# Patient Record
Sex: MALE | Race: WHITE | Employment: UNEMPLOYED | URBAN - METROPOLITAN AREA
[De-identification: names, ages, dates, MRNs, and addresses within clinical notes are randomized per-mention and may not be internally consistent; named-entity substitution may affect disease eponyms.]

---

## 2017-01-19 ENCOUNTER — HOSPITAL ENCOUNTER (OUTPATIENT)
Dept: LAB | Age: 62
Discharge: HOME | End: 2017-01-19
Attending: PSYCHIATRY & NEUROLOGY | Admitting: PSYCHIATRY & NEUROLOGY

## 2017-01-19 LAB
ALBUMIN SERPL-MCNC: 4.2 GM/DL (ref 3.4–5)
ALP BLD-CCNC: 97 IU/L (ref 40–128)
ALT SERPL-CCNC: 7 U/L (ref 10–40)
ANION GAP SERPL CALCULATED.3IONS-SCNC: 13 MMOL/L (ref 4–16)
AST SERPL-CCNC: 12 IU/L (ref 15–37)
BASOPHILS ABSOLUTE: 0 K/CU MM
BASOPHILS RELATIVE PERCENT: 0.6 % (ref 0–1)
BILIRUB SERPL-MCNC: 0.5 MG/DL (ref 0–1)
BUN BLDV-MCNC: 8 MG/DL (ref 6–23)
CALCIUM SERPL-MCNC: 9.4 MG/DL (ref 8.3–10.6)
CHLORIDE BLD-SCNC: 99 MMOL/L (ref 99–110)
CO2: 28 MMOL/L (ref 21–32)
CREAT SERPL-MCNC: 0.9 MG/DL (ref 0.9–1.3)
DIFFERENTIAL TYPE: ABNORMAL
EOSINOPHILS ABSOLUTE: 0.3 K/CU MM
EOSINOPHILS RELATIVE PERCENT: 3.6 % (ref 0–3)
ERYTHROCYTE SEDIMENTATION RATE: 11 MM/HR (ref 0–20)
GFR AFRICAN AMERICAN: >60 ML/MIN/1.73M2
GFR NON-AFRICAN AMERICAN: >60 ML/MIN/1.73M2
GLUCOSE FASTING: 100 MG/DL (ref 70–99)
HCT VFR BLD CALC: 46.1 % (ref 42–52)
HEMOGLOBIN: 15.1 GM/DL (ref 13.5–18)
IMMATURE NEUTROPHIL %: 0.4 % (ref 0–0.43)
LYMPHOCYTES ABSOLUTE: 1.4 K/CU MM
LYMPHOCYTES RELATIVE PERCENT: 21 % (ref 24–44)
MCH RBC QN AUTO: 31.5 PG (ref 27–31)
MCHC RBC AUTO-ENTMCNC: 32.8 % (ref 32–36)
MCV RBC AUTO: 96 FL (ref 78–100)
MONOCYTES ABSOLUTE: 0.4 K/CU MM
MONOCYTES RELATIVE PERCENT: 6.4 % (ref 0–4)
NUCLEATED RBC %: 0 %
PDW BLD-RTO: 12.6 % (ref 11.7–14.9)
PLATELET # BLD: 161 K/CU MM (ref 140–440)
PMV BLD AUTO: 10 FL (ref 7.5–11.1)
POTASSIUM SERPL-SCNC: 3.9 MMOL/L (ref 3.5–5.1)
RBC # BLD: 4.8 M/CU MM (ref 4.6–6.2)
SEGMENTED NEUTROPHILS ABSOLUTE COUNT: 4.7 K/CU MM
SEGMENTED NEUTROPHILS RELATIVE PERCENT: 68 % (ref 36–66)
SODIUM BLD-SCNC: 140 MMOL/L (ref 135–145)
TOTAL IMMATURE NEUTOROPHIL: 0.03 K/CU MM
TOTAL NUCLEATED RBC: 0 K/CU MM
TOTAL PROTEIN: 6.9 GM/DL (ref 6.4–8.2)
VITAMIN B-12: 924.5 PG/ML (ref 211–911)
WBC # BLD: 6.9 K/CU MM (ref 4–10.5)

## 2017-01-20 LAB
ALBUMIN ELP: 2.8 GM/DL (ref 3.2–5.6)
ALPHA-1-GLOBULIN: 0.2 GM/DL (ref 0.1–0.4)
ALPHA-2-GLOBULIN: 0.8 GM/DL (ref 0.4–1.2)
ANTI-NUCLEAR ANTIBODY (ANA): NORMAL
BETA GLOBULIN: 1.3 GM/DL (ref 0.5–1.3)
GAMMA GLOBULIN: 1.9 GM/DL (ref 0.5–1.6)
PATHOLOGIST: ABNORMAL
TOTAL PROTEIN: 6.9 GM/DL (ref 6.4–8.2)

## 2017-03-22 ENCOUNTER — HOSPITAL ENCOUNTER (OUTPATIENT)
Dept: LAB | Age: 62
Discharge: HOME | End: 2017-03-22
Attending: INTERNAL MEDICINE | Admitting: INTERNAL MEDICINE

## 2017-03-22 ENCOUNTER — HOSPITAL ENCOUNTER (OUTPATIENT)
Dept: LAB | Age: 62
Discharge: HOME | End: 2017-03-22
Attending: PSYCHIATRY & NEUROLOGY | Admitting: PSYCHIATRY & NEUROLOGY

## 2017-03-22 LAB
ALBUMIN SERPL-MCNC: 4.1 GM/DL (ref 3.4–5)
ALP BLD-CCNC: 109 IU/L (ref 40–129)
ALT SERPL-CCNC: 9 U/L (ref 10–40)
ANION GAP SERPL CALCULATED.3IONS-SCNC: 15 MMOL/L (ref 4–16)
AST SERPL-CCNC: 12 IU/L (ref 15–37)
BASOPHILS ABSOLUTE: 0.1 K/CU MM
BASOPHILS RELATIVE PERCENT: 0.6 % (ref 0–1)
BILIRUB SERPL-MCNC: 0.7 MG/DL (ref 0–1)
BUN BLDV-MCNC: 9 MG/DL (ref 6–23)
CALCIUM SERPL-MCNC: 9.1 MG/DL (ref 8.3–10.6)
CHLORIDE BLD-SCNC: 93 MMOL/L (ref 99–110)
CHOLESTEROL: 135 MG/DL
CO2: 29 MMOL/L (ref 21–32)
CREAT SERPL-MCNC: 1 MG/DL (ref 0.9–1.3)
DIFFERENTIAL TYPE: ABNORMAL
EOSINOPHILS ABSOLUTE: 0.3 K/CU MM
EOSINOPHILS RELATIVE PERCENT: 3.3 % (ref 0–3)
GFR AFRICAN AMERICAN: >60 ML/MIN/1.73M2
GFR NON-AFRICAN AMERICAN: >60 ML/MIN/1.73M2
GLUCOSE BLD-MCNC: 106 MG/DL (ref 70–140)
HCT VFR BLD CALC: 44 % (ref 42–52)
HDLC SERPL-MCNC: 62 MG/DL
HEMOGLOBIN: 14.6 GM/DL (ref 13.5–18)
IMMATURE NEUTROPHIL %: 0.4 % (ref 0–0.43)
LACTATE DEHYDROGENASE: 141 IU/L (ref 120–246)
LDL CHOLESTEROL CALCULATED: 43 MG/DL
LITHIUM LEVEL: 1.2 MMOL/L (ref 0.6–1.2)
LYMPHOCYTES ABSOLUTE: 1.2 K/CU MM
LYMPHOCYTES RELATIVE PERCENT: 14.1 % (ref 24–44)
MCH RBC QN AUTO: 31.3 PG (ref 27–31)
MCHC RBC AUTO-ENTMCNC: 33.2 % (ref 32–36)
MCV RBC AUTO: 94.2 FL (ref 78–100)
MONOCYTES ABSOLUTE: 0.7 K/CU MM
MONOCYTES RELATIVE PERCENT: 8.6 % (ref 0–4)
NUCLEATED RBC %: 0 %
PDW BLD-RTO: 12.4 % (ref 11.7–14.9)
PLATELET # BLD: 158 K/CU MM (ref 140–440)
PMV BLD AUTO: 9.3 FL (ref 7.5–11.1)
POTASSIUM SERPL-SCNC: 3.7 MMOL/L (ref 3.5–5.1)
RBC # BLD: 4.67 M/CU MM (ref 4.6–6.2)
SEGMENTED NEUTROPHILS ABSOLUTE COUNT: 6.1 K/CU MM
SEGMENTED NEUTROPHILS RELATIVE PERCENT: 73 % (ref 36–66)
SODIUM BLD-SCNC: 137 MMOL/L (ref 135–145)
TOTAL IMMATURE NEUTOROPHIL: 0.03 K/CU MM
TOTAL NUCLEATED RBC: 0 K/CU MM
TOTAL PROTEIN: 6.7 GM/DL (ref 6.4–8.2)
TRIGL SERPL-MCNC: 149 MG/DL
WBC # BLD: 8.3 K/CU MM (ref 4–10.5)

## 2017-06-20 ENCOUNTER — HOSPITAL ENCOUNTER (OUTPATIENT)
Dept: LAB | Age: 62
Discharge: HOME | End: 2017-06-20
Attending: PSYCHIATRY & NEUROLOGY | Admitting: PSYCHIATRY & NEUROLOGY

## 2017-06-20 LAB
ALBUMIN SERPL-MCNC: 4.1 GM/DL (ref 3.4–5)
ALP BLD-CCNC: 93 IU/L (ref 40–129)
ALT SERPL-CCNC: 7 U/L (ref 10–40)
AMMONIA: 45 UMOL/L (ref 16–60)
AST SERPL-CCNC: 10 IU/L (ref 15–37)
BILIRUB SERPL-MCNC: 0.3 MG/DL (ref 0–1)
BILIRUBIN DIRECT: 0.2 MG/DL (ref 0–0.3)
BILIRUBIN, INDIRECT: 0.1 MG/DL (ref 0–0.7)
ESTIMATED AVERAGE GLUCOSE: 94 MG/DL
HBA1C MFR BLD: 4.9 % (ref 4.2–6.3)
TOTAL PROTEIN: 6.8 GM/DL (ref 6.4–8.2)
TSH HIGH SENSITIVITY: 2.08 UIU/ML (ref 0.27–4.2)
VALPROIC ACID LEVEL: 44.6 UG/ML (ref 50–100)
VITAMIN B-12: 984.9 PG/ML (ref 211–911)

## 2017-06-21 LAB
ALBUMIN ELP: 3.5 GM/DL (ref 3.2–5.6)
ALPHA-1-GLOBULIN: 0.3 GM/DL (ref 0.1–0.4)
ALPHA-2-GLOBULIN: 0.8 GM/DL (ref 0.4–1.2)
BETA GLOBULIN: 1.1 GM/DL (ref 0.5–1.3)
GAMMA GLOBULIN: 1.2 GM/DL (ref 0.5–1.6)
TOTAL PROTEIN: 6.9 GM/DL (ref 6.4–8.2)

## 2017-06-22 LAB — ANTI-NUCLEAR ANTIBODY (ANA): NORMAL

## 2017-07-24 ENCOUNTER — HOSPITAL ENCOUNTER (OUTPATIENT)
Dept: LAB | Age: 62
Discharge: HOME | End: 2017-07-24
Attending: INTERNAL MEDICINE | Admitting: INTERNAL MEDICINE

## 2017-07-24 LAB
ALBUMIN SERPL-MCNC: 4.1 GM/DL (ref 3.4–5)
ALP BLD-CCNC: 80 IU/L (ref 40–128)
ALT SERPL-CCNC: 7 U/L (ref 10–40)
ANION GAP SERPL CALCULATED.3IONS-SCNC: 15 MMOL/L (ref 4–16)
AST SERPL-CCNC: 11 IU/L (ref 15–37)
BASOPHILS ABSOLUTE: 0.1 K/CU MM
BASOPHILS RELATIVE PERCENT: 0.6 % (ref 0–1)
BILIRUB SERPL-MCNC: 0.4 MG/DL (ref 0–1)
BUN BLDV-MCNC: 11 MG/DL (ref 6–23)
CALCIUM SERPL-MCNC: 8.9 MG/DL (ref 8.3–10.6)
CHLORIDE BLD-SCNC: 96 MMOL/L (ref 99–110)
CO2: 27 MMOL/L (ref 21–32)
CREAT SERPL-MCNC: 0.9 MG/DL (ref 0.9–1.3)
DIFFERENTIAL TYPE: ABNORMAL
EOSINOPHILS ABSOLUTE: 0.3 K/CU MM
EOSINOPHILS RELATIVE PERCENT: 3.1 % (ref 0–3)
GFR AFRICAN AMERICAN: >60 ML/MIN/1.73M2
GFR NON-AFRICAN AMERICAN: >60 ML/MIN/1.73M2
GLUCOSE FASTING: 83 MG/DL (ref 70–99)
HCT VFR BLD CALC: 40.4 % (ref 42–52)
HEMOGLOBIN: 13.8 GM/DL (ref 13.5–18)
IMMATURE NEUTROPHIL %: 1.2 % (ref 0–0.43)
LYMPHOCYTES ABSOLUTE: 1.6 K/CU MM
LYMPHOCYTES RELATIVE PERCENT: 17.8 % (ref 24–44)
MCH RBC QN AUTO: 31.5 PG (ref 27–31)
MCHC RBC AUTO-ENTMCNC: 34.2 % (ref 32–36)
MCV RBC AUTO: 92.2 FL (ref 78–100)
MONOCYTES ABSOLUTE: 0.9 K/CU MM
MONOCYTES RELATIVE PERCENT: 9.5 % (ref 0–4)
NUCLEATED RBC %: 0 %
PDW BLD-RTO: 12.5 % (ref 11.7–14.9)
PLATELET # BLD: 188 K/CU MM (ref 140–440)
PMV BLD AUTO: 8.8 FL (ref 7.5–11.1)
POTASSIUM SERPL-SCNC: 3.7 MMOL/L (ref 3.5–5.1)
RBC # BLD: 4.38 M/CU MM (ref 4.6–6.2)
SEGMENTED NEUTROPHILS ABSOLUTE COUNT: 6.1 K/CU MM
SEGMENTED NEUTROPHILS RELATIVE PERCENT: 67.8 % (ref 36–66)
SODIUM BLD-SCNC: 138 MMOL/L (ref 135–145)
TOTAL IMMATURE NEUTOROPHIL: 0.11 K/CU MM
TOTAL NUCLEATED RBC: 0 K/CU MM
TOTAL PROTEIN: 6.7 GM/DL (ref 6.4–8.2)
WBC # BLD: 9 K/CU MM (ref 4–10.5)

## 2017-11-28 ENCOUNTER — HOSPITAL ENCOUNTER (OUTPATIENT)
Dept: LAB | Age: 62
Discharge: HOME | End: 2017-11-28
Attending: PSYCHIATRY & NEUROLOGY | Admitting: PSYCHIATRY & NEUROLOGY

## 2017-11-28 LAB
AST SERPL-CCNC: 12 IU/L (ref 15–37)
BASOPHILS ABSOLUTE: 0 K/CU MM
BASOPHILS RELATIVE PERCENT: 0.5 % (ref 0–1)
BUN BLDV-MCNC: 12 MG/DL (ref 6–23)
CREAT SERPL-MCNC: 0.8 MG/DL (ref 0.9–1.3)
DIFFERENTIAL TYPE: ABNORMAL
EOSINOPHILS ABSOLUTE: 0.2 K/CU MM
EOSINOPHILS RELATIVE PERCENT: 3 % (ref 0–3)
GFR AFRICAN AMERICAN: >60 ML/MIN/1.73M2
GFR NON-AFRICAN AMERICAN: >60 ML/MIN/1.73M2
HCT VFR BLD CALC: 40 % (ref 42–52)
HEMOGLOBIN: 13.1 GM/DL (ref 13.5–18)
IMMATURE NEUTROPHIL %: 0.3 % (ref 0–0.43)
LITHIUM LEVEL: 0.7 MMOL/L (ref 0.6–1.2)
LYMPHOCYTES ABSOLUTE: 1.2 K/CU MM
LYMPHOCYTES RELATIVE PERCENT: 19.2 % (ref 24–44)
MCH RBC QN AUTO: 31.8 PG (ref 27–31)
MCHC RBC AUTO-ENTMCNC: 32.8 % (ref 32–36)
MCV RBC AUTO: 97.1 FL (ref 78–100)
MONOCYTES ABSOLUTE: 0.5 K/CU MM
MONOCYTES RELATIVE PERCENT: 7.4 % (ref 0–4)
NUCLEATED RBC %: 0 %
PDW BLD-RTO: 13.2 % (ref 11.7–14.9)
PLATELET # BLD: 173 K/CU MM (ref 140–440)
PMV BLD AUTO: 9.9 FL (ref 7.5–11.1)
RBC # BLD: 4.12 M/CU MM (ref 4.6–6.2)
SEGMENTED NEUTROPHILS ABSOLUTE COUNT: 4.3 K/CU MM
SEGMENTED NEUTROPHILS RELATIVE PERCENT: 69.6 % (ref 36–66)
TOTAL IMMATURE NEUTOROPHIL: 0.02 K/CU MM
TOTAL NUCLEATED RBC: 0 K/CU MM
TSH HIGH SENSITIVITY: 2.97 UIU/ML (ref 0.27–4.2)
VALPROIC ACID LEVEL: 40.9 UG/ML (ref 50–100)
WBC # BLD: 6.2 K/CU MM (ref 4–10.5)

## 2018-02-14 ENCOUNTER — HOSPITAL ENCOUNTER (OUTPATIENT)
Dept: SPEECH THERAPY | Age: 63
Discharge: HOME | End: 2018-02-14
Attending: OTOLARYNGOLOGY | Admitting: OTOLARYNGOLOGY

## 2018-02-14 ENCOUNTER — HOSPITAL ENCOUNTER (OUTPATIENT)
Dept: LAB | Age: 63
Discharge: HOME | End: 2018-02-14
Attending: NURSE PRACTITIONER | Admitting: NURSE PRACTITIONER

## 2018-02-14 DIAGNOSIS — R13.14 DYSPHAGIA, PHARYNGOESOPHAGEAL: ICD-10-CM

## 2018-02-14 LAB
ALT SERPL-CCNC: 11 U/L (ref 10–40)
ANION GAP SERPL CALCULATED.3IONS-SCNC: 12 MMOL/L (ref 4–16)
BASOPHILS ABSOLUTE: 0.1 K/CU MM
BASOPHILS RELATIVE PERCENT: 0.7 % (ref 0–1)
BUN BLDV-MCNC: 13 MG/DL (ref 6–23)
CALCIUM SERPL-MCNC: 9.2 MG/DL (ref 8.3–10.6)
CHLORIDE BLD-SCNC: 98 MMOL/L (ref 99–110)
CHOLESTEROL: 136 MG/DL
CO2: 28 MMOL/L (ref 21–32)
CREAT SERPL-MCNC: 0.9 MG/DL (ref 0.9–1.3)
DIFFERENTIAL TYPE: ABNORMAL
EOSINOPHILS ABSOLUTE: 0.3 K/CU MM
EOSINOPHILS RELATIVE PERCENT: 2.6 % (ref 0–3)
ESTIMATED AVERAGE GLUCOSE: 94 MG/DL
GFR AFRICAN AMERICAN: >60 ML/MIN/1.73M2
GFR NON-AFRICAN AMERICAN: >60 ML/MIN/1.73M2
GLUCOSE BLD-MCNC: 102 MG/DL (ref 70–99)
HBA1C MFR BLD: 4.9 % (ref 4.2–6.3)
HCT VFR BLD CALC: 42 % (ref 42–52)
HDLC SERPL-MCNC: 60 MG/DL
HEMOGLOBIN: 13.7 GM/DL (ref 13.5–18)
IMMATURE NEUTROPHIL %: 0.3 % (ref 0–0.43)
LDL CHOLESTEROL DIRECT: 52 MG/DL
LYMPHOCYTES ABSOLUTE: 1.5 K/CU MM
LYMPHOCYTES RELATIVE PERCENT: 15.8 % (ref 24–44)
MCH RBC QN AUTO: 30.4 PG (ref 27–31)
MCHC RBC AUTO-ENTMCNC: 32.6 % (ref 32–36)
MCV RBC AUTO: 93.3 FL (ref 78–100)
MONOCYTES ABSOLUTE: 0.6 K/CU MM
MONOCYTES RELATIVE PERCENT: 6.6 % (ref 0–4)
NUCLEATED RBC %: 0 %
PDW BLD-RTO: 12.6 % (ref 11.7–14.9)
PLATELET # BLD: 170 K/CU MM (ref 140–440)
PMV BLD AUTO: 9.5 FL (ref 7.5–11.1)
POTASSIUM SERPL-SCNC: 4 MMOL/L (ref 3.5–5.1)
PROSTATE SPECIFIC ANTIGEN: 0.83 NG/ML (ref 0–4)
RBC # BLD: 4.5 M/CU MM (ref 4.6–6.2)
SEGMENTED NEUTROPHILS ABSOLUTE COUNT: 7.1 K/CU MM
SEGMENTED NEUTROPHILS RELATIVE PERCENT: 74 % (ref 36–66)
SODIUM BLD-SCNC: 138 MMOL/L (ref 135–145)
TOTAL IMMATURE NEUTOROPHIL: 0.03 K/CU MM
TOTAL NUCLEATED RBC: 0 K/CU MM
TRIGL SERPL-MCNC: 217 MG/DL
TSH HIGH SENSITIVITY: 3.34 UIU/ML (ref 0.27–4.2)
VITAMIN D 25-HYDROXY: 24.24 NG/ML
WBC # BLD: 9.7 K/CU MM (ref 4–10.5)

## 2018-04-11 ASSESSMENT — ENCOUNTER SYMPTOMS: SHORTNESS OF BREATH: 1

## 2018-04-12 ENCOUNTER — HOSPITAL ENCOUNTER (OUTPATIENT)
Dept: SURGERY | Age: 63
Discharge: HOME | End: 2018-04-12
Attending: SPECIALIST | Admitting: SPECIALIST

## 2018-04-12 VITALS
OXYGEN SATURATION: 98 % | BODY MASS INDEX: 33.6 KG/M2 | HEART RATE: 55 BPM | DIASTOLIC BLOOD PRESSURE: 84 MMHG | WEIGHT: 240 LBS | HEIGHT: 71 IN | TEMPERATURE: 97.8 F | RESPIRATION RATE: 16 BRPM | SYSTOLIC BLOOD PRESSURE: 139 MMHG

## 2018-04-12 RX ORDER — SODIUM CHLORIDE, SODIUM LACTATE, POTASSIUM CHLORIDE, CALCIUM CHLORIDE 600; 310; 30; 20 MG/100ML; MG/100ML; MG/100ML; MG/100ML
INJECTION, SOLUTION INTRAVENOUS CONTINUOUS
Status: DISCONTINUED | OUTPATIENT
Start: 2018-04-12 | End: 2018-04-13 | Stop reason: HOSPADM

## 2018-04-12 RX ADMIN — SODIUM CHLORIDE, SODIUM LACTATE, POTASSIUM CHLORIDE, CALCIUM CHLORIDE: 600; 310; 30; 20 INJECTION, SOLUTION INTRAVENOUS at 08:47

## 2018-04-12 ASSESSMENT — PAIN DESCRIPTION - DESCRIPTORS: DESCRIPTORS: ACHING

## 2018-04-12 ASSESSMENT — PAIN SCALES - GENERAL
PAINLEVEL_OUTOF10: 0
PAINLEVEL_OUTOF10: 0

## 2018-04-12 ASSESSMENT — PAIN - FUNCTIONAL ASSESSMENT: PAIN_FUNCTIONAL_ASSESSMENT: 0-10

## 2018-05-11 ENCOUNTER — HOSPITAL ENCOUNTER (OUTPATIENT)
Dept: LAB | Age: 63
Discharge: HOME | End: 2018-05-11
Attending: PSYCHIATRY & NEUROLOGY | Admitting: PSYCHIATRY & NEUROLOGY

## 2018-05-11 LAB
ALBUMIN SERPL-MCNC: 4.2 GM/DL (ref 3.4–5)
ALP BLD-CCNC: 97 IU/L (ref 40–129)
ALT SERPL-CCNC: 10 U/L (ref 10–40)
ANION GAP SERPL CALCULATED.3IONS-SCNC: 11 MMOL/L (ref 4–16)
AST SERPL-CCNC: 12 IU/L (ref 15–37)
BASOPHILS ABSOLUTE: 0.1 K/CU MM
BASOPHILS RELATIVE PERCENT: 0.6 % (ref 0–1)
BILIRUB SERPL-MCNC: 0.4 MG/DL (ref 0–1)
BUN BLDV-MCNC: 13 MG/DL (ref 6–23)
CALCIUM SERPL-MCNC: 9.6 MG/DL (ref 8.3–10.6)
CHLORIDE BLD-SCNC: 98 MMOL/L (ref 99–110)
CO2: 29 MMOL/L (ref 21–32)
CREAT SERPL-MCNC: 1 MG/DL (ref 0.9–1.3)
DIFFERENTIAL TYPE: ABNORMAL
EOSINOPHILS ABSOLUTE: 0.2 K/CU MM
EOSINOPHILS RELATIVE PERCENT: 1.9 % (ref 0–3)
GFR AFRICAN AMERICAN: >60 ML/MIN/1.73M2
GFR NON-AFRICAN AMERICAN: >60 ML/MIN/1.73M2
GLUCOSE BLD-MCNC: 113 MG/DL (ref 70–99)
HCT VFR BLD CALC: 45.3 % (ref 42–52)
HEMOGLOBIN: 15.3 GM/DL (ref 13.5–18)
IMMATURE NEUTROPHIL %: 0.4 % (ref 0–0.43)
LITHIUM LEVEL: 0.7 MMOL/L (ref 0.6–1.2)
LYMPHOCYTES ABSOLUTE: 1 K/CU MM
LYMPHOCYTES RELATIVE PERCENT: 12 % (ref 24–44)
MCH RBC QN AUTO: 31 PG (ref 27–31)
MCHC RBC AUTO-ENTMCNC: 33.8 % (ref 32–36)
MCV RBC AUTO: 91.9 FL (ref 78–100)
MONOCYTES ABSOLUTE: 0.5 K/CU MM
MONOCYTES RELATIVE PERCENT: 6.3 % (ref 0–4)
NUCLEATED RBC %: 0 %
PDW BLD-RTO: 13 % (ref 11.7–14.9)
PLATELET # BLD: 178 K/CU MM (ref 140–440)
PMV BLD AUTO: 9.6 FL (ref 7.5–11.1)
POTASSIUM SERPL-SCNC: 4.3 MMOL/L (ref 3.5–5.1)
RBC # BLD: 4.93 M/CU MM (ref 4.6–6.2)
SEGMENTED NEUTROPHILS ABSOLUTE COUNT: 6.5 K/CU MM
SEGMENTED NEUTROPHILS RELATIVE PERCENT: 78.8 % (ref 36–66)
SODIUM BLD-SCNC: 138 MMOL/L (ref 135–145)
TOTAL IMMATURE NEUTOROPHIL: 0.03 K/CU MM
TOTAL NUCLEATED RBC: 0 K/CU MM
TOTAL PROTEIN: 7.1 GM/DL (ref 6.4–8.2)
TSH HIGH SENSITIVITY: 1.83 UIU/ML (ref 0.27–4.2)
VALPROIC ACID LEVEL: 42.3 UG/ML (ref 50–100)
WBC # BLD: 8.2 K/CU MM (ref 4–10.5)

## 2018-07-24 ENCOUNTER — HOSPITAL ENCOUNTER (OUTPATIENT)
Dept: LAB | Age: 63
Discharge: HOME | End: 2018-07-24
Attending: INTERNAL MEDICINE | Admitting: INTERNAL MEDICINE

## 2018-07-24 LAB
ALBUMIN SERPL-MCNC: 3.9 GM/DL (ref 3.4–5)
ALP BLD-CCNC: 84 IU/L (ref 40–128)
ALT SERPL-CCNC: 10 U/L (ref 10–40)
ANION GAP SERPL CALCULATED.3IONS-SCNC: 12 MMOL/L (ref 4–16)
AST SERPL-CCNC: 13 IU/L (ref 15–37)
BANDED NEUTROPHILS ABSOLUTE COUNT: 0.15 K/CU MM
BANDED NEUTROPHILS RELATIVE PERCENT: 3 % (ref 5–11)
BILIRUB SERPL-MCNC: 0.3 MG/DL (ref 0–1)
BUN BLDV-MCNC: 18 MG/DL (ref 6–23)
CALCIUM SERPL-MCNC: 9.5 MG/DL (ref 8.3–10.6)
CHLORIDE BLD-SCNC: 101 MMOL/L (ref 99–110)
CO2: 27 MMOL/L (ref 21–32)
CREAT SERPL-MCNC: 0.9 MG/DL (ref 0.9–1.3)
DIFFERENTIAL TYPE: ABNORMAL
EOSINOPHILS ABSOLUTE: 0.2 K/CU MM
EOSINOPHILS RELATIVE PERCENT: 3 % (ref 0–3)
GFR AFRICAN AMERICAN: >60 ML/MIN/1.73M2
GFR NON-AFRICAN AMERICAN: >60 ML/MIN/1.73M2
GLUCOSE BLD-MCNC: 100 MG/DL (ref 70–99)
HCT VFR BLD CALC: 44.4 % (ref 42–52)
HEMOGLOBIN: 14.4 GM/DL (ref 13.5–18)
LYMPHOCYTES ABSOLUTE: 1.2 K/CU MM
LYMPHOCYTES RELATIVE PERCENT: 23 % (ref 24–44)
MCH RBC QN AUTO: 31.2 PG (ref 27–31)
MCHC RBC AUTO-ENTMCNC: 32.4 % (ref 32–36)
MCV RBC AUTO: 96.3 FL (ref 78–100)
MONOCYTES ABSOLUTE: 0.4 K/CU MM
MONOCYTES RELATIVE PERCENT: 7 % (ref 0–4)
PDW BLD-RTO: 13.6 % (ref 11.7–14.9)
PLATELET # BLD: 182 K/CU MM (ref 140–440)
PMV BLD AUTO: 9.7 FL (ref 7.5–11.1)
POLYCHROMASIA: ABNORMAL
POTASSIUM SERPL-SCNC: 4.5 MMOL/L (ref 3.5–5.1)
RBC # BLD: 4.61 M/CU MM (ref 4.6–6.2)
SEGMENTED NEUTROPHILS ABSOLUTE COUNT: 3 K/CU MM
SEGMENTED NEUTROPHILS RELATIVE PERCENT: 64 % (ref 36–66)
SODIUM BLD-SCNC: 140 MMOL/L (ref 135–145)
TOTAL PROTEIN: 7 GM/DL (ref 6.4–8.2)
WBC # BLD: 5 K/CU MM (ref 4–10.5)

## 2018-07-31 ENCOUNTER — HOSPITAL ENCOUNTER (OUTPATIENT)
Dept: ULTRASOUND IMAGING | Age: 63
Discharge: HOME | End: 2018-07-31
Attending: INTERNAL MEDICINE | Admitting: INTERNAL MEDICINE

## 2018-07-31 DIAGNOSIS — I82.403 DEEP VEIN THROMBOSIS (DVT) OF BOTH LOWER EXTREMITIES, UNSPECIFIED CHRONICITY, UNSPECIFIED VEIN (HCC): ICD-10-CM

## 2019-02-21 ENCOUNTER — HOSPITAL ENCOUNTER (OUTPATIENT)
Age: 64
Discharge: HOME | End: 2019-02-21
Payer: COMMERCIAL

## 2019-02-21 ENCOUNTER — HOSPITAL ENCOUNTER (OUTPATIENT)
Age: 64
End: 2019-02-21
Payer: COMMERCIAL

## 2019-02-21 LAB
ALT SERPL-CCNC: 11 U/L (ref 10–40)
ANION GAP SERPL CALCULATED.3IONS-SCNC: 9 MMOL/L (ref 4–16)
AST SERPL-CCNC: 13 IU/L (ref 15–37)
BASOPHILS ABSOLUTE: 0 K/CU MM
BASOPHILS RELATIVE PERCENT: 0.5 % (ref 0–1)
BUN BLDV-MCNC: 12 MG/DL (ref 6–23)
CALCIUM SERPL-MCNC: 9.1 MG/DL (ref 8.3–10.6)
CHLORIDE BLD-SCNC: 99 MMOL/L (ref 99–110)
CHOLESTEROL: 118 MG/DL
CO2: 30 MMOL/L (ref 21–32)
CREAT SERPL-MCNC: 0.9 MG/DL (ref 0.9–1.3)
DIFFERENTIAL TYPE: ABNORMAL
EOSINOPHILS ABSOLUTE: 0.3 K/CU MM
EOSINOPHILS RELATIVE PERCENT: 3 % (ref 0–3)
GFR AFRICAN AMERICAN: >60 ML/MIN/1.73M2
GFR NON-AFRICAN AMERICAN: >60 ML/MIN/1.73M2
GLUCOSE BLD-MCNC: 99 MG/DL (ref 70–99)
HCT VFR BLD CALC: 45.9 % (ref 42–52)
HDLC SERPL-MCNC: 57 MG/DL
HEMOGLOBIN: 15.1 GM/DL (ref 13.5–18)
IMMATURE NEUTROPHIL %: 0.4 % (ref 0–0.43)
LDL CHOLESTEROL CALCULATED: 44 MG/DL
LITHIUM LEVEL: 0.6 MMOL/L (ref 0.6–1.2)
LYMPHOCYTES ABSOLUTE: 1.3 K/CU MM
LYMPHOCYTES RELATIVE PERCENT: 16 % (ref 24–44)
MCH RBC QN AUTO: 31.5 PG (ref 27–31)
MCHC RBC AUTO-ENTMCNC: 32.9 % (ref 32–36)
MCV RBC AUTO: 95.8 FL (ref 78–100)
MONOCYTES ABSOLUTE: 0.5 K/CU MM
MONOCYTES RELATIVE PERCENT: 6.4 % (ref 0–4)
NUCLEATED RBC %: 0 %
PDW BLD-RTO: 13 % (ref 11.7–14.9)
PLATELET # BLD: 172 K/CU MM (ref 140–440)
PMV BLD AUTO: 9.6 FL (ref 7.5–11.1)
POTASSIUM SERPL-SCNC: 3.9 MMOL/L (ref 3.5–5.1)
RBC # BLD: 4.79 M/CU MM (ref 4.6–6.2)
SEGMENTED NEUTROPHILS ABSOLUTE COUNT: 6.2 K/CU MM
SEGMENTED NEUTROPHILS RELATIVE PERCENT: 73.7 % (ref 36–66)
SODIUM BLD-SCNC: 138 MMOL/L (ref 135–145)
TOTAL IMMATURE NEUTOROPHIL: 0.03 K/CU MM
TOTAL NUCLEATED RBC: 0 K/CU MM
TRIGL SERPL-MCNC: 86 MG/DL
TSH HIGH SENSITIVITY: 1.92 UIU/ML (ref 0.27–4.2)
VALPROIC ACID LEVEL: 37.2 UG/ML (ref 50–100)
WBC # BLD: 8.4 K/CU MM (ref 4–10.5)

## 2019-02-21 PROCEDURE — 84443 ASSAY THYROID STIM HORMONE: CPT

## 2019-02-21 PROCEDURE — 80164 ASSAY DIPROPYLACETIC ACD TOT: CPT

## 2019-02-21 PROCEDURE — 80048 BASIC METABOLIC PNL TOTAL CA: CPT

## 2019-02-21 PROCEDURE — G0103 PSA SCREENING: HCPCS

## 2019-02-21 PROCEDURE — 36415 COLL VENOUS BLD VENIPUNCTURE: CPT

## 2019-02-21 PROCEDURE — 80178 ASSAY OF LITHIUM: CPT

## 2019-02-21 PROCEDURE — 84450 TRANSFERASE (AST) (SGOT): CPT

## 2019-02-21 PROCEDURE — 84460 ALANINE AMINO (ALT) (SGPT): CPT

## 2019-02-21 PROCEDURE — 85025 COMPLETE CBC W/AUTO DIFF WBC: CPT

## 2019-02-21 PROCEDURE — 80061 LIPID PANEL: CPT

## 2019-02-22 LAB — PROSTATE SPECIFIC ANTIGEN: 1.09 NG/ML (ref 0–4)

## 2019-08-07 ENCOUNTER — HOSPITAL ENCOUNTER (OUTPATIENT)
Age: 64
Discharge: HOME | End: 2019-08-07
Payer: COMMERCIAL

## 2019-08-07 LAB
DOSE AMOUNT: NORMAL
DOSE TIME: NORMAL
LITHIUM LEVEL: 0.7 MMOL/L (ref 0.6–1.2)
VALPROIC ACID LEVEL: 64.8 UG/ML (ref 50–100)

## 2019-08-07 PROCEDURE — 80178 ASSAY OF LITHIUM: CPT

## 2019-08-07 PROCEDURE — 80164 ASSAY DIPROPYLACETIC ACD TOT: CPT

## 2019-08-07 PROCEDURE — 36415 COLL VENOUS BLD VENIPUNCTURE: CPT

## 2020-02-04 ENCOUNTER — HOSPITAL ENCOUNTER (OUTPATIENT)
Age: 65
Discharge: HOME | End: 2020-02-04
Payer: COMMERCIAL

## 2020-02-04 LAB
ALBUMIN SERPL-MCNC: 4.4 GM/DL (ref 3.4–5)
ALP BLD-CCNC: 88 IU/L (ref 40–129)
ALT SERPL-CCNC: 10 U/L (ref 10–40)
AMPHETAMINES: NEGATIVE
ANION GAP SERPL CALCULATED.3IONS-SCNC: 14 MMOL/L (ref 4–16)
AST SERPL-CCNC: 13 IU/L (ref 15–37)
BACTERIA: NEGATIVE /HPF
BARBITURATE SCREEN URINE: ABNORMAL
BASOPHILS ABSOLUTE: 0.1 K/CU MM
BASOPHILS RELATIVE PERCENT: 0.7 % (ref 0–1)
BENZODIAZEPINE SCREEN, URINE: NEGATIVE
BILIRUB SERPL-MCNC: 0.5 MG/DL (ref 0–1)
BILIRUBIN URINE: NEGATIVE MG/DL
BLOOD, URINE: ABNORMAL
BUN BLDV-MCNC: 15 MG/DL (ref 6–23)
CALCIUM SERPL-MCNC: 9.5 MG/DL (ref 8.3–10.6)
CANNABINOID SCREEN URINE: NEGATIVE
CHLORIDE BLD-SCNC: 96 MMOL/L (ref 99–110)
CHOLESTEROL, FASTING: 128 MG/DL
CLARITY: CLEAR
CO2: 30 MMOL/L (ref 21–32)
COCAINE METABOLITE: NEGATIVE
COLOR: YELLOW
CREAT SERPL-MCNC: 1.1 MG/DL (ref 0.9–1.3)
DIFFERENTIAL TYPE: ABNORMAL
DOSE AMOUNT: ABNORMAL
DOSE TIME: ABNORMAL
EOSINOPHILS ABSOLUTE: 0.2 K/CU MM
EOSINOPHILS RELATIVE PERCENT: 2.4 % (ref 0–3)
GFR AFRICAN AMERICAN: >60 ML/MIN/1.73M2
GFR NON-AFRICAN AMERICAN: >60 ML/MIN/1.73M2
GLUCOSE BLD-MCNC: 94 MG/DL (ref 70–99)
GLUCOSE, URINE: NEGATIVE MG/DL
HCT VFR BLD CALC: 45.9 % (ref 42–52)
HDLC SERPL-MCNC: 54 MG/DL
HEMOGLOBIN: 14.6 GM/DL (ref 13.5–18)
IMMATURE NEUTROPHIL %: 0.3 % (ref 0–0.43)
KETONES, URINE: NEGATIVE MG/DL
LDL CHOLESTEROL DIRECT: 57 MG/DL
LEUKOCYTE ESTERASE, URINE: NEGATIVE
LITHIUM LEVEL: 0.9 MMOL/L (ref 0.6–1.2)
LYMPHOCYTES ABSOLUTE: 1.4 K/CU MM
LYMPHOCYTES RELATIVE PERCENT: 14.2 % (ref 24–44)
MCH RBC QN AUTO: 31.8 PG (ref 27–31)
MCHC RBC AUTO-ENTMCNC: 31.8 % (ref 32–36)
MCV RBC AUTO: 100 FL (ref 78–100)
MONOCYTES ABSOLUTE: 0.7 K/CU MM
MONOCYTES RELATIVE PERCENT: 6.8 % (ref 0–4)
NITRITE URINE, QUANTITATIVE: NEGATIVE
NUCLEATED RBC %: 0 %
OPIATES, URINE: NEGATIVE
OXYCODONE: ABNORMAL
PDW BLD-RTO: 12.9 % (ref 11.7–14.9)
PH, URINE: 6 (ref 5–8)
PHENCYCLIDINE, URINE: NEGATIVE
PLATELET # BLD: 176 K/CU MM (ref 140–440)
PMV BLD AUTO: 10.4 FL (ref 7.5–11.1)
POTASSIUM SERPL-SCNC: 4.4 MMOL/L (ref 3.5–5.1)
PROTEIN UA: 30 MG/DL
RBC # BLD: 4.59 M/CU MM (ref 4.6–6.2)
RBC URINE: 9 /HPF (ref 0–3)
SEGMENTED NEUTROPHILS ABSOLUTE COUNT: 7.6 K/CU MM
SEGMENTED NEUTROPHILS RELATIVE PERCENT: 75.6 % (ref 36–66)
SODIUM BLD-SCNC: 140 MMOL/L (ref 135–145)
SPECIFIC GRAVITY UA: 1.01 (ref 1–1.03)
SQUAMOUS EPITHELIAL: <1 /HPF
TOTAL IMMATURE NEUTOROPHIL: 0.03 K/CU MM
TOTAL NUCLEATED RBC: 0 K/CU MM
TOTAL PROTEIN: 7.3 GM/DL (ref 6.4–8.2)
TRIGLYCERIDE, FASTING: 123 MG/DL
TSH HIGH SENSITIVITY: 2.59 UIU/ML (ref 0.27–4.2)
UROBILINOGEN, URINE: NORMAL MG/DL (ref 0.2–1)
VALPROIC ACID LEVEL: 47.7 UG/ML (ref 50–100)
WBC # BLD: 10 K/CU MM (ref 4–10.5)
WBC UA: 1 /HPF (ref 0–2)

## 2020-02-04 PROCEDURE — 84443 ASSAY THYROID STIM HORMONE: CPT

## 2020-02-04 PROCEDURE — 80307 DRUG TEST PRSMV CHEM ANLYZR: CPT

## 2020-02-04 PROCEDURE — 80178 ASSAY OF LITHIUM: CPT

## 2020-02-04 PROCEDURE — 80061 LIPID PANEL: CPT

## 2020-02-04 PROCEDURE — 84480 ASSAY TRIIODOTHYRONINE (T3): CPT

## 2020-02-04 PROCEDURE — 80053 COMPREHEN METABOLIC PANEL: CPT

## 2020-02-04 PROCEDURE — 80164 ASSAY DIPROPYLACETIC ACD TOT: CPT

## 2020-02-04 PROCEDURE — 85025 COMPLETE CBC W/AUTO DIFF WBC: CPT

## 2020-02-04 PROCEDURE — 81001 URINALYSIS AUTO W/SCOPE: CPT

## 2020-02-04 PROCEDURE — 36415 COLL VENOUS BLD VENIPUNCTURE: CPT

## 2020-02-04 PROCEDURE — 84436 ASSAY OF TOTAL THYROXINE: CPT

## 2020-02-06 LAB
T3 TOTAL: 106 NG/DL (ref 80–200)
T3 TOTAL: NORMAL NG/DL (ref 80–200)
T4 TOTAL: 5.46 UG/DL (ref 5.1–14.1)
T4 TOTAL: NORMAL UG/DL (ref 5.1–14.1)

## 2020-07-31 ENCOUNTER — HOSPITAL ENCOUNTER (EMERGENCY)
Age: 65
Discharge: HOME | End: 2020-07-31
Payer: COMMERCIAL

## 2020-07-31 ENCOUNTER — APPOINTMENT (OUTPATIENT)
Dept: GENERAL RADIOLOGY | Age: 65
End: 2020-07-31
Payer: COMMERCIAL

## 2020-07-31 ENCOUNTER — APPOINTMENT (OUTPATIENT)
Dept: ULTRASOUND IMAGING | Age: 65
End: 2020-07-31
Payer: COMMERCIAL

## 2020-07-31 VITALS
HEART RATE: 58 BPM | HEIGHT: 70 IN | TEMPERATURE: 99.2 F | DIASTOLIC BLOOD PRESSURE: 84 MMHG | WEIGHT: 245 LBS | OXYGEN SATURATION: 97 % | SYSTOLIC BLOOD PRESSURE: 146 MMHG | RESPIRATION RATE: 16 BRPM | BODY MASS INDEX: 35.07 KG/M2

## 2020-07-31 PROCEDURE — 73590 X-RAY EXAM OF LOWER LEG: CPT

## 2020-07-31 PROCEDURE — 93971 EXTREMITY STUDY: CPT

## 2020-07-31 PROCEDURE — 99283 EMERGENCY DEPT VISIT LOW MDM: CPT

## 2020-07-31 PROCEDURE — 94761 N-INVAS EAR/PLS OXIMETRY MLT: CPT

## 2020-07-31 NOTE — ED NOTES
Discharge instructions and follow up reviewed with patient. Voiced understanding.      Torrie Sheldon RN  07/31/20 9446

## 2020-07-31 NOTE — ED PROVIDER NOTES
left anterior shin just lateral of the tibial spine without overlying erythema or ecchymosis. It is nontender to palpation. Does not feel indurated. Neurological: Alert and oriented to person, place, and time. Motor and sensation grossly intact. Normal muscle tone. Skin: Warm and dry. No rash. Good capillary refill noted in left toes. Psychiatric: Normal mood and affect. Behavior is normal.    Procedures        Radiographs (if obtained):  [] The following radiograph was interpreted by myself in the absence of a radiologist:   [x] Radiologist's Report Reviewed:  VL DUP LOWER EXTREMITY VENOUS LEFT   Final Result   No evidence of DVT in the left lower extremity. XR TIBIA FIBULA LEFT (2 VIEWS)   Final Result   No acute osseous abnormality. Healed fracture deformities of the tibial and   fibular diaphyses. Labs  No results found for this visit on 07/31/20. MDM  Patient presents today with chief complaint of leg swelling. Physical exam revealed area of swelling over anteriolateral left shin. Workup included x-ray of left tib/fib which revealed no acute injury. DVT US is neg. discussed results with patient. Unclear source of symptoms, will refer to orthopedics for further evaluation, he has no pain, recommended cryotherapy and rest.  I estimate there is LOW risk for COMPARTMENT SYNDROME, DEEP VENOUS THROMBOSIS, NECROTIZING FASCIITIS, CELLULITIS, MAJOR FRACTURE, OSTEOMYELITIS, SEPTIC ARTHRITIS, TENDON OR NEUROVASCULAR INJURY, thus I consider the discharge disposition reasonable. Sue Azul and I have discussed the diagnosis and risks, and we agree with discharging home to follow-up with their primary doctor or the referral orthopedist. We also discussed returning to the Emergency Department immediately if new or worsening symptoms occur.  We have discussed the symptoms which are most concerning (e.g., changing or worsening pain, numbness, weakness, fever, new rash, discoloration, new or worsening swelling) that necessitate immediate return. I have independently evaluated this patient. Final Impression  1. Leg swelling        Blood pressure (!) 146/84, pulse 58, temperature 99.2 °F (37.3 °C), temperature source Oral, resp. rate 16, height 5' 10\" (1.778 m), weight 245 lb (111.1 kg), SpO2 97 %. Disposition:  Discharge to home in stable condition. Patient was given scripts for the following medications. I counseled patient how to take these medications. Discharge Medication List as of 7/31/2020  5:32 PM          [unfilled]    This chart was generated using the [address] [place] [address] dictation system. I created this record but it may contain dictation errors given the limitations of this technology.        Gatito Bennett PA-C  07/31/20 [id number]

## 2020-08-05 ENCOUNTER — OFFICE VISIT (OUTPATIENT)
Dept: ORTHOPEDIC SURGERY | Age: 65
End: 2020-08-05
Payer: COMMERCIAL

## 2020-08-05 VITALS
HEART RATE: 74 BPM | HEIGHT: 70 IN | RESPIRATION RATE: 16 BRPM | WEIGHT: 249.6 LBS | BODY MASS INDEX: 35.73 KG/M2 | OXYGEN SATURATION: 96 %

## 2020-08-05 PROCEDURE — 3017F COLORECTAL CA SCREEN DOC REV: CPT | Performed by: PHYSICIAN ASSISTANT

## 2020-08-05 PROCEDURE — 1036F TOBACCO NON-USER: CPT | Performed by: PHYSICIAN ASSISTANT

## 2020-08-05 PROCEDURE — 1123F ACP DISCUSS/DSCN MKR DOCD: CPT | Performed by: PHYSICIAN ASSISTANT

## 2020-08-05 PROCEDURE — 4040F PNEUMOC VAC/ADMIN/RCVD: CPT | Performed by: PHYSICIAN ASSISTANT

## 2020-08-05 PROCEDURE — G8427 DOCREV CUR MEDS BY ELIG CLIN: HCPCS | Performed by: PHYSICIAN ASSISTANT

## 2020-08-05 PROCEDURE — G8417 CALC BMI ABV UP PARAM F/U: HCPCS | Performed by: PHYSICIAN ASSISTANT

## 2020-08-05 PROCEDURE — 99202 OFFICE O/P NEW SF 15 MIN: CPT | Performed by: PHYSICIAN ASSISTANT

## 2020-08-05 NOTE — PROGRESS NOTES
I reviewed and agree with the portions of the HPI, review of systems, vital documentation and plan performed by my staff and have added/addended where appropriate. Review of Systems   Constitutional: Negative. HENT: Negative. Eyes: Negative. Respiratory: Negative. Cardiovascular: Positive for leg swelling. Gastrointestinal: Negative. Genitourinary: Negative. Musculoskeletal: Positive for arthralgias. Skin: Negative. Neurological: Negative. Psychiatric/Behavioral: Negative. Tyna Fothergill is a 72 y.o. male Patient that presents in office as an ED follow up from [facility] visit where he presented in the ED on July 31, 2020 with complaints of left leg swelling that began several days prior immediately after he stood up. Patient states that he's not having any pain within the leg or calf muscle, but has a visible bump along the tibia which is not painful or changed in size. Doppler was performed to rule out the presence of a DVT and previous x-rays taken in the ED were negative for osseous abnormality. Hx of previous tib fib fracture about 40 years along with total knee replacement by Dr. Yaz Newsome. Patient states that it's hard to describe issues that he's having with his gait, but feels like his stride has changed or had a change in length of the leg. Patient has been taking Ibuprofen and elevating the leg to relieve symptoms which have not subsided. Denies injury or conservative therapies at this time.          Past Medical History:   Diagnosis Date    Arthritis     Bipolar 1 disorder ([address]     [facility]     \"use to have copd but is better\"    Hx of blood clots     \"had blood clot in lung and in my legs- that was in 2016- on Eliquis\"    Hyperlipidemia     Hypertension     Neuropathy     Schizo affective schizophrenia ([address]     \"follow with Dr Romana Mclaughlin [facility] [address] of nervous system        Past Surgical History:   Procedure Laterality Date    [address]ONOSCOPY  last one 2012   [address]      \"wisdom teeth put to sleep- yrs ago\"    FRACTURE SURGERY  age 22    fx left fibia and tibia- hardware later removed    HYDROCELE EXCISION  10+ yrs ago    IR IVC FILTER PLACEMENT W IMAGING N/A 07/14/2016    [employer] filter lot#[id number], exp - Dr Raines Jonnie  2012    L knee    UPPER GASTROINTESTINAL ENDOSCOPY  04/12/2018       Family History   Problem Relation Age of Onset    Cancer Mother         breast ca age 39, then later had someother form of cancer?unsure what it was    Heart Disease Father     Stroke Father     Cancer Sister     No Known Problems Brother        Social History     Socioeconomic History    Marital status: Single     Spouse name: None    Number of children: None    Years of education: None    Highest education level: None   Occupational History    None   Social Needs    Financial resource strain: None    Food insecurity     Worry: None     Inability: None    Transportation needs     Medical: None     Non-medical: None   Tobacco Use    Smoking status: Never Smoker    Smokeless tobacco: Never Used   Substance and Sexual Activity    Alcohol use: No     Comment: stated has not had a drink in a year/\"quit drinking  30 yrs ago- use to drink on weekends- 4-5 beers\"    Drug use: Yes     Types: Marijuana     Comment: \"last used over 30 yrs ago\"    Sexual activity: None   Lifestyle    Physical activity     Days per week: None     Minutes per session: None    Stress: None   Relationships    Social connections     Talks on phone: None     Gets together: None     Attends Orthodoxy service: None     Active member of club or organization: None     Attends meetings of clubs or organizations: None     Relationship status: None    Intimate partner violence     Fear of current or ex partner: None     Emotionally abused: None     Physically abused: None     Forced sexual activity: None   Other Topics Concern    None   Social History Narrative    None       Current Outpatient Medications   Medication Sig Dispense Refill    risperiDONE (RISPERDAL) 3 MG tablet Take 3 mg by mouth nightly      aspirin 81 MG chewable tablet Take 1 tablet by mouth daily 30 tablet 3    apixaban (ELIQUIS) 5 MG TABS tablet Take 1 tablet by mouth 2 times daily 60 tablet 0    atorvastatin (LIPITOR) 40 MG tablet Take 1 tablet by mouth nightly 30 tablet 0    carvedilol (COREG) 3.125 MG tablet Take 1 tablet by mouth 2 times daily (with meals) 60 tablet 0    potassium chloride (K-DUR) 10 MEQ tablet Take 1 tablet by mouth daily 30 tablet 0    divalproex (DEPAKOTE) 500 MG DR tablet Take 500 mg by mouth 2 times daily      lithium 300 MG tablet Take 300 mg by mouth 2 times daily      LORazepam (ATIVAN) 0.5 MG tablet Take 0.5 mg by mouth 2 times daily Takes 1 pill in am, 3 at HS.  furosemide (LASIX) 40 MG tablet   Take 40 mg by mouth 2 times daily Pt takes 40 in the am and 20 at noon       No current facility-administered medications for this visit. Allergies   Allergen Reactions    Thorazine [Chlorpromazine] Other (See Comments)     \"makes me do strange things\"       Review of Systems:  See above      Physical Exam:   Pulse 74   Resp 16   Ht 5' 10\" (1.778 m)   Wt 249 lb 9.6 oz (113.2 kg)   SpO2 96%   BMI 35.81 kg/m²        Gait is Normal.   Gen/Psych:Examination reveals a pleasant individual in no acute distress. The patient is oriented to time, place and person. The patient's mood and affect are appropriate.     Lymph: The lymphatic examination bilaterally reveals all areas to be without enlargement or induration.      Skin intact without lymphadenopathy, discoloration, or abnormal temperature.      Vascular: There is intact, symmetric circulation in both lower extremities.       left Leg exam:  Sensation is normal to light touch  Inspection: No ecchymosis but mild edema over the left lateral tibial compartment  Palpation: Small palpable and mobile fluid collection felt over the lateral aspect of the tibia with no induration present. Range of motion: Left knee range of motion 0-130 degrees, left ankle range of motion dorsiflexion 10 degrees, plantarflexion 40 degrees, eversion 10 degrees, inversion 10 degrees. Strength: Left ankle:5/5 resisted extension, 5/5 resisted flexion, 5/5 eversion, 5/5 inversion  Left knee: 5/5 extension, 5/5 flexion. No varus or valgus instability, no knee joint effusion    Outside record review: Radiology reports  XR TIBIA FIBULA LEFT (2 VIEWS)  Impression    No acute osseous abnormality.  Healed fracture deformities of the tibial and    fibular diaphyses.           X-rays were reviewed of the left leg and along with the above impression I also noticed a left total knee prosthesis with no obvious complications such as loosening or periprosthetic fracture. Again there are well-healed mall united fractures of the tibial shaft and proximal fibular shaft. VL DUP LOWER EXTREMITY VENOUS LEFT  Impression    No evidence of DVT in the left lower extremity. Impression:  left lower leg swelling (possible hematoma)      Plan:    The most likely impression, expected course, diagnostic and treatment options were discussed, will proceed with:  Natural history and expected course discussed. Questions answered.   Patient Instructions   MRI ordered   Contact office once MRI is completed to schedule a virtual visit to discuss results

## 2020-08-17 ASSESSMENT — ENCOUNTER SYMPTOMS
EYES NEGATIVE: 1
RESPIRATORY NEGATIVE: 1
GASTROINTESTINAL NEGATIVE: 1

## 2020-08-19 ENCOUNTER — HOSPITAL ENCOUNTER (OUTPATIENT)
Dept: MRI IMAGING | Age: 65
Discharge: HOME | End: 2020-08-19
Payer: COMMERCIAL

## 2020-08-19 PROCEDURE — 73718 MRI LOWER EXTREMITY W/O DYE: CPT

## 2020-08-20 ENCOUNTER — VIRTUAL VISIT (OUTPATIENT)
Dept: ORTHOPEDIC SURGERY | Age: 65
End: 2020-08-20
Payer: COMMERCIAL

## 2020-08-20 PROCEDURE — 99421 OL DIG E/M SVC 5-10 MIN: CPT | Performed by: PHYSICIAN ASSISTANT

## 2020-08-20 NOTE — PROGRESS NOTES
fibular shaft without other bony abnormality noted. I affirm this is a Patient Initiated Episode with a Patient who has not had a related appointment within my department in the past 7 days or scheduled within the next 24 hours.     Patient identification was verified at the start of the visit: Yes    Total Time: minutes: 5-10 minutes    Note: not billable if this call serves to triage the patient into an appointment for the relevant concern      Dede Gaxiola

## 2020-08-20 NOTE — PATIENT INSTRUCTIONS
Follow up as needed  If symptoms become worse within the left lower extremity, contact office to schedule appointment

## 2020-11-11 ENCOUNTER — HOSPITAL ENCOUNTER (OUTPATIENT)
Age: 65
Discharge: HOME | End: 2020-11-11
Payer: COMMERCIAL

## 2020-11-11 LAB
ALBUMIN SERPL-MCNC: 4.3 GM/DL (ref 3.4–5)
ALP BLD-CCNC: 85 IU/L (ref 40–128)
ALT SERPL-CCNC: 14 U/L (ref 10–40)
AMPHETAMINES: NEGATIVE
ANION GAP SERPL CALCULATED.3IONS-SCNC: 8 MMOL/L (ref 4–16)
AST SERPL-CCNC: 12 IU/L (ref 15–37)
BACTERIA: NEGATIVE /HPF
BARBITURATE SCREEN URINE: ABNORMAL
BASOPHILS ABSOLUTE: 0.1 K/CU MM
BASOPHILS RELATIVE PERCENT: 0.8 % (ref 0–1)
BENZODIAZEPINE SCREEN, URINE: NEGATIVE
BILIRUB SERPL-MCNC: 0.5 MG/DL (ref 0–1)
BILIRUBIN URINE: NEGATIVE MG/DL
BLOOD, URINE: ABNORMAL
BUN BLDV-MCNC: 10 MG/DL (ref 6–23)
CALCIUM SERPL-MCNC: 9.2 MG/DL (ref 8.3–10.6)
CANNABINOID SCREEN URINE: NEGATIVE
CHLORIDE BLD-SCNC: 98 MMOL/L (ref 99–110)
CHOLESTEROL: 123 MG/DL
CLARITY: CLEAR
CO2: 31 MMOL/L (ref 21–32)
COCAINE METABOLITE: NEGATIVE
COLOR: YELLOW
CREAT SERPL-MCNC: 1.1 MG/DL (ref 0.9–1.3)
DIFFERENTIAL TYPE: ABNORMAL
EOSINOPHILS ABSOLUTE: 0.2 K/CU MM
EOSINOPHILS RELATIVE PERCENT: 2.7 % (ref 0–3)
GFR AFRICAN AMERICAN: >60 ML/MIN/1.73M2
GFR NON-AFRICAN AMERICAN: >60 ML/MIN/1.73M2
GLUCOSE BLD-MCNC: 91 MG/DL (ref 70–99)
GLUCOSE, URINE: NEGATIVE MG/DL
HCT VFR BLD CALC: 46.3 % (ref 42–52)
HDLC SERPL-MCNC: 56 MG/DL
HEMOGLOBIN: 14.8 GM/DL (ref 13.5–18)
IMMATURE NEUTROPHIL %: 0.3 % (ref 0–0.43)
KETONES, URINE: NEGATIVE MG/DL
LDL CHOLESTEROL DIRECT: 51 MG/DL
LEUKOCYTE ESTERASE, URINE: NEGATIVE
LITHIUM LEVEL: 0.8 MMOL/L (ref 0.6–1.2)
LYMPHOCYTES ABSOLUTE: 1.5 K/CU MM
LYMPHOCYTES RELATIVE PERCENT: 16.8 % (ref 24–44)
MCH RBC QN AUTO: 31.4 PG (ref 27–31)
MCHC RBC AUTO-ENTMCNC: 32 % (ref 32–36)
MCV RBC AUTO: 98.3 FL (ref 78–100)
MONOCYTES ABSOLUTE: 0.5 K/CU MM
MONOCYTES RELATIVE PERCENT: 6.2 % (ref 0–4)
NITRITE URINE, QUANTITATIVE: NEGATIVE
NUCLEATED RBC %: 0 %
OPIATES, URINE: NEGATIVE
OXYCODONE: NEGATIVE
PDW BLD-RTO: 12.8 % (ref 11.7–14.9)
PH, URINE: 7 (ref 5–8)
PHENCYCLIDINE, URINE: NEGATIVE
PLATELET # BLD: 190 K/CU MM (ref 140–440)
PMV BLD AUTO: 10.3 FL (ref 7.5–11.1)
POTASSIUM SERPL-SCNC: 3.9 MMOL/L (ref 3.5–5.1)
PROTEIN UA: NEGATIVE MG/DL
RBC # BLD: 4.71 M/CU MM (ref 4.6–6.2)
RBC URINE: 2 /HPF (ref 0–3)
SEGMENTED NEUTROPHILS ABSOLUTE COUNT: 6.3 K/CU MM
SEGMENTED NEUTROPHILS RELATIVE PERCENT: 73.2 % (ref 36–66)
SODIUM BLD-SCNC: 137 MMOL/L (ref 135–145)
SPECIFIC GRAVITY UA: 1.01 (ref 1–1.03)
SQUAMOUS EPITHELIAL: <1 /HPF
T3 FREE: 2.7 PG/ML (ref 2.3–4.2)
T4 FREE: 1.01 NG/DL (ref 0.9–1.8)
TOTAL IMMATURE NEUTOROPHIL: 0.03 K/CU MM
TOTAL NUCLEATED RBC: 0 K/CU MM
TOTAL PROTEIN: 6.9 GM/DL (ref 6.4–8.2)
TRICHOMONAS: ABNORMAL /HPF
TRIGL SERPL-MCNC: 111 MG/DL
TSH HIGH SENSITIVITY: 2.39 UIU/ML (ref 0.27–4.2)
UROBILINOGEN, URINE: NORMAL MG/DL (ref 0.2–1)
VALPROIC ACID LEVEL: 51.1 UG/ML (ref 50–100)
WBC # BLD: 8.6 K/CU MM (ref 4–10.5)
WBC UA: ABNORMAL /HPF (ref 0–2)

## 2020-11-11 PROCEDURE — 84443 ASSAY THYROID STIM HORMONE: CPT

## 2020-11-11 PROCEDURE — 80061 LIPID PANEL: CPT

## 2020-11-11 PROCEDURE — 80053 COMPREHEN METABOLIC PANEL: CPT

## 2020-11-11 PROCEDURE — 84481 FREE ASSAY (FT-3): CPT

## 2020-11-11 PROCEDURE — 36415 COLL VENOUS BLD VENIPUNCTURE: CPT

## 2020-11-11 PROCEDURE — 84439 ASSAY OF FREE THYROXINE: CPT

## 2020-11-11 PROCEDURE — 83721 ASSAY OF BLOOD LIPOPROTEIN: CPT

## 2020-11-11 PROCEDURE — 80164 ASSAY DIPROPYLACETIC ACD TOT: CPT

## 2020-11-11 PROCEDURE — 81001 URINALYSIS AUTO W/SCOPE: CPT

## 2020-11-11 PROCEDURE — 85025 COMPLETE CBC W/AUTO DIFF WBC: CPT

## 2020-11-11 PROCEDURE — 80178 ASSAY OF LITHIUM: CPT

## 2020-11-11 PROCEDURE — 80307 DRUG TEST PRSMV CHEM ANLYZR: CPT

## 2021-02-04 ENCOUNTER — HOSPITAL ENCOUNTER (OUTPATIENT)
Dept: WOUND CARE | Age: 66
Discharge: HOME | End: 2021-02-04
Payer: COMMERCIAL

## 2021-02-04 VITALS
DIASTOLIC BLOOD PRESSURE: 68 MMHG | TEMPERATURE: 98 F | HEIGHT: 70 IN | BODY MASS INDEX: 35.79 KG/M2 | HEART RATE: 60 BPM | RESPIRATION RATE: 20 BRPM | SYSTOLIC BLOOD PRESSURE: 130 MMHG | WEIGHT: 250 LBS

## 2021-02-04 DIAGNOSIS — I87.2 VENOUS INSUFFICIENCY OF BOTH LOWER EXTREMITIES: ICD-10-CM

## 2021-02-04 DIAGNOSIS — L03.115 CELLULITIS OF LEG, RIGHT: ICD-10-CM

## 2021-02-04 DIAGNOSIS — I73.9 ARTERIAL INSUFFICIENCY OF LOWER EXTREMITY (HCC): ICD-10-CM

## 2021-02-04 DIAGNOSIS — L03.116 CELLULITIS OF LEFT LOWER LEG: ICD-10-CM

## 2021-02-04 PROCEDURE — 99212 OFFICE O/P EST SF 10 MIN: CPT

## 2021-02-04 RX ORDER — LIDOCAINE 40 MG/G
CREAM TOPICAL ONCE
Status: CANCELLED | OUTPATIENT
Start: 2021-02-04 | End: 2021-02-04

## 2021-02-04 RX ORDER — SULFAMETHOXAZOLE AND TRIMETHOPRIM 800; 160 MG/1; MG/1
1 TABLET ORAL 2 TIMES DAILY
COMMUNITY
End: 2021-02-25 | Stop reason: ALTCHOICE

## 2021-02-04 RX ORDER — CLOTRIMAZOLE AND BETAMETHASONE DIPROPIONATE 10; .64 MG/G; MG/G
CREAM TOPICAL
Qty: 45 G | Refills: 1 | Status: SHIPPED | OUTPATIENT
Start: 2021-02-04

## 2021-02-04 RX ORDER — LIDOCAINE HYDROCHLORIDE 40 MG/ML
SOLUTION TOPICAL ONCE
Status: CANCELLED | OUTPATIENT
Start: 2021-02-04 | End: 2021-02-04

## 2021-02-04 RX ORDER — LIDOCAINE 50 MG/G
OINTMENT TOPICAL ONCE
Status: CANCELLED | OUTPATIENT
Start: 2021-02-04 | End: 2021-02-04

## 2021-02-04 RX ORDER — CLOBETASOL PROPIONATE 0.5 MG/G
OINTMENT TOPICAL ONCE
Status: CANCELLED | OUTPATIENT
Start: 2021-02-04 | End: 2021-02-04

## 2021-02-04 NOTE — PROGRESS NOTES
[facility], [place] Initial Visit      Lazara Thomas  AGE: 72 y.o. GENDER: male  : 1955  EPISODE DATE:  2021   Referred by: EMPERATRIZ Yates-RADHA    Subjective:     CHIEF COMPLAINT cellulitis legs     HISTORY of PRESENT ILLNESS      Lazara Thomas is a 72 y.o. male who presents to the [address] [place] [address] for an initial visit for evaluation and treatment of Acute arterial  ulcer(s) of  [place]. Lower exremities. The condition is of moderate severity. The ulcer has been present for 10 days . The underlying cause is thought to be arterial.  The patients care to date has included sulaiman woodruff. The patient has significant underlying medical conditions as below.      Wound Pain Timing/Severity: none  Quality of pain: N/A  Severity of pain:  0 / 10   Modifying Factors: edema, venous stasis, obesity and arterial insufficiency  Associated Signs/Symptoms: none        PAST MEDICAL HISTORY        Diagnosis Date    Arthritis     Bipolar 1 disorder (HCC)     COPD (chronic obstructive [facility] disease) [facility]     \"use to have copd but is better\"    Hx of blood clots     \"had blood clot in lung and in my legs- that was in 2016- on Eliquis\"    Hyperlipidemia     Hypertension     Neuropathy     Schizo affective schizophrenia ([facility]     \"follow with Dr Almas Brooks at [address] [place] [address] of nervous system     WD-Arterial insufficiency of lower extremity ([facility] 2021    WD-Cellulitis of left lower leg 2021    WDCellulitis of leg, right 2021       PAST SURGICAL HISTORY    Past Surgical History:   Procedure Laterality Date    COLONOSCOPY  last one     DENTAL SURGERY      \"wisdom teeth put to sleep- yrs ago\"    FRACTURE SURGERY  age 22    fx left fibia and tibia- hardware later removed    HYDROCELE EXCISION  10+ yrs ago    IR IVC FILTER PLACEMENT W IMAGING N/A 2016    ALN filter [id number], exp - Dr Jules Phan      L knee    UPPER GASTROINTESTINAL ENDOSCOPY  2018 FAMILY HISTORY    Family History   Problem Relation Age of Onset   [address] [facility] [address] Cancer Mother         breast ca age 39, then later had someother form of cancer?unsure what it was    Heart Disease Father     Stroke Father     Cancer Sister     No Known Problems Brother        SOCIAL HISTORY    Social History     Tobacco Use    Smoking status: Never Smoker    Smokeless tobacco: Never Used   Substance Use Topics    Alcohol use: No     Comment: stated has not had a drink in a year/\"quit drinking  30 yrs ago- use to drink on weekends- 4-5 beers\"    Drug use: Not Currently     Types: Marijuana     Comment: \"last used over 30 yrs ago\"       ALLERGIES    Allergies   Allergen Reactions    Thorazine [Chlorpromazine] Other (See Comments)     \"makes me do strange things\"       MEDICATIONS    Current Outpatient Medications on File Prior to Encounter   Medication Sig Dispense Refill    sulfamethoxazole-trimethoprim (BACTRIM DS;SEPTRA DS) 800-160 MG per tablet Take 1 tablet by mouth 2 times daily      risperiDONE (RISPERDAL) 3 MG tablet Take 3 mg by mouth nightly      aspirin 81 MG chewable tablet Take 1 tablet by mouth daily 30 tablet 3    apixaban (ELIQUIS) 5 MG TABS tablet Take 1 tablet by mouth 2 times daily 60 tablet 0    atorvastatin (LIPITOR) 40 MG tablet Take 1 tablet by mouth nightly 30 tablet 0    carvedilol (COREG) 3.125 MG tablet Take 1 tablet by mouth 2 times daily (with meals) 60 tablet 0    potassium chloride (K-DUR) 10 MEQ tablet Take 1 tablet by mouth daily 30 tablet 0    divalproex (DEPAKOTE) 500 MG DR tablet Take 500 mg by mouth 2 times daily      lithium 300 MG tablet Take 300 mg by mouth 2 times daily      furosemide (LASIX) 40 MG tablet   Take 40 mg by mouth 2 times daily Pt takes 40 in the am and 20 at noon       No current facility-administered medications on file prior to encounter.         PROBLEM LIST    Patient Active Problem List   Diagnosis    SOB (shortness of breath) on exertion    Acute pulmonary embolism (HCC)    Gait disturbance    Dizzy    Morbid obesity (HCC)    Bipolar disorder (HCC)    History of DVT of lower extremity    General weakness    Abnormal EKG    Exertional dyspnea    Sinus bradycardia    Pulmonary embolism without acute cor pulmonale (HCC)    Morbid obesity due to excess calories (HCC)    CIDP (chronic inflammatory demyelinating polyneuropathy) (HCC)    Bipolar disorder, current episode mixed, moderate (HCC)    WD-Cellulitis of left lower leg    WDCellulitis of leg, right    WD-Arterial insufficiency of lower extremity (HCC)       REVIEW OF SYSTEMS          Objective:      /68   Pulse 60   Temp 98 °F (36.7 °C)   Resp 20   Ht 5' 10\" (1.778 m)   Wt 250 lb (113.4 kg)   BMI 35.87 kg/m²     PHYSICAL EXAM  Constitutional: Negative for systemic symptoms including fever, chills and malaise. Dermatologic exam: Visual inspection of the periwound reveals the skin to be sclerotic, atrophic and edematous  Wound exam: see wound description below in procedure note      Assessment:       Apolinar Oconnor  appears to have a non-healing wound of the leg. The etiology of the wound is felt to be arterial. There are multiple complicating factors including edema, venous stasis, lymphedema, obesity and arterial insufficiency. A comprehensive wound management program would be helpful to heal this wound. Assessments completed include fall risk and nutritional, functional,and psychological status. At this time appropriate care would include: periodic debridement and wound care as below.        Problem List Items Addressed This Visit     WD-Cellulitis of left lower leg    WDCellulitis of leg, right    [facility]          Incision 09/28/17 Back Mid (Active)   Number of days: 1224         Plan:     Discharge instructions:    Discharge Instructions       PHYSICIAN ORDERS AND DISCHARGE INSTRUCTIONS    NOTE: Upon discharge from the Wound Center, you will receive a patient experience survey. We would be grateful if you would take the time to fill this survey out. Wound care order history:     DIEGO's   Right  1.6     Left 1.32   Date: 2/4/21   Cultures:     Grafts:      Antibiotics:        Continuing wound care orders and information:                Residence:                Continue home health care with:    Your wound-care supplies will be provided by: Wound cleansing:     Do not scrub or use excessive force. Wash hands with soap and water before and after dressing changes. Prior to applying a clean dressing, cleanse wound with normal saline, wound cleanser, or mild soap and water. Ask the physician or nurse before getting the wound(s) wet in a shower    Daily Wound management:   Keep weight off wounds and reposition every 2 hours. Avoid standing for long periods of time. Apply wraps/stockings in AM and remove at bedtime. If swelling is present, elevate legs to the level of the heart or above for 30 minutes 4-5 times a day and/or when sitting. When taking antibiotics take entire prescription as ordered by physician do not stop taking until medicine is all gone. Orders for this week: 2/4/21    Arterial Studies ordered 2/4/21    Bilateral Lower Extremities - Clean with soap and water, pat dry. Apply Lotrisone (Clobetasol) to lower legs and Tubi G. Follow up with Dr Laura Bonilla in 1 week in the wound care center  Call [phone] for any questions or concerns.   Date__________   Time____________          Treatment Note      Written Patient Dismissal Instructions Given            Electronically signed by Zach White MD on 2/4/2021 at 9:12 AM

## 2021-02-05 ENCOUNTER — TELEPHONE (OUTPATIENT)
Dept: WOUND CARE | Age: 66
End: 2021-02-05

## 2021-02-11 ENCOUNTER — HOSPITAL ENCOUNTER (OUTPATIENT)
Dept: WOUND CARE | Age: 66
Discharge: HOME | End: 2021-02-11
Payer: COMMERCIAL

## 2021-02-11 VITALS
RESPIRATION RATE: 20 BRPM | DIASTOLIC BLOOD PRESSURE: 60 MMHG | SYSTOLIC BLOOD PRESSURE: 140 MMHG | TEMPERATURE: 96.7 F | HEART RATE: 54 BPM

## 2021-02-11 DIAGNOSIS — I73.9 ARTERIAL INSUFFICIENCY OF LOWER EXTREMITY (HCC): ICD-10-CM

## 2021-02-11 DIAGNOSIS — L03.116 CELLULITIS OF LEFT LOWER LEG: Primary | ICD-10-CM

## 2021-02-11 DIAGNOSIS — I87.2 VENOUS INSUFFICIENCY OF BOTH LOWER EXTREMITIES: ICD-10-CM

## 2021-02-11 DIAGNOSIS — L03.115 CELLULITIS OF LEG, RIGHT: ICD-10-CM

## 2021-02-11 PROCEDURE — 99212 OFFICE O/P EST SF 10 MIN: CPT

## 2021-02-11 RX ORDER — LIDOCAINE 50 MG/G
OINTMENT TOPICAL ONCE
Status: CANCELLED | OUTPATIENT
Start: 2021-02-11 | End: 2021-02-11

## 2021-02-11 RX ORDER — LIDOCAINE 40 MG/G
CREAM TOPICAL ONCE
Status: CANCELLED | OUTPATIENT
Start: 2021-02-11 | End: 2021-02-11

## 2021-02-11 RX ORDER — LIDOCAINE HYDROCHLORIDE 40 MG/ML
SOLUTION TOPICAL ONCE
Status: CANCELLED | OUTPATIENT
Start: 2021-02-11 | End: 2021-02-11

## 2021-02-11 RX ORDER — CLOTRIMAZOLE AND BETAMETHASONE DIPROPIONATE 10; .64 MG/G; MG/G
CREAM TOPICAL
Qty: 45 G | Refills: 1 | Status: SHIPPED | OUTPATIENT
Start: 2021-02-11

## 2021-02-11 RX ORDER — CLOBETASOL PROPIONATE 0.5 MG/G
OINTMENT TOPICAL ONCE
Status: CANCELLED | OUTPATIENT
Start: 2021-02-11 | End: 2021-02-11

## 2021-02-11 NOTE — PROGRESS NOTES
Wound Care Center Progress Note       Teresa Vergara  AGE: 72 y.o. GENDER: male  : 1955  TODAY'S DATE:  2021        Subjective:     Chief Complaint   Patient presents with    Wound Check     belkis egs         HISTORY of PRESENT ILLNESS     Teresa Vergara is a 72 y.o. male who presents today for wound evaluation of Chronic venous wound(s) of both legs. The wound is of moderate severity. The underlying cause of the wound is venous.     Wound Pain Timing/Severity: none  Quality of pain: N/A  Severity of pain:  0 / 10   Modifying Factors: edema and venous stasis  Associated Signs/Symptoms: none        PAST MEDICAL HISTORY        Diagnosis Date    Arthritis     Bipolar 1 disorder (HCC)     COPD (chronic obstructive pulmonary disease) ([facility] [address] [address]     \"use to have copd but is better\"    Hx of blood clots     \"had blood clot in lung and in my legs- that was in 2016- on Eliquis\"    Hyperlipidemia     Hypertension     Neuropathy     Schizo affective schizophrenia ([facility] [address]     \"follow with Dr Harmony Hopkins at [address] [place] [address] of nervous system     WD-Arterial insufficiency of lower extremity ([facility] [address] 2021    WD-Cellulitis of left lower leg 2021    WD-Venous insufficiency of both lower extremities 2021    WDCellulitis of leg, right 2021       PAST SURGICAL HISTORY    Past Surgical History:   Procedure Laterality Date    COLONOSCOPY  last one     DENTAL SURGERY      \"wisdom teeth put to sleep- yrs ago\"    FRACTURE SURGERY  age 22    fx left fibia and tibia- hardware later removed    HYDROCELE EXCISION  10+ yrs ago    IR IVC FILTER PLACEMENT W IMAGING N/A 2016    AL[employer] filter lot#[id number], exp - Dr Tam Mckeon  2012    L knee    UPPER GASTROINTESTINAL ENDOSCOPY  2018       FAMILY HISTORY    Family History   Problem Relation Age of Onset    Cancer Mother         breast ca age 39, then later had someother form of cancer?unsure what it was    Heart Disease Father     Stroke Father     Cancer Sister     No Known Problems Brother        SOCIAL HISTORY    Social History     Tobacco Use    Smoking status: Never Smoker    Smokeless tobacco: Never Used   Substance Use Topics    Alcohol use: No     Comment: stated has not had a drink in a year/\"quit drinking  30 yrs ago- use to drink on weekends- 4-5 beers\"    Drug use: Not Currently     Types: Marijuana     Comment: \"last used over 30 yrs ago\"       ALLERGIES    Allergies   Allergen Reactions    Thorazine [Chlorpromazine] Other (See Comments)     \"makes me do strange things\"       MEDICATIONS    Current Outpatient Medications on File Prior to Encounter   Medication Sig Dispense Refill    sulfamethoxazole-trimethoprim (BACTRIM DS;SEPTRA DS) 800-160 MG per tablet Take 1 tablet by mouth 2 times daily      clotrimazole-betamethasone (LOTRISONE) 1-0.05 % cream Apply topically 2 times daily. 45 g 1    risperiDONE (RISPERDAL) 3 MG tablet Take 3 mg by mouth nightly      aspirin 81 MG chewable tablet Take 1 tablet by mouth daily 30 tablet 3    apixaban (ELIQUIS) 5 MG TABS tablet Take 1 tablet by mouth 2 times daily 60 tablet 0    atorvastatin (LIPITOR) 40 MG tablet Take 1 tablet by mouth nightly 30 tablet 0    carvedilol (COREG) 3.125 MG tablet Take 1 tablet by mouth 2 times daily (with meals) 60 tablet 0    potassium chloride (K-DUR) 10 MEQ tablet Take 1 tablet by mouth daily 30 tablet 0    divalproex (DEPAKOTE) 500 MG DR tablet Take 500 mg by mouth 2 times daily      lithium 300 MG tablet Take 300 mg by mouth 2 times daily      furosemide (LASIX) 40 MG tablet   Take 40 mg by mouth 2 times daily Pt takes 40 in the am and 20 at noon       No current facility-administered medications on file prior to encounter. REVIEW OF SYSTEMS      Constitutional: Negative for systemic symptoms including fever, chills and malaise.     Objective:      BP (!) 140/60   Pulse 54   Temp 96.7 °F (35.9 °C) (Temporal)   Resp 20     PHYSICAL EXAM  Constitutional: Negative for systemic symptoms including fever, chills and malaise. General: The patient is in no acute distress. Mental status:  Patient is appropriate, is  oriented to place and plan of care. Dermatologic exam: Visual inspection of the periwound reveals the skin to be coarse, atrophic and edematous. Wound exam:  see wound description below     All active wounds listed below with today's date are evaluated      Incision 09/28/17 Back Mid (Active)   Number of days: 1231       Assessment:       Problem List Items Addressed This Visit     WD-Cellulitis of left lower leg - Primary    Relevant Orders    Supply: Wound Cleanser    Supply: Edema Control    WDCellulitis of leg, right    Relevant Orders    Supply: Wound Cleanser    Supply: Edema Control    WD-Arterial insufficiency of lower extremity ([facility] [address]    Relevant Orders    Supply: Wound Cleanser    Supply: Edema Control    WD-Venous insufficiency of both lower extremities    Relevant Orders    Supply: Wound Cleanser    Supply: Edema Control          Wound is has slightly improved      Plan:     Discharge Instructions       PHYSICIAN ORDERS AND DISCHARGE INSTRUCTIONS     NOTE: Upon discharge from the [address], you will receive a patient experience survey. We would be grateful if you would take the time to fill this survey out.     Wound care order history:                 DIEGO's   Right  1.6     Left 1.32              Date: 2/4/21              Cultures:                Grafts:                 Antibiotics:           Continuing wound care orders and information:                 Residence:                Continue home health care with:               Your wound-care supplies will be provided by:      Wound cleansing:               Do not scrub or use excessive force. Wash hands with soap and water before and after dressing changes.               Prior to applying a clean dressing, cleanse wound with normal saline, wound cleanser, or mild soap and water. Ask the physician or nurse before getting the wound(s) wet in a shower     Daily Wound management:              Keep weight off wounds and reposition every 2 hours. Avoid standing for long periods of time. Apply wraps/stockings in AM and remove at bedtime. If swelling is present, elevate legs to the level of the heart or above for 30 minutes 4-5 times a day and/or when sitting. When taking antibiotics take entire prescription as ordered by physician do not stop taking until medicine is all gone.                                                           Orders for this week: 2/11/21     Venous Studies ordered 2/4/21 (Scheduled 2/18/21 at 12p)    Bilateral Lower Extremities - Clean with soap and water, pat dry. Mix A&D ointment with Lotrisone (Clobetasol) and apply to lower legs daily. Wear Tubi G.     Follow up with Dr Jessa Burch in 2 weeks in the wound care center  Call [phone] for any questions or concerns.   Date__________   Time____________        Treatment Note      Written Patient Dismissal Instructions Given            Electronically signed by Justice Griffiths MD on 2/11/2021 at 8:47 AM

## 2021-02-18 ENCOUNTER — HOSPITAL ENCOUNTER (OUTPATIENT)
Dept: ULTRASOUND IMAGING | Age: 66
Discharge: HOME | End: 2021-02-18
Payer: COMMERCIAL

## 2021-02-18 DIAGNOSIS — I87.2 VENOUS INSUFFICIENCY OF BOTH LOWER EXTREMITIES: ICD-10-CM

## 2021-02-18 DIAGNOSIS — I87.2 PERIPHERAL VENOUS INSUFFICIENCY: ICD-10-CM

## 2021-02-18 PROCEDURE — 93971 EXTREMITY STUDY: CPT

## 2021-02-18 PROCEDURE — 93970 EXTREMITY STUDY: CPT

## 2021-02-25 ENCOUNTER — HOSPITAL ENCOUNTER (OUTPATIENT)
Dept: WOUND CARE | Age: 66
Discharge: HOME | End: 2021-02-25
Payer: COMMERCIAL

## 2021-02-25 VITALS
RESPIRATION RATE: 20 BRPM | SYSTOLIC BLOOD PRESSURE: 123 MMHG | HEART RATE: 51 BPM | TEMPERATURE: 97 F | DIASTOLIC BLOOD PRESSURE: 60 MMHG

## 2021-02-25 DIAGNOSIS — L03.115 CELLULITIS OF LEG, RIGHT: ICD-10-CM

## 2021-02-25 DIAGNOSIS — I73.9 ARTERIAL INSUFFICIENCY OF LOWER EXTREMITY (HCC): ICD-10-CM

## 2021-02-25 DIAGNOSIS — I89.0 LYMPHEDEMA OF BOTH LOWER EXTREMITIES: ICD-10-CM

## 2021-02-25 DIAGNOSIS — L03.116 CELLULITIS OF LEFT LOWER LEG: Primary | ICD-10-CM

## 2021-02-25 DIAGNOSIS — I87.2 VENOUS INSUFFICIENCY OF BOTH LOWER EXTREMITIES: ICD-10-CM

## 2021-02-25 DIAGNOSIS — L03.116 CELLULITIS OF LEFT LOWER LEG: ICD-10-CM

## 2021-02-25 PROCEDURE — 99212 OFFICE O/P EST SF 10 MIN: CPT

## 2021-02-25 RX ORDER — LIDOCAINE HYDROCHLORIDE 40 MG/ML
SOLUTION TOPICAL ONCE
Status: CANCELLED | OUTPATIENT
Start: 2021-02-25 | End: 2021-02-25

## 2021-02-25 RX ORDER — LIDOCAINE 50 MG/G
OINTMENT TOPICAL ONCE
Status: CANCELLED | OUTPATIENT
Start: 2021-02-25 | End: 2021-02-25

## 2021-02-25 RX ORDER — LIDOCAINE 40 MG/G
CREAM TOPICAL ONCE
Status: CANCELLED | OUTPATIENT
Start: 2021-02-25 | End: 2021-02-25

## 2021-02-25 RX ORDER — CLOBETASOL PROPIONATE 0.5 MG/G
OINTMENT TOPICAL ONCE
Status: CANCELLED | OUTPATIENT
Start: 2021-02-25 | End: 2021-02-25

## 2021-02-25 NOTE — PROGRESS NOTES
Age of Onset   [de-identified] Cancer Mother         breast ca age 39, then later had someother form of cancer?unsure what it was    Heart Disease Father     Stroke Father     Cancer Sister     No Known Problems Brother        SOCIAL HISTORY    Social History     Tobacco Use    Smoking status: Never Smoker    Smokeless tobacco: Never Used   Substance Use Topics    Alcohol use: No     Comment: stated has not had a drink in a year/\"quit drinking  30 yrs ago- use to drink on weekends- 4-5 beers\"    Drug use: Not Currently     Types: Marijuana     Comment: \"last used over 30 yrs ago\"       ALLERGIES    Allergies   Allergen Reactions    Thorazine [Chlorpromazine] Other (See Comments)     \"makes me do strange things\"       MEDICATIONS    Current Outpatient Medications on File Prior to Encounter   Medication Sig Dispense Refill    clotrimazole-betamethasone (LOTRISONE) 1-0.05 % cream Apply topically 2 times daily. 45 g 1    clotrimazole-betamethasone (LOTRISONE) 1-0.05 % cream Apply topically 2 times daily. 45 g 1    risperiDONE (RISPERDAL) 3 MG tablet Take 3 mg by mouth nightly      aspirin 81 MG chewable tablet Take 1 tablet by mouth daily 30 tablet 3    apixaban (ELIQUIS) 5 MG TABS tablet Take 1 tablet by mouth 2 times daily 60 tablet 0    atorvastatin (LIPITOR) 40 MG tablet Take 1 tablet by mouth nightly 30 tablet 0    carvedilol (COREG) 3.125 MG tablet Take 1 tablet by mouth 2 times daily (with meals) 60 tablet 0    potassium chloride (K-DUR) 10 MEQ tablet Take 1 tablet by mouth daily 30 tablet 0    divalproex (DEPAKOTE) 500 MG DR tablet Take 500 mg by mouth 2 times daily      lithium 300 MG tablet Take 300 mg by mouth 2 times daily      furosemide (LASIX) 40 MG tablet   Take 40 mg by mouth 2 times daily Pt takes 40 in the am and 20 at noon       No current facility-administered medications on file prior to encounter.         REVIEW OF SYSTEMS      Constitutional: Negative for systemic symptoms including fever, chills and malaise. Objective:      /60   Pulse 51   Temp 97 °F (36.1 °C) (Temporal)   Resp 20     PHYSICAL EXAM  Constitutional: Negative for systemic symptoms including fever, chills and malaise. General: The patient is in no acute distress. Mental status:  Patient is appropriate, is  oriented to place and plan of care. Dermatologic exam: Visual inspection of the periwound reveals the skin to be coarse and edematous. Wound exam:  see wound description below     All active wounds listed below with today's date are evaluated      Incision 09/28/17 Back Mid (Active)   Number of days: 1245       Assessment:       Problem List Items Addressed This Visit     WD-Cellulitis of left lower leg - Primary    Relevant Orders    Supply: Wound Cleanser    Supply: Edema Control    WDCellulitis of leg, right    Relevant Orders    Supply: Wound Cleanser    Supply: Edema Control    WD-Arterial insufficiency of lower extremity ([facility] [address]    Relevant Orders    Supply: Wound Cleanser    Supply: Edema Control    WD-Venous insufficiency of both lower extremities    Relevant Orders    Supply: Wound Cleanser    Supply: Edema Control    WD-Lymphedema of both lower extremities          Wound is has slightly improved      Plan:     Discharge Instructions       PHYSICIAN ORDERS AND DISCHARGE INSTRUCTIONS     NOTE: Upon discharge from the [address], you will receive a patient experience survey.  We would be grateful if you would take the time to fill this survey out.     Wound care order history:                 DIEGO's   Right  1.6     Left 1.32              Date: 2/4/21              Cultures:                Grafts:                 Antibiotics:           Continuing wound care orders and information:                 Residence:                Continue home health care with:               Your wound-care supplies will be provided by:      Wound cleansing:               CQ not scrub or use excessive force.              Wash hands with soap and water before and after dressing changes.             HDTBW to applying a clean dressing, cleanse wound with normal saline, wound cleanser, or mild soap and water.               Ask the physician or nurse before getting the wound(s) wet in a shower     Daily Wound management:              Keep weight off wounds and reposition every 2 hours.              Avoid standing for long periods of time.              Apply wraps/stockings in AM and remove at bedtime.              If swelling is present, elevate legs to the level of the heart or above for 30 minutes 4-5 times a day and/or when sitting.                                      When taking antibiotics take entire prescription as ordered by physician do not stop taking until medicine is all gone.                                                           Orders for this week: 2/11/21     Venous Studies ordered 2/4/21 (Scheduled 2/18/21 at 12p)     Bilateral Lower Extremities - Clean with soap and water, pat dry. A&D  to lower legs daily. Wear Tubi G.     Discharged  Call  for any questions or concerns.   Date__________   Time____________        Treatment Note      Written Patient Dismissal Instructions Given            Electronically signed by Tami Zaldivar MD on 2/25/2021 at 8:32 AM

## 2021-03-11 ENCOUNTER — HOSPITAL ENCOUNTER (OUTPATIENT)
Dept: PHYSICAL THERAPY | Age: 66
Setting detail: THERAPIES SERIES
Discharge: HOME | End: 2021-03-11
Payer: COMMERCIAL

## 2021-03-11 PROCEDURE — 97162 PT EVAL MOD COMPLEX 30 MIN: CPT

## 2021-03-11 PROCEDURE — 97535 SELF CARE MNGMENT TRAINING: CPT

## 2021-03-11 PROCEDURE — 97140 MANUAL THERAPY 1/> REGIONS: CPT

## 2021-03-11 NOTE — PROGRESS NOTES
PHYSICAL THERAPY LYMPHEDEMA EVAL    Patient Name:  Scott Kelly Date:  3/11/2021  :  1955 MRN: [id number]  Referring Physician:  Dr. Josue Rahman Diagnosis:  BLE lymphedema    SUBJECTIVE    History of edema:  On diuretics, history of multiple episodes of cellulitis for over 10 years, some episodes of lymphorrhea. No longer on antibiotics. Le swelling for years, progressively worsening swelling and infecitons. Improved with: antibiotics, compression - wears tubigrip. Improved overnight  Worsened with:  infecction and dependency. Functional Capacity:  independent  Edema Induced Limitations: shoes    Assistance available for treatment:  self  Home/social/DME: none  Financial resources available for treatment: insurance primary     Pain:  denies    Patient Goal(s):  Decrease swelling    OBJECTIVE    Other medical conditions/surgeries: L TKA 8yrs. LLE compound fx tib/fib fx ORIF as child,     General Observations of Functional Mobility:  I in basic mobilty    Description of Edema:  hard edema at legs, firm at feet. Observations of Skin:  Dry, scaly with adherent thick scales. Reddened legs, texture changes B legs     Scars:  Surgical LLE, from wounds B anterior    Skin breakdown (current or previous):  None current    Initial Basic Measures      LE Right Left   Met heads 29.1 28.8   Transmalleolar 34.7 36.0   10cm 31.8 28.8   20cm 34.2 32.9   30cm 44.6 46.0   40cm 50.5 49.4   50cm 46.5 48.8   60cm 57.2 55.5       SEVERITY as % of Impairment, per Lymphedema Life Impact Score 30%  ASSESSMENT         Pt presents with a protracted history of B leg swelling and multiple episodes of cellulitis and lymphorrhea. He has significant reddening of his legs, hard scaly adherent dry skin.     Secondary Lymphedema, Phlebo-Lymphostasis,     Staging Of Lymphedema Staging Of Chronic Venous Insufficiency   [] 1:  Reversible edema [] 1:  Reversible edema    [] pitting  [] perimalleolar varicosities    [] borderline Stemmer's sign  [] corona phlebactacia paraplantaris veins   [] 2:  Irreversible edema [] 2:  Stage one plus     [] nonpitting  [x]Irreversible edema    [x] firm edema  [x] hyperpigmentation    [x] positive Stemmer's sign  [x] eczematous skin    [] proliferation of skin  [x] trophic skin changes   [] 3:  Irreversible edema  [x] proliferation of skin    [] nonpitting [] 3: Stage one and two, plus    [x] hard edema  [] stasis ulcers    [x] positive Stemmer's sign  [] ulcer scars    [] elephantiasis     [] Hyperkeratosis     [] papilloma      Bariers to Learning:  none  Patient Motivation:  good  Improvement Potential:  good        Other Health Services being provided: none      Factors that impact severity of disorder:      [] Age:       [x] Time since onset:       []   Other:        [] None    When discharged from Part A services:  n/a    Prior therapy for same condition:    Yes      []          No   [x]    Present Health:  Excellent  []    Good  [x]    Fair  []  Poor   []        Prior level of function:  Pt was independent with ADL's without significant pain or difficulties. DME in use:none         PLAN  After eval, patient will be seen for 2 times per week for 6 weeks. Measure for garments and order garments by the end of treatment week number 4. Treatment will include pt ed, ther ex, self care/ADL training, manual therapy: manual lymphatic drainage, compression garment measuring and ordering. GOALS  Due by 4/23/21.  1 [place] with HEP.  2 Compliance with elevation daily. 3 Independent with self massage principles. 4 Acquire and wear correctly fitting compression garments for BLE.  5 Consistent reduction of edema as evidenced by regular girth measures. TREATMENT TODAY   Instruct and perform central clearing. Discuss use of compression - socks, may consider stockings/thigh high. May be candidate for pump.   Pt was instructed/educated on the importance of proper hydration (even if on diuretics). Also to monitor salt intake - including prepackaged foods, soft drinks, breads, cereals, soups/broths, lunch/deli meats, snack foods, fast foods, etc.  Explain general anatomy and function of the lymphatic system, effects of treatment, compression, exercise/muscle pump, elevation. Time In:  0930   Time Out: 1027    Timed Code Treatment Minutes: 28   Total Treatment Minutes: 57    Electronically signed by:  Nirali Bell PT, 3/11/2021, 9:38 AM        Physical Therapy Certification Form    Please see the initial evaluation above for the initial plan of care. Electronically signed by:  Nirali Bell PT    If you have any questions or concerns, please don't hesitate to call. The outpatient phone and fax numbers are listed below. Thank you for your referral.      Physician Signature:________________________________Date:__________________  By signing above, therapists plan is approved by physician  Please fax back if you agree with this plan.       [facility]       [x] Phone: [phone]   Fax: [phone]   [facility]     [] Phone: [phone]   Fax: [phone]          [] Phone: [phone]   Fax: [phone]  [facility]           [] Phone: [phone]   Fax: [phone]    Pediatric Therapy          [] Phone: [phone]   Fax: [phone]  [facility]          [] Phone: [phone]   Fax: [phone]

## 2021-03-22 ENCOUNTER — HOSPITAL ENCOUNTER (OUTPATIENT)
Dept: PHYSICAL THERAPY | Age: 66
Setting detail: THERAPIES SERIES
Discharge: HOME | End: 2021-03-22
Payer: COMMERCIAL

## 2021-03-22 PROCEDURE — 97140 MANUAL THERAPY 1/> REGIONS: CPT

## 2021-03-22 NOTE — FLOWSHEET NOTE
Physical Therapy Treatment Note   Date:  3/22/2021    Patient Name: Scott Kelly   Diagnosis: BLE lymphedema   :  1955       Treatment Diagnosis:  Same   MRN: [id number]        Restrictions/Precautions: Insurance/Certification information:  insurance primary        Visit# / total visits:   Missed visits:   0   POC expiration date:  Due by 21 Plan of care signed (Y/N):  n       Initial Pain level: 0/10  Post Tx Pain Ratin/10   Changes/updates to ambulatory history sheet? Subjective: Pt stated he feels like his legs are getting smaller. Manual Lymphatic Drainage:  MLD was performed today for central clearing and for appropriate extremity drainage. Appropriate regions were elevated during the manual lymphatic drainage. Therapeutic Exercise:  Therapeutic exercises were instructed today. Cues were given for technique, safety, recruitment, and rationale. Cues were verbal and/or tactile. Performed muscle pumping activities to circulate fluids and stimulate the lymphatic system. ADL/Self-Care Training: This patient was instructed in strategies to help improve self management of edema and to improve circulation. Training today included recommendations and discussion regarding purposeful elevation, sleep positioning, and general movements to pump muscles for circulatory benefit. Additionally, education was provided regarding compression garments and/or compression bandaging, including wear schedule, donning, removal, and laundering.         Objective Findings:         LE Right 3/22/21 R   Met heads 29.1 29.0   Transmalleolar 34.7 33.8   10cm 31.8 31.3   20cm 34.2 33.5   30cm 44.6 44.2   40cm 50.5 50.0   50cm 46.5 43.0   60cm 57.2 55.0           LE Left 3/22/21 L   Met heads 28.8 28.0   Transmalleolar 36.0 34.0   10cm 28.8 29.6   20cm 32.9 32.0   30cm 46.0 45.2   40cm 49.4 47.5   50cm 48.8 44.0   60cm 55.5 51.0        Assessment: Pt demonstrated GOOD tolerance to tx today with good lymph fluid circulation. Reports self- MLD x 1day. De,omstrated decreased volume. Reviewed elevation benefits and AROM. Tolerance for treatment:  good  Adverse reactions totreatment:  none    Plan:   PLAN  After eval, patient will be seen for 2 times per week for 6 weeks. Measure for garments and order garments by the end of treatment week number 4. Treatment will include pt ed, ther ex, self care/ADL training, manual therapy: manual lymphatic drainage, compression garment measuring and ordering.       GOALS  Due by 4/23/21.  1 [place] with HEP.  2 Compliance with elevation daily. 3 Independent with self massage principles. 4 Acquire and wear correctly fitting compression garments for BLE.  5 Consistent reduction of edema as evidenced by regular girth measures.     [x] Continue per plan of care   [] Alter current plan   [] Hold pending MD visit   [] Discharge    Plan for Next Session:    Progress Note Due:    Communication with other providers:      Time In: 5389 (late)  Time Out: 6754  Timed Code Treatment Minutes: 53 MT  Total Treatment Minutes: 53    Electronically signed by:  Mayank Robb PTA, CLT 3/22/21

## 2021-03-24 ENCOUNTER — HOSPITAL ENCOUNTER (OUTPATIENT)
Dept: PHYSICAL THERAPY | Age: 66
Setting detail: THERAPIES SERIES
Discharge: HOME | End: 2021-03-24
Payer: COMMERCIAL

## 2021-03-24 PROCEDURE — 97140 MANUAL THERAPY 1/> REGIONS: CPT

## 2021-03-24 NOTE — FLOWSHEET NOTE
Physical Therapy Treatment Note   Date:  3/24/2021    Patient Name: Linda Soares   Diagnosis: BLE lymphedema   :  1955       Treatment Diagnosis:  Same   MRN: [id number]        Restrictions/Precautions: Insurance/Certification information:  insurance primary        Visit# / total visits:  3/12 Missed visits:   0   POC expiration date:  Due by 21 Plan of care signed (Y/N):  n       Initial Pain level: 0/10  Post Tx Pain Ratin/10   Changes/updates to ambulatory history sheet? Subjective: Pt stated he feels like his legs are progressing well. He measures with his hand. He is wondering if he will always have to wear compression socks. Manual Lymphatic Drainage:  MLD was performed today for central clearing and for appropriate extremity drainage. Appropriate regions were elevated during the manual lymphatic drainage. Therapeutic Exercise:  Therapeutic exercises were instructed today. Cues were given for technique, safety, recruitment, and rationale. Cues were verbal and/or tactile. Performed muscle pumping activities to circulate fluids and stimulate the lymphatic system. ADL/Self-Care Training: This patient was instructed in strategies to help improve self management of edema and to improve circulation. Training today included recommendations and discussion regarding purposeful elevation, sleep positioning, and general movements to pump muscles for circulatory benefit. Additionally, education was provided regarding compression garments and/or compression bandaging, including wear schedule, donning, removal, and laundering.         Objective Findings:    Skin looking more moisturized at arrival  Nails need addressing     LE Right 3/22/21 R 3/24/21 R   Met heads 29.1 29.0 28.0   Transmalleolar 34.7 33.8 34.0   10cm 31.8 31.3 30.5   20cm 34.2 33.5 32.5   30cm 44.6 44.2 43.4   40cm 50.5 50.0 48.2   50cm 46.5 43.0 45.0   60cm 57.2 55.0 55.4           LE Left 3/22/21 L

## 2021-03-30 ENCOUNTER — HOSPITAL ENCOUNTER (OUTPATIENT)
Dept: PHYSICAL THERAPY | Age: 66
Setting detail: THERAPIES SERIES
Discharge: HOME | End: 2021-03-30
Payer: COMMERCIAL

## 2021-03-30 PROCEDURE — 97140 MANUAL THERAPY 1/> REGIONS: CPT

## 2021-03-30 NOTE — FLOWSHEET NOTE
Physical Therapy Treatment Note   Date:  3/30/2021    Patient Name: Alexis Arm   Diagnosis: BLE lymphedema   :  1955       Treatment Diagnosis:  Same   MRN: [id number]        Restrictions/Precautions: Insurance/Certification information:  insurance primary        Visit# / total visits:   Missed visits:   0   POC expiration date:  Due by 21 Plan of care signed (Y/N):  n       Initial Pain level: 0/10  Post Tx Pain Ratin/10   Changes/updates to ambulatory history sheet? Subjective: legs are feeling softer. Getting smaller and it feels good. Manual Lymphatic Drainage:  MLD was performed today for central clearing and for appropriate extremity drainage. Appropriate regions were elevated during the manual lymphatic drainage. Therapeutic Exercise:  Therapeutic exercises were instructed today. Cues were given for technique, safety, recruitment, and rationale. Cues were verbal and/or tactile. Performed muscle pumping activities to circulate fluids and stimulate the lymphatic system. ADL/Self-Care Training: This patient was instructed in strategies to help improve self management of edema and to improve circulation. Training today included recommendations and discussion regarding purposeful elevation, sleep positioning, and general movements to pump muscles for circulatory benefit. Additionally, education was provided regarding compression garments and/or compression bandaging, including wear schedule, donning, removal, and laundering. Objective Findings:    Skin looking more moisturized at arrival, still with adherent scaling skin.    Nails need addressing     LE Right 3/22/21 R 3/24/21 R  3/30/21   Met heads 29.1 29.0 28.0 28.7   Transmalleolar 34.7 33.8 34.0 34.8   10cm 31.8 31.3 30.5 30.3   20cm 34.2 33.5 32.5 31.9   30cm 44.6 44.2 43.4 43.2   40cm 50.5 50.0 48.2 47.0   50cm 46.5 43.0 45.0 46.8   60cm 57.2 55.0 55.4            LE Left 3/22/21 L 3/24/21 L 3/30/31   Met heads 28.8 28.0 27.3 27.6   Transmalleolar 36.0 34.0 33.0 32.5   10cm 28.8 29.6 29.0 28.5   20cm 32.9 32.0 31.5 31.4   30cm 46.0 45.2 43.0 44.4   40cm 49.4 47.5 46.5 45.9   50cm 48.8 44.0 44.0 47.4   60cm 55.5 51.0 535        Assessment: Pt demonstrated GOOD tolerance to tx today with good lymph fluid circulation and decrease in measurements. Improved tissue pliability and fluid movement. Pt has been moisturizing his B LE improving the skin condition. decreased in scaling. Tolerance for treatment:  good  Adverse reactions totreatment:  none    Plan:   PLAN  After eval, patient will be seen for 2 times per week for 6 weeks. Measure for garments and order garments by the end of treatment week number 4. Treatment will include pt ed, ther ex, self care/ADL training, manual therapy: manual lymphatic drainage, compression garment measuring and ordering.       GOAL  Due by 4/23/21.  1 [place] with HEP.  2 Compliance with elevation daily. 3 Independent with self massage principles. 4 Acquire and wear correctly fitting compression garments for BLE.  5 Consistent reduction of edema as evidenced by regular girth measures. [x] Continue per plan of care   [] Alter current plan   [] Hold pending MD visit   [] Discharge    Plan for Next Session:  Consider compression socks in the next 1-2 weeks.    Progress Note Due:    Communication with other providers:      Time In: 0933   Time Out: 1026  Timed Code Treatment Minutes: 53  Total Treatment Minutes: 53    Electronically signed by:  Keren Velez, PT

## 2021-04-08 ENCOUNTER — HOSPITAL ENCOUNTER (OUTPATIENT)
Dept: PHYSICAL THERAPY | Age: 66
Setting detail: THERAPIES SERIES
Discharge: HOME | End: 2021-04-08
Payer: COMMERCIAL

## 2021-04-08 PROCEDURE — 97140 MANUAL THERAPY 1/> REGIONS: CPT

## 2021-04-08 NOTE — FLOWSHEET NOTE
Physical Therapy Treatment Note   Date:  2021    Patient Name: Sana Peters   Diagnosis: BLE lymphedema   :  1955       Treatment Diagnosis:  Same   MRN: [id number]        Restrictions/Precautions: Insurance/Certification information:  insurance primary        Visit# / total visits:   Missed visits:   0   POC expiration date:  Due by 21 Plan of care signed (Y/N):  n       Initial Pain level: 0/10  Post Tx Pain Ratin/10   Changes/updates to ambulatory history sheet? Subjective: Pt Stated He feels like he can put his shoes on better. Manual Lymphatic Drainage:  MLD was performed today for central clearing and for appropriate extremity drainage. Appropriate regions were elevated during the manual lymphatic drainage. Therapeutic Exercise:  Therapeutic exercises were instructed today. Cues were given for technique, safety, recruitment, and rationale. Cues were verbal and/or tactile. Performed muscle pumping activities to circulate fluids and stimulate the lymphatic system. ADL/Self-Care Training: This patient was instructed in strategies to help improve self management of edema and to improve circulation. Training today included recommendations and discussion regarding purposeful elevation, sleep positioning, and general movements to pump muscles for circulatory benefit. Additionally, education was provided regarding compression garments and/or compression bandaging, including wear schedule, donning, removal, and laundering. Objective Findings:    Skin looking dry at arrival, still with adherent scaling skin.    Nails need addressing     LE Right 3/22/21 R 3/24/21 R  3/30/21 4/8/21R   Met heads 29.1 29.0 28.0 28.7 28.0   Transmalleolar 34.7 33.8 34.0 34.8 34.0   10cm 31.8 31.3 30.5 30.3 29.5   20cm 34.2 33.5 32.5 31.9 31.6   30cm 44.6 44.2 43.4 43.2 41.5   40cm 50.5 50.0 48.2 47.0 47.0   50cm 46.5 43.0 45.0 46.8 45.0   60cm 57.2 55.0 55.4         LE Left 3/22/21 L 3/24/21 L 3/30/31 4/8/21 L   Met heads 28.8 28.0 27.3 27.6 27.3   Transmalleolar 36.0 34.0 33.0 32.5 33.0   10cm 28.8 29.6 29.0 28.5 28.1   20cm 32.9 32.0 31.5 31.4 30.7   30cm 46.0 45.2 43.0 44.4 43.5   40cm 49.4 47.5 46.5 45.9 46.0   50cm 48.8 44.0 44.0 47.4 46.5   60cm 55.5 51.0 535         Assessment: Pt demonstrated GOOD tolerance to tx today with good lymph fluid circulation and decrease in measurements. Tubigrip donned at departure. Decrease in measurements today. Progressing well. Look into compression socks in the next 1-2 weeks. Tolerance for treatment:  good  Adverse reactions totreatment:  none    Plan:   PLAN  After eval, patient will be seen for 2 times per week for 6 weeks. Measure for garments and order garments by the end of treatment week number 4. Treatment will include pt ed, ther ex, self care/ADL training, manual therapy: manual lymphatic drainage, compression garment measuring and ordering.       GOAL  Due by 4/23/21.  1 [place] with HEP.  2 Compliance with elevation daily. 3 Independent with self massage principles. 4 Acquire and wear correctly fitting compression garments for BLE.  5 Consistent reduction of edema as evidenced by regular girth measures. [x] Continue per plan of care   [] Alter current plan   [] Hold pending MD visit   [] Discharge    Plan for Next Session:  Consider compression socks in the next 1-2 weeks.    Progress Note Due:    Communication with other providers:      Time In: 0915   Time Out: 1010  Timed Code Treatment Minutes: 55  Total Treatment Minutes: 55    Electronically signed by: Cash Vauhgan PTA, CLT 4/8/21

## 2021-04-09 ENCOUNTER — HOSPITAL ENCOUNTER (OUTPATIENT)
Dept: PHYSICAL THERAPY | Age: 66
Setting detail: THERAPIES SERIES
Discharge: HOME | End: 2021-04-09
Payer: COMMERCIAL

## 2021-04-09 PROCEDURE — 97140 MANUAL THERAPY 1/> REGIONS: CPT

## 2021-04-09 NOTE — FLOWSHEET NOTE
Physical Therapy Treatment Note   Date:  2021    Patient Name: Carlin Vizcaino   Diagnosis: BLE lymphedema   :  1955       Treatment Diagnosis:  Same   MRN: [id number]        Restrictions/Precautions: Insurance/Certification information:  insurance primary        Visit# / total visits:   Missed visits:   0   POC expiration date:  Due by 21 Plan of care signed (Y/N):  n       Initial Pain level: 0/10  Post Tx Pain Ratin/10   Changes/updates to ambulatory history sheet? Subjective: Pt stated his legs ar bigger because he ran around a lot today. They fluctuate a lot. Manual Lymphatic Drainage:  MLD was performed today for central clearing and for appropriate extremity drainage. Appropriate regions were elevated during the manual lymphatic drainage. Therapeutic Exercise:  Therapeutic exercises were instructed today. Cues were given for technique, safety, recruitment, and rationale. Cues were verbal and/or tactile. Performed muscle pumping activities to circulate fluids and stimulate the lymphatic system. ADL/Self-Care Training: This patient was instructed in strategies to help improve self management of edema and to improve circulation. Training today included recommendations and discussion regarding purposeful elevation, sleep positioning, and general movements to pump muscles for circulatory benefit. Additionally, education was provided regarding compression garments and/or compression bandaging, including wear schedule, donning, removal, and laundering. Objective Findings:    Skin looking dry at arrival, still with adherent scaling skin.    Nails need addressing     LE Right 3/22/21 R 3/24/21 R  3/30/21 4/8/21R 21 R   Met heads 29.1 29.0 28.0 28.7 28.0 28.0   Transmalleolar 34.7 33.8 34.0 34.8 34.0 33.5   10cm 31.8 31.3 30.5 30.3 29.5 29.8   20cm 34.2 33.5 32.5 31.9 31.6 33.0   30cm 44.6 44.2 43.4 43.2 41.5 44.5   40cm 50.5 50.0 48.2 47.0 47.0

## 2021-04-13 ENCOUNTER — HOSPITAL ENCOUNTER (OUTPATIENT)
Dept: PHYSICAL THERAPY | Age: 66
Setting detail: THERAPIES SERIES
Discharge: HOME | End: 2021-04-13
Payer: COMMERCIAL

## 2021-04-13 PROCEDURE — 97140 MANUAL THERAPY 1/> REGIONS: CPT

## 2021-04-13 NOTE — FLOWSHEET NOTE
Physical Therapy Treatment Note   Date:  2021    Patient Name: Parker Ball   Diagnosis: BLE lymphedema   :  1955       Treatment Diagnosis:  Same   MRN: [id number]        Restrictions/Precautions: Insurance/Certification information:  insurance primary        Visit# / total visits:   Missed visits:   0   POC expiration date:  Due by 21 Plan of care signed (Y/N):  n       Initial Pain level: 0/10  Post Tx Pain Ratin/10   Changes/updates to ambulatory history sheet? Subjective:  Been compliant with compression wear. Feels like they are the same or maybe smaller. Feels softer than they did. Manual Lymphatic Drainage:  MLD was performed today for central clearing and for appropriate extremity drainage. Appropriate regions were elevated during the manual lymphatic drainage. Therapeutic Exercise:    Cues were given for technique, safety, recruitment, and rationale. Cues were verbal and/or tactile. Performed muscle pumping activities to circulate fluids and stimulate the lymphatic system. ADL/Self-Care Training: This patient was instructed in strategies to help improve self management of edema and to improve circulation. Training today included recommendations and discussion regarding purposeful elevation, sleep positioning, and general movements to pump muscles for circulatory benefit. Additionally, education was provided regarding compression garments and/or compression bandaging, including wear schedule, donning, removal, and laundering. Objective Findings:    Skin looking dry at arrival, still with adherent scaling skin. Most nails are trimmed. 2nd toe still long   Cutimed 10% urea applied to legs.     LE Right 3/22/21 R 3/24/21 R  3/30/21 4/8/21R 21 R 21   Met heads 29.1 29.0 28.0 28.7 28.0 28.0 28.8   Transmalleolar 34.7 33.8 34.0 34.8 34.0 33.5 35.7   10cm 31.8 31.3 30.5 30.3 29.5 29.8 29.3   20cm 34.2 33.5 32.5 31.9 31.6 33.0

## 2021-04-15 ENCOUNTER — HOSPITAL ENCOUNTER (OUTPATIENT)
Dept: PHYSICAL THERAPY | Age: 66
Setting detail: THERAPIES SERIES
Discharge: HOME | End: 2021-04-15
Payer: COMMERCIAL

## 2021-04-15 PROCEDURE — 97140 MANUAL THERAPY 1/> REGIONS: CPT

## 2021-04-15 NOTE — FLOWSHEET NOTE
Physical Therapy Treatment Note   Date:  4/15/2021    Patient Name: Yasmin León   Diagnosis: BLE lymphedema   :  1955       Treatment Diagnosis:  Same   MRN: [id number]        Restrictions/Precautions: Insurance/Certification information:  insurance primary        Visit# / total visits:   Missed visits:   0   POC expiration date:  Due by 21 Plan of care signed (Y/N):  n       Initial Pain level: 0/10  Post Tx Pain Ratin/10   Changes/updates to ambulatory history sheet? Subjective:  Been up more than usual this morning before the appointment, so they might be a bit more swollen. Does have a recliner coming, so he'll be able to elevate his feet more. Manual Lymphatic Drainage:  MLD was performed today for central clearing and for appropriate extremity drainage. Appropriate regions were elevated during the manual lymphatic drainage. Therapeutic Exercise:    Cues were given for technique, safety, recruitment, and rationale. Cues were verbal and/or tactile. Performed muscle pumping activities to circulate fluids and stimulate the lymphatic system. ADL/Self-Care Training: This patient was instructed in strategies to help improve self management of edema and to improve circulation. Training today included recommendations and discussion regarding purposeful elevation, sleep positioning, and general movements to pump muscles for circulatory benefit. Additionally, education was provided regarding compression garments and/or compression bandaging, including wear schedule, donning, removal, and laundering. Objective Findings:    Skin looking dry at arrival, still with adherent scaling skin. Most nails are trimmed. 2nd toe still long   Cutimed 10% urea applied to legs.     LE Right 3/22/21 R 3/24/21 R  3/30/21 4/8/21R 21 R 4/13/21 4/15/21   Met heads 29.1 29.0 28.0 28.7 28.0 28.0 28.8 28.0   Transmalleolar 34.7 33.8 34.0 34.8 34.0 33.5 35.7 33.5   10cm 31.8 31.3 30.5 30.3 29.5 29.8 29.3 29.5   20cm 34.2 33.5 32.5 31.9 31.6 33.0 32.2 32.4   30cm 44.6 44.2 43.4 43.2 41.5 44.5 42.6 43.2   40cm 50.5 50.0 48.2 47.0 47.0 47.5 47.3 46.4   50cm 46.5 43.0 45.0 46.8 45.0 44.0 46.8 46.0   60cm 57.2 55.0 55.4                LE Left 3/22/21 L 3/24/21 L 3/30/31 4/8/21 L 4/9/21 L 4/13/21 4/15/21   Met heads 28.8 28.0 27.3 27.6 27.3 27.0 27.0 27.0   Transmalleolar 36.0 34.0 33.0 32.5 33.0 30.0 34.0 34.1   10cm 28.8 29.6 29.0 28.5 28.1 28.0 28.8 28.7   20cm 32.9 32.0 31.5 31.4 30.7 32.0 30.9 31.2   30cm 46.0 45.2 43.0 44.4 43.5 44.3 42.8 44.0   40cm 49.4 47.5 46.5 45.9 46.0 45.8 46.0 45.5   50cm 48.8 44.0 44.0 47.4 46.5 45.5 46.0 46.8   60cm 55.5 51.0 535            Assessment: Pt demonstrated GOOD tolerance to tx today with good lymph fluid circulation and decrease and increases in measurements. Tubigrip doubled donned at departure. Look into compression socks in the next week    Tolerance for treatment:  good  Adverse reactions totreatment:  none    Plan:   PLAN  After eval, patient will be seen for 2 times per week for 6 weeks. Measure for garments and order garments by the end of treatment week number 4. Treatment will include pt ed, ther ex, self care/ADL training, manual therapy: manual lymphatic drainage, compression garment measuring and ordering.       GOAL  Due by 4/23/21.  1 [place] with HEP.  2 Compliance with elevation daily. 3 Independent with self massage principles. 4 Acquire and wear correctly fitting compression garments for BLE.  5 Consistent reduction of edema as evidenced by regular girth measures.       [x] Continue per plan of care   [] Alter current plan   [] Hold pending MD visit   [] Discharge    Plan for Next Session:  Consider compression socks mext week  Progress Note Due:    Communication with other providers:      Time In:  0900   Time Out: 0953  Timed Code Treatment Minutes: 50  Total Treatment Minutes:50    Electronically signed by:Terrence Ebb Corbett

## 2021-04-20 ENCOUNTER — HOSPITAL ENCOUNTER (OUTPATIENT)
Dept: PHYSICAL THERAPY | Age: 66
Setting detail: THERAPIES SERIES
Discharge: HOME | End: 2021-04-20
Payer: COMMERCIAL

## 2021-04-20 PROCEDURE — 97140 MANUAL THERAPY 1/> REGIONS: CPT

## 2021-04-20 NOTE — FLOWSHEET NOTE
Physical Therapy Treatment Note   Date:  2021    Patient Name: Galen Flores   Diagnosis: BLE lymphedema   :  1955       Treatment Diagnosis:  Same   MRN: [id number]        Restrictions/Precautions: Insurance/Certification information:  insurance primary        Visit# / total visits:   Missed visits:   0   POC expiration date:  Due by 21 Plan of care signed (Y/N):  n       Initial Pain level: 0/10  Post Tx Pain Ratin/10   Changes/updates to ambulatory history sheet? Subjective:  He got a recliner 3 days ago and is able to elevate his legs better now. He think that is why his legs are less swollen    Manual Lymphatic Drainage:  MLD was performed today for central clearing and for appropriate extremity drainage. Appropriate regions were elevated during the manual lymphatic drainage. Therapeutic Exercise:    Cues were given for technique, safety, recruitment, and rationale. Cues were verbal and/or tactile. Performed muscle pumping activities to circulate fluids and stimulate the lymphatic system. ADL/Self-Care Training: This patient was instructed in strategies to help improve self management of edema and to improve circulation. Training today included recommendations and discussion regarding purposeful elevation, sleep positioning, and general movements to pump muscles for circulatory benefit. Additionally, education was provided regarding compression garments and/or compression bandaging, including wear schedule, donning, removal, and laundering. Objective Findings:    COVID screening questions were asked and patient attested that there had been no contact or symptoms    1 layer of Tubigrip at arrival    Skin looking dry at arrival, still with adherent scaling skin. Most nails are trimmed.   2nd toe still long    LE Right 3/22/21 R 3/24/21 R  3/30/21 4/8/21R 21 R 4/13/21 4/15/21 4/20/21   Met heads 29.1 29.0 28.0 28.7 28.0 28.0 28.8 28.0 27.8 Transmalleolar 34.7 33.8 34.0 34.8 34.0 33.5 35.7 33.5 32.0   10cm 31.8 31.3 30.5 30.3 29.5 29.8 29.3 29.5 27.9   20cm 34.2 33.5 32.5 31.9 31.6 33.0 32.2 32.4 30.8   30cm 44.6 44.2 43.4 43.2 41.5 44.5 42.6 43.2 40.9   40cm 50.5 50.0 48.2 47.0 47.0 47.5 47.3 46.4 45.4   50cm 46.5 43.0 45.0 46.8 45.0 44.0 46.8 46.0 43.5   60cm 57.2 55.0 55.4                 LE Left 3/22/21 L 3/24/21 L 3/30/31 4/8/21 L 4/9/21 L 4/13/21 4/15/21    Met heads 28.8 28.0 27.3 27.6 27.3 27.0 27.0 27.0 27.0   Transmalleolar 36.0 34.0 33.0 32.5 33.0 30.0 34.0 34.1 31.4   10cm 28.8 29.6 29.0 28.5 28.1 28.0 28.8 28.7 27.2   20cm 32.9 32.0 31.5 31.4 30.7 32.0 30.9 31.2 30.0   30cm 46.0 45.2 43.0 44.4 43.5 44.3 42.8 44.0 41.8   40cm 49.4 47.5 46.5 45.9 46.0 45.8 46.0 45.5 44.0   50cm 48.8 44.0 44.0 47.4 46.5 45.5 46.0 46.8 44.0   60cm 55.5 51.0 535             Assessment: Pt demonstrated GOOD tolerance to tx today with good lymph fluid circulation. He had quite a bit of decrease in measurements today. SingleTubigrip donned at departure. Compression garments in process. Tolerance for treatment:  good  Adverse reactions totreatment:  none    Plan:   PLAN  After eval, patient will be seen for 2 times per week for 6 weeks. Measure for garments and order garments by the end of treatment week number 4. Treatment will include pt ed, ther ex, self care/ADL training, manual therapy: manual lymphatic drainage, compression garment measuring and ordering.       GOAL  Due by 4/23/21.  1 [place] with HEP.  2 Compliance with elevation daily. 3 Independent with self massage principles. 4 Acquire and wear correctly fitting compression garments for BLE.  5 Consistent reduction of edema as evidenced by regular girth measures.       [x] Continue per plan of care   [] Alter current plan   [] Hold pending MD visit   [] Discharge    Plan for Next Session:  Consider compression socks mext week  Progress Note Due:    Communication with other providers: Time In:  0902   Time Out: 4672  Timed Code Treatment Minutes: 53  Total Treatment Minutes: 53    Electronically signed by: Elvis Quiros PTA, CLT

## 2021-04-22 ENCOUNTER — HOSPITAL ENCOUNTER (OUTPATIENT)
Dept: PHYSICAL THERAPY | Age: 66
Setting detail: THERAPIES SERIES
Discharge: HOME | End: 2021-04-22
Payer: COMMERCIAL

## 2021-04-22 PROCEDURE — 97140 MANUAL THERAPY 1/> REGIONS: CPT

## 2021-04-22 NOTE — FLOWSHEET NOTE
Physical Therapy Treatment Note   Date:  2021    Patient Name: Keith Mccullough   Diagnosis: BLE lymphedema   :  1955       Treatment Diagnosis:  Same   MRN: [id number]        Restrictions/Precautions: Insurance/Certification information:  [employer]       Visit# / total visits:   Missed visits:   0   POC expiration date:  Due by 21 Plan of care signed (Y/N):  n       Initial Pain level: 0/10  Post Tx Pain Ratin/10   Changes/updates to ambulatory history sheet? Subjective: Pt stated all is going well. Manual Lymphatic Drainage:  MLD was performed today for central clearing and for appropriate extremity drainage. Appropriate regions were elevated during the manual lymphatic drainage. Therapeutic Exercise:    Cues were given for technique, safety, recruitment, and rationale. Cues were verbal and/or tactile. Performed muscle pumping activities to circulate fluids and stimulate the lymphatic system. ADL/Self-Care Training: This patient was instructed in strategies to help improve self management of edema and to improve circulation. Training today included recommendations and discussion regarding purposeful elevation, sleep positioning, and general movements to pump muscles for circulatory benefit. Additionally, education was provided regarding compression garments and/or compression bandaging, including wear schedule, donning, removal, and laundering. Objective Findings:    COVID screening questions were asked and patient attested that there had been no contact or symptoms    2 layer of Tubigrip at arrival    Skin looking dry at arrival, still with adherent scaling skin. Most nails are trimmed.   2nd toe still long      LE Right 3/22/21 R 3/24/21 R  3/30/21 4/8/21R 21 R 4/13/21 4/15/21 4/20/21 4/22/21    Met heads 29.1 29.0 28.0 28.7 28.0 28.0 28.8 28.0 27.8 27.8   Transmalleolar 34.7 33.8 34.0 34.8 34.0 33.5 35.7 33.5 32.0 31.2   10cm 31.8 31.3 30.5 30.3 29.5 29.8 29.3 29.5 27.9 28.0   20cm 34.2 33.5 32.5 31.9 31.6 33.0 32.2 32.4 30.8 31.0   30cm 44.6 44.2 43.4 43.2 41.5 44.5 42.6 43.2 40.9 40.5   40cm 50.5 50.0 48.2 47.0 47.0 47.5 47.3 46.4 45.4 44.5   50cm 46.5 43.0 45.0 46.8 45.0 44.0 46.8 46.0 43.5 42.5   60cm 57.2 55.0 55.4                  LE Left 3/22/21 L 3/24/21 L 3/30/31 4/8/21 L 4/9/21 L 4/13/21 4/15/21 4/20/21 4/22/21   Met heads 28.8 28.0 27.3 27.6 27.3 27.0 27.0 27.0 27.0 26.7   Transmalleolar 36.0 34.0 33.0 32.5 33.0 30.0 34.0 34.1 31.4 30.2   10cm 28.8 29.6 29.0 28.5 28.1 28.0 28.8 28.7 27.2 27.6   20cm 32.9 32.0 31.5 31.4 30.7 32.0 30.9 31.2 30.0 30.4   30cm 46.0 45.2 43.0 44.4 43.5 44.3 42.8 44.0 41.8 42.2   40cm 49.4 47.5 46.5 45.9 46.0 45.8 46.0 45.5 44.0 43.03   50cm 48.8 44.0 44.0 47.4 46.5 45.5 46.0 46.8 44.0 43.5   60cm 55.5 51.0 535              Assessment: Pt demonstrated GOOD tolerance to tx today with good lymph fluid circulation. He had quite a bit of decrease in measurements today. SingleTubigrip donned at departure. Compression garments in process. Tolerance for treatment:  good  Adverse reactions totreatment:  none    Plan:   PLAN  After eval, patient will be seen for 2 times per week for 6 weeks. Measure for garments and order garments by the end of treatment week number 4. Treatment will include pt ed, ther ex, self care/ADL training, manual therapy: manual lymphatic drainage, compression garment measuring and ordering.       GOAL  Due by 4/23/21.  1 [place] with HEP.  2 Compliance with elevation daily. 3 Independent with self massage principles. 4 Acquire and wear correctly fitting compression garments for BLE.  5 Consistent reduction of edema as evidenced by regular girth measures.       [x] Continue per plan of care   [] Alter current plan   [] Hold pending MD visit   [] Discharge    Plan for Next Session:  Consider compression socks mext week  Progress Note Due:    Communication with other providers:      Time In:  0902   Time Out: 5723  Timed Code Treatment Minutes: 53  Total Treatment Minutes: 53    Electronically signed by: Kelsie Pino PTA, CLT

## 2021-04-27 ENCOUNTER — HOSPITAL ENCOUNTER (OUTPATIENT)
Dept: PHYSICAL THERAPY | Age: 66
Setting detail: THERAPIES SERIES
Discharge: HOME | End: 2021-04-27
Payer: COMMERCIAL

## 2021-04-27 NOTE — FLOWSHEET NOTE
Physical Therapy Treatment Note   Date:  2021    Patient Name: Christiano Melara   Diagnosis: BLE lymphedema   :  1955       Treatment Diagnosis:  Same   MRN: [id number]        Restrictions/Precautions: Insurance/Certification information:  [employer]       Visit# / total visits:  10/12 Missed visits:   0   POC expiration date:  Due by 21 Plan of care signed (Y/N):  n       Initial Pain level: 0/10  Post Tx Pain Ratin/10   Changes/updates to ambulatory history sheet? Subjective:  LE feel good. Doing well overall. Manual Lymphatic Drainage:  MLD was performed today for central clearing and for appropriate extremity drainage. Appropriate regions were elevated during the manual lymphatic drainage. Therapeutic Exercise:    Cues were given for technique, safety, recruitment, and rationale. Cues were verbal and/or tactile. Performed muscle pumping activities to circulate fluids and stimulate the lymphatic system. ADL/Self-Care Training: This patient was instructed in strategies to help improve self management of edema and to improve circulation. Training today included recommendations and discussion regarding purposeful elevation, sleep positioning, and general movements to pump muscles for circulatory benefit. Additionally, education was provided regarding compression garments and/or compression bandaging, including wear schedule, donning, removal, and laundering. Size L, 15-20mmHg socks Active wear      Objective Findings:    COVID screening questions were asked and patient attested that there had been no contact or symptoms    Single layer of Tubigrip at arrival    Skin looking dry at arrival, still with adherent scaling skin. Most nails are trimmed. 2nd toe still long  - encouraged pt to try a nail file to slowly file the nail down as it is too thick and angled for clippers.        LE Right 3/22/21 R 3/24/21 R  3/30/21 4/8/21R 21 R 4/13/21 4/15/21 4/20/21 4/22/21     Met heads 29.1 29.0 28.0 28.7 28.0 28.0 28.8 28.0 27.8 27.8    Transmalleolar 34.7 33.8 34.0 34.8 34.0 33.5 35.7 33.5 32.0 31.2    10cm 31.8 31.3 30.5 30.3 29.5 29.8 29.3 29.5 27.9 28.0    20cm 34.2 33.5 32.5 31.9 31.6 33.0 32.2 32.4 30.8 31.0    30cm 44.6 44.2 43.4 43.2 41.5 44.5 42.6 43.2 40.9 40.5    40cm 50.5 50.0 48.2 47.0 47.0 47.5 47.3 46.4 45.4 44.5    50cm 46.5 43.0 45.0 46.8 45.0 44.0 46.8 46.0 43.5 42.5    60cm 57.2 55.0 55.4                   LE Left 3/22/21 L 3/24/21 L 3/30/31 4/8/21 L 4/9/21 L 4/13/21 4/15/21 4/20/21 4/22/21    Met heads 28.8 28.0 27.3 27.6 27.3 27.0 27.0 27.0 27.0 26.7    Transmalleolar 36.0 34.0 33.0 32.5 33.0 30.0 34.0 34.1 31.4 30.2    10cm 28.8 29.6 29.0 28.5 28.1 28.0 28.8 28.7 27.2 27.6    20cm 32.9 32.0 31.5 31.4 30.7 32.0 30.9 31.2 30.0 30.4    30cm 46.0 45.2 43.0 44.4 43.5 44.3 42.8 44.0 41.8 42.2    40cm 49.4 47.5 46.5 45.9 46.0 45.8 46.0 45.5 44.0 43.03    50cm 48.8 44.0 44.0 47.4 46.5 45.5 46.0 46.8 44.0 43.5    60cm 55.5 51.0 535               Assessment: Pt demonstrated GOOD tolerance to tx today with good lymph fluid circulation. Tolerance for treatment:  good  Adverse reactions totreatment:  none    Plan:   PLAN  After eval, patient will be seen for 2 times per week for 6 weeks. Measure for garments and order garments by the end of treatment week number 4. Treatment will include pt ed, ther ex, self care/ADL training, manual therapy: manual lymphatic drainage, compression garment measuring and ordering.       GOAL  Due by 4/23/21.  1 [place] with HEP.  2 Compliance with elevation daily. 3 Independent with self massage principles. 4 Acquire and wear correctly fitting compression garments for BLE.  5 Consistent reduction of edema as evidenced by regular girth measures.       [x] Continue per plan of care   [] Alter current plan   [] Hold pending MD visit   [] Discharge    Plan for Next Session:    Progress Note Due:    Communication with other providers:      Time In:  0900   Time Out: 0950  Timed Code Treatment Minutes: 50  Total Treatment Minutes: 50    Electronically signed by: Enrique Goddard PT

## 2021-04-29 ENCOUNTER — HOSPITAL ENCOUNTER (OUTPATIENT)
Dept: PHYSICAL THERAPY | Age: 66
Setting detail: THERAPIES SERIES
Discharge: HOME | End: 2021-04-29
Payer: COMMERCIAL

## 2021-04-29 PROCEDURE — 97140 MANUAL THERAPY 1/> REGIONS: CPT

## 2021-04-29 NOTE — FLOWSHEET NOTE
Physical Therapy Treatment Note   Date:  2021    Patient Name: Joe Osborn   Diagnosis: BLE lymphedema   :  1955       Treatment Diagnosis:  Same   MRN: [id number]        Restrictions/Precautions: Insurance/Certification information:  [employer]       Visit# / total visits:   Missed visits:   0   POC expiration date:  Due by 21 Plan of care signed (Y/N):  n       Initial Pain level: 0/10  Post Tx Pain Ratin/10   Changes/updates to ambulatory history sheet? Subjective:  Pt stated he loves his new socks. He got new shoes. Manual Lymphatic Drainage:  MLD was performed today for central clearing and for appropriate extremity drainage. Appropriate regions were elevated during the manual lymphatic drainage. Therapeutic Exercise:    Cues were given for technique, safety, recruitment, and rationale. Cues were verbal and/or tactile. Performed muscle pumping activities to circulate fluids and stimulate the lymphatic system. ADL/Self-Care Training: This patient was instructed in strategies to help improve self management of edema and to improve circulation. Training today included recommendations and discussion regarding purposeful elevation, sleep positioning, and general movements to pump muscles for circulatory benefit. Additionally, education was provided regarding compression garments and/or compression bandaging, including wear schedule, donning, removal, and laundering. Size L, 15-20mmHg socks Active wear      Objective Findings:    COVID screening questions were asked and patient attested that there had been no contact or symptoms    Compression socks on at arrival    Skin looking dry at arrival, still with adherent scaling skin. Most nails are trimmed. 2nd toe still long  - encouraged pt to try a nail file to slowly file the nail down as it is too thick and angled for clippers.        LE Right 3/22/21 R 3/24/21 R  3/30/21 4/8/21R 21 R 21 4/15/21 4/20/21 4/22/21    Met heads 29.1 29.0 28.0 28.7 28.0 28.0 28.8 28.0 27.8 27.8   Transmalleolar 34.7 33.8 34.0 34.8 34.0 33.5 35.7 33.5 32.0 31.2   10cm 31.8 31.3 30.5 30.3 29.5 29.8 29.3 29.5 27.9 28.0   20cm 34.2 33.5 32.5 31.9 31.6 33.0 32.2 32.4 30.8 31.0   30cm 44.6 44.2 43.4 43.2 41.5 44.5 42.6 43.2 40.9 40.5   40cm 50.5 50.0 48.2 47.0 47.0 47.5 47.3 46.4 45.4 44.5   50cm 46.5 43.0 45.0 46.8 45.0 44.0 46.8 46.0 43.5 42.5   60cm 57.2 55.0 55.4                  LE Left 3/22/21 L 3/24/21 L 3/30/31 4/8/21 L 4/9/21 L 4/13/21 4/15/21 4/20/21 4/22/21   Met heads 28.8 28.0 27.3 27.6 27.3 27.0 27.0 27.0 27.0 26.7   Transmalleolar 36.0 34.0 33.0 32.5 33.0 30.0 34.0 34.1 31.4 30.2   10cm 28.8 29.6 29.0 28.5 28.1 28.0 28.8 28.7 27.2 27.6   20cm 32.9 32.0 31.5 31.4 30.7 32.0 30.9 31.2 30.0 30.4   30cm 46.0 45.2 43.0 44.4 43.5 44.3 42.8 44.0 41.8 42.2   40cm 49.4 47.5 46.5 45.9 46.0 45.8 46.0 45.5 44.0 43.03   50cm 48.8 44.0 44.0 47.4 46.5 45.5 46.0 46.8 44.0 43.5   60cm 55.5 51.0 535              Assessment: Pt demonstrated GOOD tolerance to tx today with good lymph fluid circulation. Pt was re-educated on the frequency compression garment us and if he needs a 2nd pair to reach out. Tolerance for treatment:  good  Adverse reactions totreatment:  none    Plan:   PLAN  After eval, patient will be seen for 2 times per week for 6 weeks. Measure for garments and order garments by the end of treatment week number 4. Treatment will include pt ed, ther ex, self care/ADL training, manual therapy: manual lymphatic drainage, compression garment measuring and ordering.       GOAL  Due by 4/23/21.  1 [place] with HEP.  2 Compliance with elevation daily. 3 Independent with self massage principles. 4 Acquire and wear correctly fitting compression garments for BLE.  5 Consistent reduction of edema as evidenced by regular girth measures.       [x] Continue per plan of care   [] Alter current plan   [] Hold pending MD visit   [] Discharge    Plan for Next Session:  D/c today  Progress Note Due:    Communication with other providers:      Time In:  0900   Time Out: 7614  Timed Code Treatment Minutes: 44  Total Treatment Minutes: 44    Electronically signed by: Yuni Pearlta PTA, CLT 4/29/21

## 2021-05-24 ENCOUNTER — HOSPITAL ENCOUNTER (OUTPATIENT)
Age: 66
Discharge: HOME | End: 2021-05-24
Payer: COMMERCIAL

## 2021-05-24 LAB
BUN BLDV-MCNC: 15 MG/DL (ref 6–23)
CREAT SERPL-MCNC: 0.9 MG/DL (ref 0.9–1.3)
DOSE AMOUNT: NORMAL
DOSE TIME: NORMAL
GFR AFRICAN AMERICAN: >60 ML/MIN/1.73M2
GFR NON-AFRICAN AMERICAN: >60 ML/MIN/1.73M2
LITHIUM LEVEL: 0.5 MMOL/L (ref 0.6–1.2)
VALPROIC ACID LEVEL: 62.8 UG/ML (ref 50–100)

## 2021-05-24 PROCEDURE — 36415 COLL VENOUS BLD VENIPUNCTURE: CPT

## 2021-05-24 PROCEDURE — 80164 ASSAY DIPROPYLACETIC ACD TOT: CPT

## 2021-05-24 PROCEDURE — 84520 ASSAY OF UREA NITROGEN: CPT

## 2021-05-24 PROCEDURE — 82565 ASSAY OF CREATININE: CPT

## 2021-05-24 PROCEDURE — 80178 ASSAY OF LITHIUM: CPT

## 2021-05-25 NOTE — PROGRESS NOTES
Outpatient Physical Therapy        [x] Phone: [phone]   Fax: [phone]   Physician: Dr. Naseem Reed      From: Marialuisa Herman, PT, PT     Patient: Josh Hall                  : 1955  Diagnosis:  BLE lymphedema     Date: 2021  Treatment Diagnosis:  BLE lymphedema    []  Progress Note                [x]  Discharge Note    Total Visits to date:  12 Cancels/No-shows to date:  0    Subjective:  Last treated 21. Pt has not returned. At last appointment:  Pt stated he loves his new socks. He got new shoes. 0/10 pain. Plan of Care/Treatment to date:  [x] Therapeutic Exercise    [] Modalities:  [x] Therapeutic Activity     [] Ultrasound  [] Electric Stimulation  [] Gait Training      [] Cervical Traction    [] Lumbar Traction  [] Neuromuscular Re-education  [] Cold/hotpack [] Iontophoresis  [x] Instruction in HEP      Other:  [x] Manual Therapy       []  Vasopneumatic  [x] Self care management                           [] Dry needling trigger point/pain management                ? Objective/Significant Findings At Last Visit/Comments:    1 [place] with HEP. MET  2 Compliance with elevation daily. MET  3 Independent with self massage principles. MET  4 Acquire and wear correctly fitting compression garments for BLE. MET : Size L, 15-20mmHg socks Active wear  5 Consistent reduction of edema as evidenced by regular girth measures. MET          Patient Status: [] Continue per initial plan of Care     [x] Patient now discharged     [] Additional visits requested, Please re-certify for additional visits:    Electronically signed by:  Marialuisa Herman PT, PT, 2021, 9:25 AM    If you have any questions or concerns, please don't hesitate to call.   Thank you for your referral.

## 2021-07-06 ENCOUNTER — HOSPITAL ENCOUNTER (OUTPATIENT)
Age: 66
Discharge: HOME | End: 2021-07-06
Payer: COMMERCIAL

## 2021-07-06 LAB
CHOLESTEROL: 144 MG/DL
ESTIMATED AVERAGE GLUCOSE: 108 MG/DL
HBA1C MFR BLD: 5.4 % (ref 4.2–6.3)
HDLC SERPL-MCNC: 76 MG/DL
LDL CHOLESTEROL DIRECT: 50 MG/DL
LITHIUM LEVEL: 0.4 MMOL/L (ref 0.6–1.2)
TRIGL SERPL-MCNC: 80 MG/DL
VALPROIC ACID LEVEL: 21.4 UG/ML (ref 50–100)

## 2021-07-06 PROCEDURE — 83721 ASSAY OF BLOOD LIPOPROTEIN: CPT

## 2021-07-06 PROCEDURE — 83036 HEMOGLOBIN GLYCOSYLATED A1C: CPT

## 2021-07-06 PROCEDURE — 80178 ASSAY OF LITHIUM: CPT

## 2021-07-06 PROCEDURE — 80164 ASSAY DIPROPYLACETIC ACD TOT: CPT

## 2021-07-06 PROCEDURE — 36415 COLL VENOUS BLD VENIPUNCTURE: CPT

## 2021-07-06 PROCEDURE — 80061 LIPID PANEL: CPT

## 2021-07-21 ENCOUNTER — HOSPITAL ENCOUNTER (EMERGENCY)
Age: 66
Discharge: HOME | End: 2021-07-21
Attending: EMERGENCY MEDICINE
Payer: COMMERCIAL

## 2021-07-21 ENCOUNTER — APPOINTMENT (OUTPATIENT)
Dept: CT IMAGING | Age: 66
End: 2021-07-21
Payer: COMMERCIAL

## 2021-07-21 VITALS
HEART RATE: 68 BPM | OXYGEN SATURATION: 98 % | TEMPERATURE: 98.1 F | SYSTOLIC BLOOD PRESSURE: 122 MMHG | BODY MASS INDEX: 37.22 KG/M2 | RESPIRATION RATE: 16 BRPM | DIASTOLIC BLOOD PRESSURE: 70 MMHG | WEIGHT: 260 LBS | HEIGHT: 70 IN

## 2021-07-21 DIAGNOSIS — R31.9 HEMATURIA, UNSPECIFIED TYPE: Primary | ICD-10-CM

## 2021-07-21 LAB
ALBUMIN SERPL-MCNC: 3.9 GM/DL (ref 3.4–5)
ALP BLD-CCNC: 78 IU/L (ref 40–129)
ALT SERPL-CCNC: 18 U/L (ref 10–40)
ANION GAP SERPL CALCULATED.3IONS-SCNC: 10 MMOL/L (ref 4–16)
AST SERPL-CCNC: 20 IU/L (ref 15–37)
BACTERIA: ABNORMAL /HPF
BASOPHILS ABSOLUTE: 0.1 K/CU MM
BASOPHILS RELATIVE PERCENT: 0.6 % (ref 0–1)
BILIRUB SERPL-MCNC: 0.3 MG/DL (ref 0–1)
BILIRUBIN URINE: NEGATIVE MG/DL
BLOOD, URINE: ABNORMAL
BUN BLDV-MCNC: 12 MG/DL (ref 6–23)
CALCIUM SERPL-MCNC: 8.7 MG/DL (ref 8.3–10.6)
CHLORIDE BLD-SCNC: 100 MMOL/L (ref 99–110)
CLARITY: CLEAR
CO2: 25 MMOL/L (ref 21–32)
COLOR: YELLOW
CREAT SERPL-MCNC: 1 MG/DL (ref 0.9–1.3)
DIFFERENTIAL TYPE: ABNORMAL
EOSINOPHILS ABSOLUTE: 0.3 K/CU MM
EOSINOPHILS RELATIVE PERCENT: 3.7 % (ref 0–3)
GFR AFRICAN AMERICAN: >60 ML/MIN/1.73M2
GFR NON-AFRICAN AMERICAN: >60 ML/MIN/1.73M2
GLUCOSE BLD-MCNC: 79 MG/DL (ref 70–99)
GLUCOSE, URINE: NEGATIVE MG/DL
HCT VFR BLD CALC: 40.2 % (ref 42–52)
HEMOGLOBIN: 13.3 GM/DL (ref 13.5–18)
IMMATURE NEUTROPHIL %: 0.4 % (ref 0–0.43)
KETONES, URINE: NEGATIVE MG/DL
LEUKOCYTE ESTERASE, URINE: NEGATIVE
LYMPHOCYTES ABSOLUTE: 1.2 K/CU MM
LYMPHOCYTES RELATIVE PERCENT: 13.5 % (ref 24–44)
MCH RBC QN AUTO: 31.7 PG (ref 27–31)
MCHC RBC AUTO-ENTMCNC: 33.1 % (ref 32–36)
MCV RBC AUTO: 95.9 FL (ref 78–100)
MONOCYTES ABSOLUTE: 0.6 K/CU MM
MONOCYTES RELATIVE PERCENT: 6.8 % (ref 0–4)
NITRITE URINE, QUANTITATIVE: NEGATIVE
NUCLEATED RBC %: 0 %
PDW BLD-RTO: 13.9 % (ref 11.7–14.9)
PH, URINE: 8 (ref 5–8)
PLATELET # BLD: 169 K/CU MM (ref 140–440)
PMV BLD AUTO: 9.5 FL (ref 7.5–11.1)
POTASSIUM SERPL-SCNC: 4.4 MMOL/L (ref 3.5–5.1)
PROTEIN UA: NEGATIVE MG/DL
RBC # BLD: 4.19 M/CU MM (ref 4.6–6.2)
RBC URINE: 5 /HPF (ref 0–3)
SEGMENTED NEUTROPHILS ABSOLUTE COUNT: 6.7 K/CU MM
SEGMENTED NEUTROPHILS RELATIVE PERCENT: 75 % (ref 36–66)
SODIUM BLD-SCNC: 135 MMOL/L (ref 135–145)
SPECIFIC GRAVITY UA: 1 (ref 1–1.03)
SQUAMOUS EPITHELIAL: <1 /HPF
TOTAL IMMATURE NEUTOROPHIL: 0.04 K/CU MM
TOTAL NUCLEATED RBC: 0 K/CU MM
TOTAL PROTEIN: 6.3 GM/DL (ref 6.4–8.2)
TRICHOMONAS: ABNORMAL /HPF
UROBILINOGEN, URINE: NEGATIVE MG/DL (ref 0.2–1)
WBC # BLD: 8.9 K/CU MM (ref 4–10.5)
WBC UA: 2 /HPF (ref 0–2)

## 2021-07-21 PROCEDURE — 74176 CT ABD & PELVIS W/O CONTRAST: CPT

## 2021-07-21 PROCEDURE — 85025 COMPLETE CBC W/AUTO DIFF WBC: CPT

## 2021-07-21 PROCEDURE — 80053 COMPREHEN METABOLIC PANEL: CPT

## 2021-07-21 PROCEDURE — 87086 URINE CULTURE/COLONY COUNT: CPT

## 2021-07-21 PROCEDURE — 81001 URINALYSIS AUTO W/SCOPE: CPT

## 2021-07-21 PROCEDURE — 99283 EMERGENCY DEPT VISIT LOW MDM: CPT

## 2021-07-21 ASSESSMENT — ENCOUNTER SYMPTOMS
COUGH: 0
BACK PAIN: 0
RHINORRHEA: 0
VOMITING: 0
ABDOMINAL PAIN: 0
SORE THROAT: 0
EYE DISCHARGE: 0
NAUSEA: 0
EYE PAIN: 0
SHORTNESS OF BREATH: 0

## 2021-07-21 NOTE — ED PROVIDER NOTES
[address] ENCOUNTER      Pt Name: Reuben Godinez  MRN: [id number]  [place] 1955  Date of evaluation: 7/21/2021  Provider: Girma Craig MD    CHIEF COMPLAINT       Chief Complaint   Patient presents with    Hematuria     blood in urine for an hour after a strenuous bike ride, states fell onto bike but unsure if having any injury to groin         HISTORY OF PRESENT ILLNESS      Reuben Godinez is a 77 y.o. male who presents to the emergency department  for   Chief Complaint   Patient presents with    Hematuria     blood in urine for an hour after a strenuous bike ride, states fell onto bike but unsure if having any injury to groin       77 yom presents reporting what he believes to been hematuria. He states he went for strenuous bike ride earlier today and then developed he believes was blood in his urine. Denies history of prior similar episodes. He is unremarkable dysuria. Denies any fever chills or other constitutional infectious symptoms. Denies any injury or trauma to his perineum or genitalia. No abdominal pain. No respiratory symptoms. He is on Eliquis and baby aspirin. Denies any other history of bleeding issues. Presents the emergency department GCS of 15. He is moving extremity spontaneously. He does state that he is urinated a couple times since and the urine appears to be lighter in color at this time. GCS of 15. He does not identify any exacerbating leading factors. Nursing Notes, Triage Notes & Vital Signs were reviewed. REVIEW OF SYSTEMS    (2-9 systems for level 4, 10 or more for level 5)     Review of Systems   Constitutional: Negative for chills and fever. HENT: Negative for congestion, rhinorrhea and sore throat. Eyes: Negative for pain and discharge. Respiratory: Negative for cough and shortness of breath.     Cardiovascular: Negative for chest pain and palpitations. Gastrointestinal: Negative for abdominal pain, nausea and vomiting. Endocrine: Negative for polydipsia and polyuria. Genitourinary: Positive for hematuria. Negative for dysuria and flank pain. Musculoskeletal: Negative for back pain and neck pain. Skin: Negative for pallor and wound. Neurological: Negative for dizziness, seizures, facial asymmetry, light-headedness, numbness and headaches. Psychiatric/Behavioral: Negative for confusion. Except as noted above the remainder of the review of systems was reviewed and negative. PAST MEDICAL HISTORY     Past Medical History:   Diagnosis Date    Arthritis     Bipolar 1 disorder ([facility]     COPD (chronic obstructive pulmonary disease) ([facility]     \"use to have copd but is better\"    Hx of blood clots     \"had blood clot in lung and in my legs- that was in 2016- on Eliquis\"    Hyperlipidemia     Hypertension     Neuropathy     Schizo affective schizophrenia ([facility]     \"follow with Dr Efrain Lugo at [address] [place] [address] of nervous system     WD-Arterial insufficiency of lower extremity ([facility] 2/4/2021    WD-Cellulitis of left lower leg 2/4/2021    WD-Venous insufficiency of both lower extremities 2/4/2021    WDCellulitis of leg, right 2/4/2021       Prior to Admission medications    Medication Sig Start Date End Date Taking? Authorizing Provider   clotrimazole-betamethasone (LOTRISONE) 1-0.05 % cream Apply topically 2 times daily. 2/11/21   Hettie Schlatter, MD   clotrimazole-betamethasone (LOTRISONE) 1-0.05 % cream Apply topically 2 times daily.  2/4/21   Hettie Schlatter, MD   risperiDONE (RISPERDAL) 3 MG tablet Take 3 mg by mouth nightly    Historical Provider, MD   aspirin 81 MG chewable tablet Take 1 tablet by mouth daily 7/26/16   C Alfredo Kahn MD   [facility]) 5 MG TABS tablet Take 1 tablet by mouth 2 times daily 7/26/16   C Alfredo Kahn MD   atorvastatin (LIPITOR) 40 MG tablet Take 1 tablet by mouth nightly 7/26/16   C Ivan Arteaga MD   carvedilol (COREG) 3.125 MG tablet Take 1 tablet by mouth 2 times daily (with meals) 7/26/16   C Ivan Arteaga MD   potassium chloride (K-DUR) 10 MEQ tablet Take 1 tablet by mouth daily 7/26/16   C Ivan Arteaga MD   divalproex (DEPAKOTE) 500 MG DR tablet Take 500 mg by mouth 2 times daily    Historical Provider, MD   lithium 300 MG tablet Take 300 mg by mouth 2 times daily    Historical Provider, MD   furosemide (LASIX) 40 MG tablet   Take 40 mg by mouth 2 times daily Pt takes 40 in the am and 20 at noon    Historical Provider, MD        Patient Active Problem List   Diagnosis    SOB (shortness of breath) on exertion    Acute pulmonary embolism (HCC)    Gait disturbance    Dizzy    Morbid obesity ([address]    Bipolar disorder ([address]    History of DVT of lower extremity    General weakness    Abnormal EKG    Exertional dyspnea    Sinus bradycardia    Pulmonary embolism without acute cor pulmonale ([address]    Morbid obesity due to excess calories (HCC)    CIDP (chronic inflammatory demyelinating polyneuropathy) (HCC)    Bipolar disorder, current episode mixed, moderate (HCC)    WD-Cellulitis of left lower leg    WDCellulitis of leg, right    WD-Arterial insufficiency of lower extremity ([address]    WD-Venous insufficiency of both lower extremities    WD-Lymphedema of both lower extremities         SURGICAL HISTORY       Past Surgical History:   Procedure Laterality Date    COLONOSCOPY  last one 2012    DENTAL SURGERY      \"wisdom teeth put to sleep- yrs ago\"    FRACTURE SURGERY  age 22    fx left fibia and tibia- hardware later removed    HYDROCELE EXCISION  10+ yrs ago    IR IVC FILTER PLACEMENT W IMAGING N/A 07/14/2016    [employer] filter lot#[id number], exp - Dr Malissa Severin  2012    L knee    UPPER GASTROINTESTINAL ENDOSCOPY  04/12/2018         CURRENT MEDICATIONS       Previous Medications    APIXABAN (ELIQUIS) 5 MG TABS TABLET    Take 1 tablet by mouth 2 times daily    ASPIRIN 81 MG CHEWABLE TABLET    Take 1 tablet by mouth daily    ATORVASTATIN (LIPITOR) 40 MG TABLET    Take 1 tablet by mouth nightly    CARVEDILOL (COREG) 3.125 MG TABLET    Take 1 tablet by mouth 2 times daily (with meals)    CLOTRIMAZOLE-BETAMETHASONE (LOTRISONE) 1-0.05 % CREAM    Apply topically 2 times daily. CLOTRIMAZOLE-BETAMETHASONE (LOTRISONE) 1-0.05 % CREAM    Apply topically 2 times daily.     DIVALPROEX (DEPAKOTE) 500 MG DR TABLET    Take 500 mg by mouth 2 times daily    FUROSEMIDE (LASIX) 40 MG TABLET      Take 40 mg by mouth 2 times daily Pt takes 40 in the am and 20 at noon    LITHIUM 300 MG TABLET    Take 300 mg by mouth 2 times daily    POTASSIUM CHLORIDE (K-DUR) 10 MEQ TABLET    Take 1 tablet by mouth daily    RISPERIDONE (RISPERDAL) 3 MG TABLET    Take 3 mg by mouth nightly       ALLERGIES     Thorazine [chlorpromazine]    FAMILY HISTORY       Family History   Problem Relation Age of Onset    Cancer Mother         breast ca age 39, then later had someother form of cancer?unsure what it was    Heart Disease Father     Stroke Father     Cancer Sister     No Known Problems Brother           SOCIAL HISTORY       Social History     Socioeconomic History    Marital status: Single     Spouse name: None    Number of children: None    Years of education: None    Highest education level: None   Occupational History    None   Tobacco Use    Smoking status: Never Smoker    Smokeless tobacco: Never Used   Vaping Use    Vaping Use: Never used   Substance and Sexual Activity    Alcohol use: No     Comment: stated has not had a drink in a year/\"quit drinking  30 yrs ago- use to drink on weekends- 4-5 beers\"    Drug use: Not Currently     Types: Marijuana     Comment: \"last used over 30 yrs ago\"    Sexual activity: None   Other Topics Concern    None   Social History Narrative    None     Social Determinants of Health     Financial Resource Strain:     Difficulty of Paying Living Expenses:    Food Insecurity:     Worried About [facility] in the Last Year:     [address] in the Last Year:    Transportation Needs:     Lack of Transportation (Medical):  Lack of Transportation (Non-Medical):    Physical Activity:     Days of Exercise per Week:     Minutes of Exercise per Session:    Stress:     Feeling of Stress :    Social Connections:     Frequency of Communication with Friends and Family:     Frequency of Social Gatherings with Friends and Family:     Attends [employer] Services:     Active Member of Clubs or Organizations:     Attends Club or Organization Meetings:     Marital Status:    Intimate Partner Violence:     Fear of Current or Ex-Partner:     Emotionally Abused:     Physically Abused:     Sexually Abused:        SCREENINGS               PHYSICAL EXAM    (up to 7 for level 4, 8 or more for level 5)     ED Triage Vitals [07/21/21 0937]   BP Temp Temp src Pulse Resp SpO2 Height Weight   129/64 99 °F (37.2 °C) -- 71 14 97 % 5' 10\" (1.778 m) 260 lb (117.9 kg)       Physical Exam  Vitals reviewed. Constitutional:       Appearance: He is not ill-appearing or toxic-appearing. HENT:      Head: Normocephalic and atraumatic. Nose: No congestion or rhinorrhea. Mouth/Throat:      Mouth: Mucous membranes are moist.      Pharynx: No oropharyngeal exudate. Eyes:      General:         Right eye: No discharge. Left eye: No discharge. Extraocular Movements: Extraocular movements intact. Pupils: Pupils are equal, round, and reactive to light. Cardiovascular:      Rate and Rhythm: Normal rate. Heart sounds: No friction rub. No gallop. Pulmonary:      Effort: Pulmonary effort is normal. No respiratory distress. Chest:      Chest wall: No tenderness. Abdominal:      Palpations: Abdomen is soft. Tenderness: There is no abdominal tenderness. There is no guarding. Musculoskeletal:         General: No tenderness. Normal range of motion. Cervical back: Normal range of motion and neck supple. No rigidity or tenderness. Right lower leg: No edema. Left lower leg: No edema. Skin:     General: Skin is warm. Capillary Refill: Capillary refill takes less than 2 seconds. Findings: No erythema or lesion. Neurological:      General: No focal deficit present. Mental Status: He is alert. Mental status is at baseline. DIAGNOSTIC RESULTS     Labs Reviewed   CBC WITH AUTO DIFFERENTIAL - Abnormal; Notable for the following components:       Result Value    RBC 4.19 (*)     Hemoglobin 13.3 (*)     Hematocrit 40.2 (*)     MCH 31.7 (*)     Segs Relative 75.0 (*)     Lymphocytes % 13.5 (*)     Monocytes % 6.8 (*)     Eosinophils % 3.7 (*)     All other components within normal limits   COMPREHENSIVE METABOLIC PANEL - Abnormal; Notable for the following components: Total Protein 6.3 (*)     All other components within normal limits   URINALYSIS - Abnormal; Notable for the following components:    Blood, Urine LARGE (*)     RBC, UA 5 (*)     Bacteria, UA OCCASIONAL (*)     All other components within normal limits   CULTURE, URINE          RADIOLOGY:     Non-plain film images such as CT, Ultrasound and MRI are read by the radiologist. Plain radiographic images are visualized and preliminarily interpreted by the emergency physician. Interpretation per the Radiologist below, if available at the time of this note:    CT ABDOMEN PELVIS WO CONTRAST Additional Contrast? None   Final Result   No renal mass, hydronephrosis, renal or ureteral calculi. No diverticulitis or appendicitis. Rectal wall thickening was noted compatible with proctitis. Mild urinary bladder wall thickening was noted. Does the patient have any   symptoms for cystitis? Acquired spinal stenosis at the L3-4 disc. Inferior vena cava filter.                ED BEDSIDE ULTRASOUND:   Performed by ED Physician Alicia Rain Dimitri Coyle MD       LABS:  Labs Reviewed   CBC WITH AUTO DIFFERENTIAL - Abnormal; Notable for the following components:       Result Value    RBC 4.19 (*)     Hemoglobin 13.3 (*)     Hematocrit 40.2 (*)     MCH 31.7 (*)     Segs Relative 75.0 (*)     Lymphocytes % 13.5 (*)     Monocytes % 6.8 (*)     Eosinophils % 3.7 (*)     All other components within normal limits   COMPREHENSIVE METABOLIC PANEL - Abnormal; Notable for the following components: Total Protein 6.3 (*)     All other components within normal limits   URINALYSIS - Abnormal; Notable for the following components:    Blood, Urine LARGE (*)     RBC, UA 5 (*)     Bacteria, UA OCCASIONAL (*)     All other components within normal limits   CULTURE, URINE       All other labs were within normal range or not returned as of this dictation. EMERGENCY DEPARTMENT COURSE and DIFFERENTIAL DIAGNOSIS/MDM:   Vitals:    Vitals:    07/21/21 0937   BP: 129/64   Pulse: 71   Resp: 14   Temp: 99 °F (37.2 °C)   SpO2: 97%   Weight: 260 lb (117.9 kg)   Height: 5' 10\" (1.778 m)           MDM  Number of Diagnoses or Management Options  Hematuria, unspecified type  Diagnosis management comments: 66-year-old male presents with reported hematuria. States he had a strenuous bike ride earlier today and then noticed what appeared to be blood in his urine. Denies prior similar episodes. Denies any trauma to his genitals or perineum. He presents with unremarkable vitals. He is on Eliquis. He reports that he has urinated several times since being in the emergency department the urine is cleared. Labs, urinalysis and abdominal CT scan are obtained. Abdominal pelvic CT scan is overall nonacute. Does endorse some possible mild bladder wall thickening. However is not having dysuria or other remarkable symptoms of cystitis. His serum creatinine is unremarkable at 1. His urinalysis does show blood but no other acute findings peer results are discussed with patient.   At this time he states he is symptom-free. His urine is cleared. He will be given urology for follow-up. Return precautions for worsening concerning symptoms are discussed. He is discharged home in stable condition.      -  Patient seen and evaluated in the emergency department. -  Triage and nursing notes reviewed and incorporated. -  Old chart records reviewed and incorporated. -  Work-up included:  See above  -  Results discussed with patient. CONSULTS:  None    PROCEDURES:  None performed unless otherwise noted below     Procedures        FINAL IMPRESSION      1. Hematuria, unspecified type          DISPOSITION/PLAN   DISPOSITION Decision To Discharge 07/21/2021 01:35:51 PM      PATIENT REFERRED TO:  Faustina Bonilla, APRN - CNP   [address]   [phone]    Schedule an appointment as soon as possible for a visit in 1 day      [facility] [address]  [phone]      As needed, Please follow-up with urology as needed      DISCHARGE MEDICATIONS:  New Prescriptions    No medications on file       ED Provider Disposition Time  DISPOSITION Decision To Discharge 07/21/2021 01:35:51 PM      Appropriate personal protective equipment was worn during the patient's evaluation. These included surgical, eye protection, surgical mask or in 95 respirator and gloves. The patient was also placed in a surgical mask for the prevention of possible spread of respiratory viral illnesses. The Patient was instructed to read the package inserts with any medication that was prescribed. Major potential reactions and medication interactions were discussed. The Patient understands that there are numerous possible adverse reactions not covered. The patient was also instructed to arrange follow-up with his or her primary care provider for review of any pending labwork or incidental findings on any radiology results that were obtained.   All efforts were made to discuss any incidental findings that require further monitoring. Controlled Substances Monitoring:     No flowsheet data found.     (Please note that portions of this note were completed with a voice recognition program.  Efforts were made to edit the dictations but occasionally words are mis-transcribed.)    Priya Pate MD (electronically signed)  Attending Emergency Physician           Priya Pate MD  07/21/21 4756

## 2021-07-21 NOTE — ED NOTES
Patient discharged to home at this time. Discharge instructions and follow up care discussed, patient voices understanding.       Matthew Sanderson, RN  07/21/21 2503

## 2021-07-22 LAB
CULTURE: NORMAL
Lab: NORMAL
SPECIMEN: NORMAL

## 2021-08-16 ENCOUNTER — HOSPITAL ENCOUNTER (OUTPATIENT)
Age: 66
Discharge: HOME | End: 2021-08-16
Payer: COMMERCIAL

## 2021-08-16 LAB
CHOLESTEROL: 131 MG/DL
DOSE AMOUNT: NORMAL
DOSE TIME: NORMAL
ESTIMATED AVERAGE GLUCOSE: 97 MG/DL
GLUCOSE FASTING: 88 MG/DL (ref 70–99)
HBA1C MFR BLD: 5 % (ref 4.2–6.3)
HDLC SERPL-MCNC: 66 MG/DL
LDL CHOLESTEROL DIRECT: 42 MG/DL
LITHIUM LEVEL: 0.6 MMOL/L (ref 0.6–1.2)
TRIGL SERPL-MCNC: 94 MG/DL
TSH HIGH SENSITIVITY: 1.18 UIU/ML (ref 0.27–4.2)
VALPROIC ACID LEVEL: 60.9 UG/ML (ref 50–100)

## 2021-08-16 PROCEDURE — 82947 ASSAY GLUCOSE BLOOD QUANT: CPT

## 2021-08-16 PROCEDURE — 80061 LIPID PANEL: CPT

## 2021-08-16 PROCEDURE — 83036 HEMOGLOBIN GLYCOSYLATED A1C: CPT

## 2021-08-16 PROCEDURE — 84443 ASSAY THYROID STIM HORMONE: CPT

## 2021-08-16 PROCEDURE — 80164 ASSAY DIPROPYLACETIC ACD TOT: CPT

## 2021-08-16 PROCEDURE — 83721 ASSAY OF BLOOD LIPOPROTEIN: CPT

## 2021-08-16 PROCEDURE — 36415 COLL VENOUS BLD VENIPUNCTURE: CPT

## 2021-08-16 PROCEDURE — 80178 ASSAY OF LITHIUM: CPT

## 2021-10-19 ENCOUNTER — HOSPITAL ENCOUNTER (OUTPATIENT)
Dept: SLEEP CENTER | Age: 66
Discharge: HOME | End: 2021-10-19
Payer: COMMERCIAL

## 2021-10-19 VITALS
DIASTOLIC BLOOD PRESSURE: 85 MMHG | WEIGHT: 270 LBS | SYSTOLIC BLOOD PRESSURE: 135 MMHG | BODY MASS INDEX: 38.65 KG/M2 | HEART RATE: 56 BPM | HEIGHT: 70 IN

## 2021-10-19 DIAGNOSIS — G47.33 OSA (OBSTRUCTIVE SLEEP APNEA): Primary | ICD-10-CM

## 2021-10-19 PROCEDURE — 99211 OFF/OP EST MAY X REQ PHY/QHP: CPT

## 2021-10-19 ASSESSMENT — SLEEP AND FATIGUE QUESTIONNAIRES
HOW LIKELY ARE YOU TO NOD OFF OR FALL ASLEEP WHILE SITTING QUIETLY AFTER LUNCH WITHOUT ALCOHOL: 0
HOW LIKELY ARE YOU TO NOD OFF OR FALL ASLEEP WHILE LYING DOWN TO REST IN THE AFTERNOON WHEN CIRCUMSTANCES PERMIT: 3
HOW LIKELY ARE YOU TO NOD OFF OR FALL ASLEEP WHILE WATCHING TV: 2
HOW LIKELY ARE YOU TO NOD OFF OR FALL ASLEEP WHILE SITTING AND READING: 1
HOW LIKELY ARE YOU TO NOD OFF OR FALL ASLEEP WHEN YOU ARE A PASSENGER IN A CAR FOR AN HOUR WITHOUT A BREAK: 1
HOW LIKELY ARE YOU TO NOD OFF OR FALL ASLEEP IN A CAR, WHILE STOPPED FOR A FEW MINUTES IN TRAFFIC: 0
HOW LIKELY ARE YOU TO NOD OFF OR FALL ASLEEP WHILE SITTING AND TALKING TO SOMEONE: 0
HOW LIKELY ARE YOU TO NOD OFF OR FALL ASLEEP WHILE SITTING INACTIVE IN A PUBLIC PLACE: 0
ESS TOTAL SCORE: 7

## 2021-10-19 NOTE — PROGRESS NOTES
Branson Fabry MD, Reggie Dockery MD, Talib Acuña MD, Julio Perera MD, [facility]      [address], [address] [facility]   PH: [phone]  F: [phone]     Subjective:     Patient ID: Juvencio Buckley is a 77 y.o. male, referred to the sleep center for   Chief Complaint   Patient presents with    Sleep Apnea        .77year old male with snoring. he feels tired and fatigued all day. He has EDS. he has weight gain over the years. about 70 lbs in past seven years    Referring physician: ez hunter     History:    Social History     Socioeconomic History    Marital status: Single     Spouse name: Not on file    Number of children: Not on file    Years of education: Not on file    Highest education level: Not on file   Occupational History    Not on file   Tobacco Use    Smoking status: Never Smoker    Smokeless tobacco: Never Used   Vaping Use    Vaping Use: Never used   Substance and Sexual Activity    Alcohol use: No     Comment: stated has not had a drink in a year/\"quit drinking  30 yrs ago- use to drink on weekends- 4-5 beers\"    Drug use: Not Currently     Types: Marijuana     Comment: \"last used over 30 yrs ago\"    Sexual activity: Not on file   Other Topics Concern    Not on file   Social History Narrative    Not on file     Social Determinants of Health     Financial Resource Strain:     Difficulty of Paying Living Expenses:    Food Insecurity:     Worried About Running Out of Food in the Last Year:     [address] in the Last Year:    Transportation Needs:     Lack of Transportation (Medical):      Lack of Transportation (Non-Medical):    Physical Activity:     Days of Exercise per Week:     Minutes of Exercise per Session:    Stress:     Feeling of Stress :    Social Connections:     Frequency of Communication with Friends and Family:     Frequency of Social Gatherings with Friends and Family:     Attends Moravian Services:     Active Member of Clubs or Organizations:     Attends Club or Organization Meetings:     Marital Status:    Intimate Partner Violence:     Fear of Current or Ex-Partner:     Emotionally Abused:     Physically Abused:     Sexually Abused:        Prior to Admission medications    Medication Sig Start Date End Date Taking? Authorizing Provider   clotrimazole-betamethasone (LOTRISONE) 1-0.05 % cream Apply topically 2 times daily. 2/11/21  Yes Max Bowling MD   clotrimazole-betamethasone (LOTRISONE) 1-0.05 % cream Apply topically 2 times daily.  2/4/21  Yes Max Bowling MD   risperiDONE (RISPERDAL) 3 MG tablet Take 3 mg by mouth nightly   Yes Historical Provider, MD   aspirin 81 MG chewable tablet Take 1 tablet by mouth daily 7/26/16  Yes GINNY Cardozo MD   apixaban (ELIQUIS) 5 MG TABS tablet Take 1 tablet by mouth 2 times daily 7/26/16  Yes Joao Sosa MD   atorvastatin (LIPITOR) 40 MG tablet Take 1 tablet by mouth nightly 7/26/16  Yes GINNY Cardozo MD   carvedilol (COREG) 3.125 MG tablet Take 1 tablet by mouth 2 times daily (with meals) 7/26/16  Yes GINNY Cardozo MD   potassium chloride (K-DUR) 10 MEQ tablet Take 1 tablet by mouth daily 7/26/16  Yes Joao Sosa MD   divalproex (DEPAKOTE) 500 MG DR tablet Take 500 mg by mouth 2 times daily   Yes Historical Provider, MD   lithium 300 MG tablet Take 300 mg by mouth 2 times daily   Yes Historical Provider, MD   furosemide (LASIX) 40 MG tablet   Take 40 mg by mouth 2 times daily Pt takes 40 in the am and 20 at noon   Yes Historical Provider, MD       Allergies as of 10/19/2021 - Fully Reviewed 10/19/2021   Allergen Reaction Noted    Thorazine [chlorpromazine] Other (See Comments) 06/07/2017       Patient Active Problem List   Diagnosis    SOB (shortness of breath) on exertion    Acute pulmonary embolism ([address]    Gait disturbance    Dizzy    Morbid obesity ([address]    Bipolar disorder ([facility]    History of DVT of lower extremity    General weakness    Abnormal EKG    Exertional dyspnea    Sinus bradycardia    Pulmonary embolism without acute cor pulmonale (HCC)    Morbid obesity due to excess calories (HCC)    CIDP (chronic inflammatory demyelinating polyneuropathy) (HCC)    Bipolar disorder, current episode mixed, moderate (HCC)    WD-Cellulitis of left lower leg    WDCellulitis of leg, right    WD-Arterial insufficiency of lower extremity (HCC)    WD-Venous insufficiency of both lower extremities    WD-Lymphedema of both lower extremities       Past Medical History:   Diagnosis Date    Arthritis     Bipolar 1 disorder ([facility]     COPD (chronic obstructive pulmonary disease) ([facility]     \"use to have copd but is better\"    Hx of blood clots     \"had blood clot in lung and in my legs- that was in 2016- on Eliquis\"    Hyperlipidemia     Hypertension     Neuropathy     Schizo affective schizophrenia ([facility]     \"follow with Dr Yanick Boyle at [address] [place] [address] of nervous system     WD-Arterial insufficiency of lower extremity ([facility] 2/4/2021    WD-Cellulitis of left lower leg 2/4/2021    WD-Venous insufficiency of both lower extremities 2/4/2021    WDCellulitis of leg, right 2/4/2021       Past Surgical History:   Procedure Laterality Date    COLONOSCOPY  last one 2012    DENTAL SURGERY      \"wisdom teeth put to sleep- yrs ago\"    FRACTURE SURGERY  age 22    fx left fibia and tibia- hardware later removed    HYDROCELE EXCISION  10+ yrs ago    IR IVC FILTER PLACEMENT W IMAGING N/A 07/14/2016    ALN filter [id number], exp - Dr Yvrose Norwood  2012    L knee    UPPER GASTROINTESTINAL ENDOSCOPY  04/12/2018       Family History   Problem Relation Age of Onset    Cancer Mother         breast ca age 39, then later had someother form of cancer?unsure what it was    Heart Disease Father     Stroke Father     Cancer Sister     No Known Problems Brother          Objective:     Vitals:    10/19/21 1027   BP: 135/85   Pulse: 56   Weight: 270 lb (122.5 kg)   Height: 5' 10\" (1.778 m)     Neck circumference: 17  Inches  [place] - Total score: 7    Gen: No distress. Eyes: PERRL. No sclera icterus. No conjunctival injection. ENT: No discharge. Pharynx clear. External appearance of ears and nose normal.  Neck: Trachea midline. No obvious mass. Resp: No accessory muscle use. No crackles. No wheezes. No rhonchi. No dullness on percussion. CV: Regular rate. Regular rhythm. No murmur or rub. No edema. GI: Non-tender. Non-distended. No hernia. Skin: Warm, dry, normal texture and turgor. No nodule on exposed extremities. Lymph: No cervical LAD. No supraclavicular LAD. M/S: No cyanosis. No clubbing. No joint deformity. Psych: Oriented x 3. No anxiety. Awake. Alert. Intact judgement and insight.     Mallampati Airway Classification:   []1 []2 [x]3 []4        Sleep Complaints/Symptoms:    Normal Bedtime:      Normal Wake Time:   Average Sleep Time:      Number of Awakenings:    Duration of Sleep Complaints: 3 years    [x] Snoring     [] Improved [x] Not Improved    [] Choking/Gasping for Breath  [] Improved [] Not Improved       [x] Witnessed Apneas              [] Improved [x] Not Improved  [x] Daytime Sleepiness             [] Improved [x] Not Improved  [] Morning Headaches    [] Improved [] Not Improved  [] Frequent Awakenings       [] Improved [] Not Improved  [] Jerky Movements   [] Improved [] Not Improved   [] Restless Legs   [] Improved [] Not Improved   [] Difficulty Initiating Sleep  [] Improved [] Not Improved   [] Difficulty Maintaining Sleep  [] Improved [] Not Improved   [] Restless Sleep    [] Improved [] Not Improved   [] Sleep Paralysis    [] Improved [] Not Improved   [] Muscle Weakness w/ Emotion  [] Improved [] Not Improved  [] Other :     CPAP Usage:    [x]  Patient has never worn CPAP  []  Patient has worn CPAP previously but discontinued use  []  Current PAP user,  [years]   []  Patient Tolerates Well   []  Patient Does Not Tolerate     []  Patient Uses CPAP      []  More Than 4 Hours      []  Less Than 4 Hours  []  CPAP/BPAP/ASV Pressure Readings   []  CPAP Pressure      cm H20   []  BPAP Pressure       cm H20   []  ASV Pressure         cm H20      Assessment:      Diagnosis:  annabella       Patient Active Problem List    Diagnosis Date Noted    WD-Lymphedema of both lower extremities 02/25/2021    WD-Cellulitis of left lower leg 02/04/2021    WDCellulitis of leg, right 02/04/2021    WD-Arterial insufficiency of lower extremity ([address] 02/04/2021    WD-Venous insufficiency of both lower extremities 02/04/2021    Bipolar disorder, current episode mixed, moderate (HCC)     CIDP (chronic inflammatory demyelinating polyneuropathy) ([address] 07/18/2016    Pulmonary embolism without acute cor pulmonale ([address]     Morbid obesity due to excess calories ([address]     SOB (shortness of breath) on exertion 07/12/2016    Acute pulmonary embolism ([address] 07/12/2016    Gait disturbance 07/12/2016    Dizzy 07/12/2016    Morbid obesity ([address] 07/12/2016    Bipolar disorder ([facility] 07/12/2016    History of DVT of lower extremity 07/12/2016    General weakness     Abnormal EKG     Exertional dyspnea     Sinus bradycardia      Plan:        Sleep Study:     []  Sleep hygiene/ relaxation methods & CBTi principles review with patient     []  HST - Home Sleep Study   [x]  PSG - Overnight Diagnostic Polysomnogram     []  CPAP Titration    [] Split Night Study    [] BiLevel Titration    [] ASV - Auto-Servo Ventilation Titration       []  MSLT - Multiple Sleep Latency Test   []  MWT - Maintenance of Wakefulness Test    CPAP Therapy:     []  Patient to be seen for new mask fitting/desensitization   []  AutoPAP Titration    []  CPAP supplies and equipment at ________cmH2O    []  Continue same CPAP pressure   []  Change CPAP pressure to _______cm H2O   []  CPAP

## 2021-11-12 ENCOUNTER — APPOINTMENT (OUTPATIENT)
Dept: GENERAL RADIOLOGY | Age: 66
End: 2021-11-12
Payer: COMMERCIAL

## 2021-11-12 ENCOUNTER — HOSPITAL ENCOUNTER (EMERGENCY)
Age: 66
Discharge: HOME | End: 2021-11-12
Payer: COMMERCIAL

## 2021-11-12 VITALS
HEART RATE: 80 BPM | OXYGEN SATURATION: 98 % | RESPIRATION RATE: 16 BRPM | SYSTOLIC BLOOD PRESSURE: 169 MMHG | TEMPERATURE: 98 F | DIASTOLIC BLOOD PRESSURE: 92 MMHG

## 2021-11-12 DIAGNOSIS — M79.671 ARCH PAIN OF RIGHT FOOT: Primary | ICD-10-CM

## 2021-11-12 DIAGNOSIS — R93.89 ABNORMAL X-RAY: ICD-10-CM

## 2021-11-12 PROCEDURE — 6370000000 HC RX 637 (ALT 250 FOR IP): Performed by: PHYSICIAN ASSISTANT

## 2021-11-12 PROCEDURE — 73630 X-RAY EXAM OF FOOT: CPT

## 2021-11-12 PROCEDURE — 99284 EMERGENCY DEPT VISIT MOD MDM: CPT

## 2021-11-12 RX ORDER — ACETAMINOPHEN 500 MG
1000 TABLET ORAL ONCE
Status: COMPLETED | OUTPATIENT
Start: 2021-11-12 | End: 2021-11-12

## 2021-11-12 RX ADMIN — ACETAMINOPHEN 1000 MG: 500 TABLET ORAL at 15:46

## 2021-11-12 ASSESSMENT — PAIN DESCRIPTION - PAIN TYPE: TYPE: ACUTE PAIN

## 2021-11-12 ASSESSMENT — PAIN SCALES - GENERAL
PAINLEVEL_OUTOF10: 8
PAINLEVEL_OUTOF10: 8

## 2021-11-12 NOTE — ED PROVIDER NOTES
EMERGENCY DEPARTMENT ENCOUNTER      PCP: Rajendra Mike, [address] [facility]    Chief Complaint   Patient presents with    Foot Pain     right foot, NKI       This patient was not evaluated by the attending physician. I have independently evaluated this patient. HPI    Zechariah Hines is a 77 y.o. male who presents with pain localized in the Right foot. Onset is yesterday. Pain is localized to plantar aspect of foot. The context was patient states he started having right foot pain yesterday with no known injury. Patient denies being on his feet any more than usual.  Patient has not taken anything for pain. Patient has history of lymphedema, states right foot may be a little bit more swollen than usual otherwise denies any lower extremity swelling. The pain severity is moderate. The patient has no associated other injuries. The pain is aggravated by ambulation and direct palpation.       REVIEW OF SYSTEMS    General: No fever or chills  Skin: No lacerations or puncture wounds  Musculoskeletal: No other joint pain    All other review of systems are negative  See HPI and nursing notes for additional information       PAST MEDICAL & SURGICAL HISTORY    Past Medical History:   Diagnosis Date    Arthritis     Bipolar 1 disorder ([address]     COPD (chronic obstructive pulmonary disease) ([address]     \"use to have copd but is better\"    Hx of blood clots     \"had blood clot in lung and in my legs- that was in 2016- on Eliquis\"    Hyperlipidemia     Hypertension     Neuropathy     Schizo affective schizophrenia ([address]     \"follow with Dr Doug Hall at [address] [place] [address] of nervous system     WD-Arterial insufficiency of lower extremity ([address] 2/4/2021    WD-Cellulitis of left lower leg 2/4/2021    WD-Venous insufficiency of both lower extremities 2/4/2021    WDCellulitis of leg, right 2/4/2021     Past Surgical History:   Procedure Laterality Date    COLONOSCOPY  last one 2012   [facility] \"wisdom teeth put to sleep- yrs ago\"    FRACTURE SURGERY  age 22    fx left fibia and tibia- hardware later removed    HYDROCELE EXCISION  10+ yrs ago    IR IVC FILTER PLACEMENT W IMAGING N/A 07/14/2016    ALN filter lot#[id number], exp - Dr Vianey Friedman  2012    L knee    UPPER GASTROINTESTINAL ENDOSCOPY  04/12/2018       CURRENT MEDICATIONS    Current Outpatient Rx   Medication Sig Dispense Refill    clotrimazole-betamethasone (LOTRISONE) 1-0.05 % cream Apply topically 2 times daily. 45 g 1    clotrimazole-betamethasone (LOTRISONE) 1-0.05 % cream Apply topically 2 times daily. 45 g 1    risperiDONE (RISPERDAL) 3 MG tablet Take 3 mg by mouth nightly      aspirin 81 MG chewable tablet Take 1 tablet by mouth daily 30 tablet 3    apixaban (ELIQUIS) 5 MG TABS tablet Take 1 tablet by mouth 2 times daily 60 tablet 0    atorvastatin (LIPITOR) 40 MG tablet Take 1 tablet by mouth nightly 30 tablet 0    carvedilol (COREG) 3.125 MG tablet Take 1 tablet by mouth 2 times daily (with meals) 60 tablet 0    potassium chloride (K-DUR) 10 MEQ tablet Take 1 tablet by mouth daily 30 tablet 0    divalproex (DEPAKOTE) 500 MG DR tablet Take 500 mg by mouth 2 times daily      lithium 300 MG tablet Take 300 mg by mouth 2 times daily      furosemide (LASIX) 40 MG tablet   Take 40 mg by mouth 2 times daily Pt takes 40 in the am and 20 at noon         ALLERGIES    Allergies   Allergen Reactions    Thorazine [Chlorpromazine] Other (See Comments)     \"makes me do strange things\"       SOCIAL & FAMILY HISTORY    Social History     Socioeconomic History    Marital status: Single     Spouse name: None    Number of children: None    Years of education: None    Highest education level: None   Occupational History    None   Tobacco Use    Smoking status: Never Smoker    Smokeless tobacco: Never Used   Vaping Use    Vaping Use: Never used   Substance and Sexual Activity    Alcohol use:  No Comment: stated has not had a drink in a year/\"quit drinking  30 yrs ago- use to drink on weekends- 4-5 beers\"    Drug use: Not Currently     Types: Marijuana Delona Members)     Comment: \"last used over 30 yrs ago\"    Sexual activity: None   Other Topics Concern    None   Social History Narrative    None     Social Determinants of Health     Financial Resource Strain:     Difficulty of Paying Living Expenses: Not on file   Food Insecurity:     Worried About Running Out of Food in the Last Year: Not on file    Spike of Food in the Last Year: Not on file   Transportation Needs:     Lack of Transportation (Medical): Not on file    Lack of Transportation (Non-Medical):  Not on file   Physical Activity:     Days of Exercise per Week: Not on file    Minutes of Exercise per Session: Not on file   Stress:     Feeling of Stress : Not on file   Social Connections:     Frequency of Communication with Friends and Family: Not on file    Frequency of Social Gatherings with Friends and Family: Not on file    Attends Rastafari Services: Not on file    Active Member of [address] [place] [address] or Organizations: Not on file    Attends Club or Organization Meetings: Not on file    Marital Status: Not on file   Intimate Partner Violence:     Fear of Current or Ex-Partner: Not on file    Emotionally Abused: Not on file    Physically Abused: Not on file    Sexually Abused: Not on file   Housing Stability:     Unable to Pay for Housing in the Last Year: Not on file    Number of [place] in the Last Year: Not on file    Unstable Housing in the Last Year: Not on file     Family History   Problem Relation Age of Onset    Cancer Mother         breast ca age 39, then later had someother form of cancer?unsure what it was    Heart Disease Father     Stroke Father     Cancer Sister     No Known Problems Brother          PHYSICAL EXAM    VITAL SIGNS: BP (!) 178/103 Comment: hasnt taken medication yet this AM  Pulse 82   Temp 98 °F (36.7 °C) (Oral)   Resp 18   SpO2 98%   Constitutional:  Well developed, appears comfortable  HENT:  Atraumatic  Respiratory:  No retractions  Musculoskeletal:   Right Lower Extemity:  The Right foot demonstrates mild generalized swelling. No overlying erythema or ecchymosis. There is mild tenderness over plantar aspect. No palpable defects. Range of motion intact - no obvious deficits. The ankle joint is stable. Moderate right lower extremity edema. No calf tenderness. Distal capillary refill, sensation, motor intact. Vascular:  DP pulse 2+, capillary refill intact. Integument:  No open wounds   Neurologic:  Awake alert, no slurred speech     RADIOLOGY   XR FOOT RIGHT (MIN 3 VIEWS)   Final Result   Dorsal subluxation of the 5th metatarsophalangeal joint and nonspecific soft   tissue swelling. ED COURSE & MEDICAL DECISION MAKING      Patient presents as above. No external signs of infection. Distal sensation and pulses intact. Right foot x-ray shows dorsal subluxation of the fifth metatarsal phalangeal joint and nonspecific soft tissue swelling. Patient is not tender in this region I suspect it is chronic in nature. Consult orthopedist Dr. Miguel Catherine who agrees that x-ray results are likely chronic in nature, recommend follow-up on outpatient basis. I discussed possibility of plantar fasciitis. Patient provided plantar fasciitis stretches. I recommend shoes with good supports, patient provided postop shoe. Patient has walker at home. I recommend rest, ice, compression, elevation. I recommend follow-up with orthopedist by calling to schedule appointment for reevaluation. Clinical  IMPRESSION    1. Arch pain of right foot    2. Abnormal x-ray          Diagnosis and plan discussed in detail with patient who understands and agrees. Patient agrees to return emergency department if symptoms worsen or any new symptoms develop.     Comment: Please note this report has been produced using speech recognition software and may contain errors related to that system including errors in grammar, punctuation, and spelling, as well as words and phrases that may be inappropriate. If there are any questions or concerns please feel free to contact the dictating provider for clarification.             Rosa Acosta PA-C  11/12/21 [id number]

## 2021-11-17 ENCOUNTER — HOSPITAL ENCOUNTER (OUTPATIENT)
Dept: SLEEP CENTER | Age: 66
Discharge: HOME | End: 2021-11-17
Payer: COMMERCIAL

## 2021-11-17 DIAGNOSIS — G47.33 OSA (OBSTRUCTIVE SLEEP APNEA): ICD-10-CM

## 2021-11-17 PROCEDURE — 95810 POLYSOM 6/> YRS 4/> PARAM: CPT

## 2021-11-18 NOTE — PROGRESS NOTES
11/18/2021  sleep study  for Sharren Reasons  1955 is complete. Results are pending physician review.     Electronically signed by Nadia Bowling on 11/18/2021 at 3:12 AM

## 2021-11-30 LAB — STATUS: NORMAL

## 2021-12-02 ENCOUNTER — HOSPITAL ENCOUNTER (OUTPATIENT)
Age: 66
Discharge: HOME | End: 2021-12-02
Payer: COMMERCIAL

## 2021-12-02 LAB
BUN BLDV-MCNC: 15 MG/DL (ref 6–23)
CHOLESTEROL: 140 MG/DL
CREAT SERPL-MCNC: 1.1 MG/DL (ref 0.9–1.3)
DOSE AMOUNT: NORMAL
DOSE TIME: NORMAL
ESTIMATED AVERAGE GLUCOSE: 100 MG/DL
GFR AFRICAN AMERICAN: >60 ML/MIN/1.73M2
GFR NON-AFRICAN AMERICAN: >60 ML/MIN/1.73M2
HBA1C MFR BLD: 5.1 % (ref 4.2–6.3)
HDLC SERPL-MCNC: 60 MG/DL
LDL CHOLESTEROL DIRECT: 57 MG/DL
LITHIUM LEVEL: 0.5 MMOL/L (ref 0.6–1.2)
TRIGL SERPL-MCNC: 131 MG/DL
TSH HIGH SENSITIVITY: 1.21 UIU/ML (ref 0.27–4.2)
VALPROIC ACID LEVEL: 67.4 UG/ML (ref 50–100)

## 2021-12-02 PROCEDURE — 80178 ASSAY OF LITHIUM: CPT

## 2021-12-02 PROCEDURE — 80164 ASSAY DIPROPYLACETIC ACD TOT: CPT

## 2021-12-02 PROCEDURE — 83721 ASSAY OF BLOOD LIPOPROTEIN: CPT

## 2021-12-02 PROCEDURE — 36415 COLL VENOUS BLD VENIPUNCTURE: CPT

## 2021-12-02 PROCEDURE — 83036 HEMOGLOBIN GLYCOSYLATED A1C: CPT

## 2021-12-02 PROCEDURE — 84520 ASSAY OF UREA NITROGEN: CPT

## 2021-12-02 PROCEDURE — 82565 ASSAY OF CREATININE: CPT

## 2021-12-02 PROCEDURE — 84443 ASSAY THYROID STIM HORMONE: CPT

## 2021-12-02 PROCEDURE — 80061 LIPID PANEL: CPT

## 2021-12-14 ENCOUNTER — HOSPITAL ENCOUNTER (OUTPATIENT)
Dept: SLEEP CENTER | Age: 66
Discharge: HOME | End: 2021-12-14

## 2021-12-14 DIAGNOSIS — G47.33 OSA (OBSTRUCTIVE SLEEP APNEA): Primary | ICD-10-CM

## 2021-12-14 PROCEDURE — 9990000010 HC NO CHARGE VISIT

## 2021-12-14 NOTE — PROGRESS NOTES
Komal Garcia MD, Tracee Rainey MD, Cole Brian MD, [facility]      [address], [address] [facility]   PH: [phone]  F: [phone]     Subjective:     Patient ID: Jocelynn Ridley is a 77 y.o. male, following up today with the sleep center. Reason for Follow Up/Chief Complaint:   Chief Complaint   Patient presents with    Sleep Apnea     G47.33    1 Month Follow-Up       Results:    History: no change    Social History     Socioeconomic History    Marital status: Single     Spouse name: Not on file    Number of children: Not on file    Years of education: Not on file    Highest education level: Not on file   Occupational History    Not on file   Tobacco Use    Smoking status: Never Smoker    Smokeless tobacco: Never Used   Vaping Use    Vaping Use: Never used   Substance and Sexual Activity    Alcohol use: No     Comment: stated has not had a drink in a year/\"quit drinking  30 yrs ago- use to drink on weekends- 4-5 beers\"    Drug use: Not Currently     Types: Marijuana [place])     Comment: \"last used over 30 yrs ago\"    Sexual activity: Not on file   Other Topics Concern    Not on file   Social History Narrative    Not on file     Social Determinants of Health     Financial Resource Strain:     Difficulty of Paying Living Expenses: Not on file   Food Insecurity:     Worried About Running Out of Food in the Last Year: Not on file    Spike of Food in the Last Year: Not on file   Transportation Needs:     Lack of Transportation (Medical): Not on file    Lack of Transportation (Non-Medical):  Not on file   Physical Activity:     Days of Exercise per Week: Not on file    Minutes of Exercise per Session: Not on file   Stress:     Feeling of Stress : Not on file   Social Connections:     Frequency of Communication with Friends and Family: Not on file    Frequency of Social Gatherings with Friends and Family: Not on file    Attends Anabaptist Services: Not on file    Active Member of Clubs or Organizations: Not on file    Attends Club or Organization Meetings: Not on file    Marital Status: Not on file   Intimate Partner Violence:     Fear of Current or Ex-Partner: Not on file    Emotionally Abused: Not on file    Physically Abused: Not on file    Sexually Abused: Not on file   Housing Stability:     Unable to Pay for Housing in the Last Year: Not on file    Number of [place] in the Last Year: Not on file    Unstable Housing in the Last Year: Not on file       Prior to Admission medications    Medication Sig Start Date End Date Taking? Authorizing Provider   clotrimazole-betamethasone (LOTRISONE) 1-0.05 % cream Apply topically 2 times daily. 2/11/21  Yes Eris Daugherty MD   clotrimazole-betamethasone (LOTRISONE) 1-0.05 % cream Apply topically 2 times daily.  2/4/21  Yes Eris Daugherty MD   risperiDONE (RISPERDAL) 3 MG tablet Take 3 mg by mouth nightly   Yes Historical Provider, MD   aspirin 81 MG chewable tablet Take 1 tablet by mouth daily 7/26/16  Yes GINNY Young MD   apixaban Truddie Gil) 5 MG TABS tablet Take 1 tablet by mouth 2 times daily 7/26/16  Yes Lalitha Gordon MD   atorvastatin (LIPITOR) 40 MG tablet Take 1 tablet by mouth nightly 7/26/16  Yes GINNY Young MD   carvedilol (COREG) 3.125 MG tablet Take 1 tablet by mouth 2 times daily (with meals) 7/26/16  Yes GINNY Young MD   potassium chloride (K-DUR) 10 MEQ tablet Take 1 tablet by mouth daily 7/26/16  Yes Lalitha Gordon MD   divalproex (DEPAKOTE) 500 MG DR tablet Take 500 mg by mouth 2 times daily   Yes Historical Provider, MD   lithium 300 MG tablet Take 300 mg by mouth 2 times daily   Yes Historical Provider, MD   furosemide (LASIX) 40 MG tablet   Take 40 mg by mouth 2 times daily Pt takes 40 in the am and 20 at noon   Yes Historical Provider, MD       Allergies as of 12/14/2021 - Fully Reviewed 12/14/2021 Allergen Reaction Noted    Thorazine [chlorpromazine] Other (See Comments) 06/07/2017       Patient Active Problem List   Diagnosis    SOB (shortness of breath) on exertion    Acute pulmonary embolism (HCC)    Gait disturbance    Dizzy    Morbid obesity ([address]    [facility] disorder [facility]    History of DVT of lower extremity    General weakness    Abnormal EKG    Exertional dyspnea    Sinus bradycardia    Pulmonary embolism without acute cor pulmonale (HCC)    Morbid obesity due to excess calories (HCC)    CIDP (chronic inflammatory demyelinating polyneuropathy) (HCC)    Bipolar disorder, current episode mixed, moderate (HCC)    WD-Cellulitis of left lower leg    WDCellulitis of leg, right    WD-Arterial insufficiency of lower extremity (HCC)    WD-Venous insufficiency of both lower extremities    WD-Lymphedema of both lower extremities       Past Medical History:   Diagnosis Date    Arthritis     Bipolar 1 disorder ([facility]     COPD (chronic obstructive pulmonary disease) ([facility]     \"use to have copd but is better\"    Hx of blood clots     \"had blood clot in lung and in my legs- that was in 2016- on Eliquis\"    Hyperlipidemia     Hypertension     Neuropathy     Schizo affective schizophrenia ([facility]     \"follow with Dr Haroon Varma at [address] [place] [address] of nervous system     WD-Arterial insufficiency of lower extremity ([facility] 2/4/2021    WD-Cellulitis of left lower leg 2/4/2021    WD-Venous insufficiency of both lower extremities 2/4/2021    WDCellulitis of leg, right 2/4/2021       Past Surgical History:   Procedure Laterality Date    COLONOSCOPY  last one 2012    DENTAL SURGERY      \"wisdom teeth put to sleep- yrs ago\"    FRACTURE SURGERY  age 22    fx left fibia and tibia- hardware later removed    HYDROCELE EXCISION  10+ yrs ago    IR IVC FILTER PLACEMENT W IMAGING N/A 07/14/2016    ALN filter lot#[id number], exp - Dr Vincent Mckoy  2012    L knee    UPPER GASTROINTESTINAL ENDOSCOPY  04/12/2018       Family History   Problem Relation Age of Onset    Cancer Mother         breast ca age 39, then later had someother form of cancer?unsure what it was    Heart Disease Father     Stroke Father     Cancer Sister     No Known Problems Brother          Objective: There were no vitals filed for this visit. Neck -    Inches  [place] -        Assessment:      Diagnosis:  Mod annabella with ahi 19/jhr and pulse ox drop to 83%     Plan:      1.d/w pt  2.cpm  3.cpap titration  rtc afterwards    Ricardo Watkins is a 77 y.o. male being evaluated by a Virtual Visit (video visit) encounter to address concerns as mentioned above. A caregiver was present when appropriate. Due to this being a TeleHealth encounter (During GDVXJ-64 public health emergency), evaluation of the following organ systems was limited: Vitals/Constitutional/EENT/Resp/CV/GI//MS/Neuro/Skin/Heme-Lymph-Imm. Pursuant to the emergency declaration under the [address] [place] [address] and the [employer] Act, this Virtual Visit was conducted with patient's (and/or legal guardian's) consent, to reduce the patient's risk of exposure to COVID-19 and provide necessary medical care. The patient (and/or legal guardian) has also been advised to contact this office for worsening conditions or problems, and seek emergency medical treatment and/or call 911 if deemed necessary. Patient identification was verified at the start of the visit: Yes    Services were provided through a video synchronous discussion virtually to substitute for in-person clinic visit. Patient and provider were located at their individual homes. --Jeff Parson MD on 12/14/2021 at 10:53 AM    An electronic signature was used to authenticate this note. No orders of the defined types were placed in this encounter.          Electronically signed by Didier Wood MD on 12/14/2021 at 10:53 AM

## 2021-12-15 NOTE — PROGRESS NOTES
Results for the most recent sleep study on [facility]  1955 are finalized and available. Please see media tab.     Electronically signed by Bertha Be on 12/14/2021 at 9:52 PM

## 2022-01-19 ENCOUNTER — HOSPITAL ENCOUNTER (OUTPATIENT)
Dept: SLEEP CENTER | Age: 67
Discharge: HOME | End: 2022-01-19
Payer: COMMERCIAL

## 2022-01-19 VITALS — BODY MASS INDEX: 38.65 KG/M2 | HEIGHT: 70 IN | WEIGHT: 270 LBS

## 2022-01-19 DIAGNOSIS — G47.33 OSA (OBSTRUCTIVE SLEEP APNEA): ICD-10-CM

## 2022-01-19 PROCEDURE — 95811 POLYSOM 6/>YRS CPAP 4/> PARM: CPT

## 2022-01-19 ASSESSMENT — SLEEP AND FATIGUE QUESTIONNAIRES
HOW LIKELY ARE YOU TO NOD OFF OR FALL ASLEEP WHEN YOU ARE A PASSENGER IN A CAR FOR AN HOUR WITHOUT A BREAK: 0
HOW LIKELY ARE YOU TO NOD OFF OR FALL ASLEEP IN A CAR, WHILE STOPPED FOR A FEW MINUTES IN TRAFFIC: 0
HOW LIKELY ARE YOU TO NOD OFF OR FALL ASLEEP WHILE SITTING AND TALKING TO SOMEONE: 0
HOW LIKELY ARE YOU TO NOD OFF OR FALL ASLEEP WHILE SITTING AND READING: 1
ESS TOTAL SCORE: 6
HOW LIKELY ARE YOU TO NOD OFF OR FALL ASLEEP WHILE SITTING QUIETLY AFTER LUNCH WITHOUT ALCOHOL: 0
HOW LIKELY ARE YOU TO NOD OFF OR FALL ASLEEP WHILE LYING DOWN TO REST IN THE AFTERNOON WHEN CIRCUMSTANCES PERMIT: 3
HOW LIKELY ARE YOU TO NOD OFF OR FALL ASLEEP WHILE WATCHING TV: 2
HOW LIKELY ARE YOU TO NOD OFF OR FALL ASLEEP WHILE SITTING INACTIVE IN A PUBLIC PLACE: 0

## 2022-01-20 NOTE — PROGRESS NOTES
1/20/2022  sleep study  for Mary Ayoub  1955 is complete. Results are pending physician review.     Electronically signed by Isai Arce on 1/20/2022 at 5:00 AM

## 2022-01-25 LAB — STATUS: NORMAL

## 2022-02-17 ENCOUNTER — HOSPITAL ENCOUNTER (OUTPATIENT)
Age: 67
Discharge: HOME | End: 2022-02-17
Payer: COMMERCIAL

## 2022-02-17 LAB
ALT SERPL-CCNC: 11 U/L (ref 10–40)
ANION GAP SERPL CALCULATED.3IONS-SCNC: 9 MMOL/L (ref 4–16)
BASOPHILS ABSOLUTE: 0.1 K/CU MM
BASOPHILS RELATIVE PERCENT: 0.7 % (ref 0–1)
BUN BLDV-MCNC: 12 MG/DL (ref 6–23)
CALCIUM SERPL-MCNC: 9.3 MG/DL (ref 8.3–10.6)
CHLORIDE BLD-SCNC: 98 MMOL/L (ref 99–110)
CHOLESTEROL: 135 MG/DL
CO2: 29 MMOL/L (ref 21–32)
CREAT SERPL-MCNC: 0.9 MG/DL (ref 0.9–1.3)
DIFFERENTIAL TYPE: ABNORMAL
EOSINOPHILS ABSOLUTE: 0.5 K/CU MM
EOSINOPHILS RELATIVE PERCENT: 5.4 % (ref 0–3)
ESTIMATED AVERAGE GLUCOSE: 105 MG/DL
GFR AFRICAN AMERICAN: >60 ML/MIN/1.73M2
GFR NON-AFRICAN AMERICAN: >60 ML/MIN/1.73M2
GLUCOSE BLD-MCNC: 81 MG/DL (ref 70–99)
HBA1C MFR BLD: 5.3 % (ref 4.2–6.3)
HCT VFR BLD CALC: 43.5 % (ref 42–52)
HDLC SERPL-MCNC: 67 MG/DL
HEMOGLOBIN: 13.7 GM/DL (ref 13.5–18)
IMMATURE NEUTROPHIL %: 0.3 % (ref 0–0.43)
LDL CHOLESTEROL CALCULATED: 44 MG/DL
LITHIUM LEVEL: 0.9 MMOL/L (ref 0.6–1.2)
LYMPHOCYTES ABSOLUTE: 1.4 K/CU MM
LYMPHOCYTES RELATIVE PERCENT: 15.7 % (ref 24–44)
MCH RBC QN AUTO: 31.3 PG (ref 27–31)
MCHC RBC AUTO-ENTMCNC: 31.5 % (ref 32–36)
MCV RBC AUTO: 99.3 FL (ref 78–100)
MONOCYTES ABSOLUTE: 0.7 K/CU MM
MONOCYTES RELATIVE PERCENT: 7.6 % (ref 0–4)
NUCLEATED RBC %: 0 %
PDW BLD-RTO: 13.5 % (ref 11.7–14.9)
PLATELET # BLD: 192 K/CU MM (ref 140–440)
PMV BLD AUTO: 9.9 FL (ref 7.5–11.1)
POTASSIUM SERPL-SCNC: 4.5 MMOL/L (ref 3.5–5.1)
PROSTATE SPECIFIC ANTIGEN: 2.09 NG/ML (ref 0–4)
RBC # BLD: 4.38 M/CU MM (ref 4.6–6.2)
SEGMENTED NEUTROPHILS ABSOLUTE COUNT: 6.4 K/CU MM
SEGMENTED NEUTROPHILS RELATIVE PERCENT: 70.3 % (ref 36–66)
SODIUM BLD-SCNC: 136 MMOL/L (ref 135–145)
TOTAL IMMATURE NEUTOROPHIL: 0.03 K/CU MM
TOTAL NUCLEATED RBC: 0 K/CU MM
TRIGL SERPL-MCNC: 120 MG/DL
TSH HIGH SENSITIVITY: 2.08 UIU/ML (ref 0.27–4.2)
WBC # BLD: 9.1 K/CU MM (ref 4–10.5)

## 2022-02-17 PROCEDURE — 85025 COMPLETE CBC W/AUTO DIFF WBC: CPT

## 2022-02-17 PROCEDURE — 83036 HEMOGLOBIN GLYCOSYLATED A1C: CPT

## 2022-02-17 PROCEDURE — 80178 ASSAY OF LITHIUM: CPT

## 2022-02-17 PROCEDURE — 36415 COLL VENOUS BLD VENIPUNCTURE: CPT

## 2022-02-17 PROCEDURE — 80048 BASIC METABOLIC PNL TOTAL CA: CPT

## 2022-02-17 PROCEDURE — 84443 ASSAY THYROID STIM HORMONE: CPT

## 2022-02-17 PROCEDURE — 80061 LIPID PANEL: CPT

## 2022-02-17 PROCEDURE — G0103 PSA SCREENING: HCPCS

## 2022-02-17 PROCEDURE — 84460 ALANINE AMINO (ALT) (SGPT): CPT

## 2022-05-17 ENCOUNTER — HOSPITAL ENCOUNTER (OUTPATIENT)
Dept: SLEEP CENTER | Age: 67
Discharge: HOME | End: 2022-05-17

## 2022-05-17 PROCEDURE — 9990000010 HC NO CHARGE VISIT

## 2022-05-17 NOTE — PROGRESS NOTES
Marybeth Winter MD, Unique Tejada MD, Aileen Castillo MD, Nida Cleveland MD, [facility]      [address], [address] [facility]   PH: [phone]  F: [phone]     Subjective:     Patient ID: Katherin Tran is a 79 y.o. male, referred to the sleep center for   Chief Complaint   Patient presents with    Sleep Apnea     G47.33    3 Month Follow-Up   . Referring physician:  Millicent chaney    Social History     Socioeconomic History    Marital status: Single     Spouse name: Not on file    Number of children: Not on file    Years of education: Not on file    Highest education level: Not on file   Occupational History    Not on file   Tobacco Use    Smoking status: Never Smoker    Smokeless tobacco: Never Used   Vaping Use    Vaping Use: Never used   Substance and Sexual Activity    Alcohol use: No     Comment: stated has not had a drink in a year/\"quit drinking  30 yrs ago- use to drink on weekends- 4-5 beers\"    Drug use: Not Currently     Types: Marijuana Nina Dasen)     Comment: \"last used over 30 yrs ago\"    Sexual activity: Not on file   Other Topics Concern    Not on file   Social History Narrative    Not on file     Social Determinants of Health     Financial Resource Strain:     Difficulty of Paying Living Expenses: Not on file   Food Insecurity:     Worried About Running Out of Food in the Last Year: Not on file    Spike of Food in the Last Year: Not on file   Transportation Needs:     Lack of Transportation (Medical): Not on file    Lack of Transportation (Non-Medical):  Not on file   Physical Activity:     Days of Exercise per Week: Not on file    Minutes of Exercise per Session: Not on file   Stress:     Feeling of Stress : Not on file   Social Connections:     Frequency of Communication with Friends and Family: Not on file    Frequency of Social Gatherings with Friends and Family: Not on file    Attends Sabianist Services: Not on file    Active Member of Clubs or Organizations: Not on file    Attends Club or Organization Meetings: Not on file    Marital Status: Not on file   Intimate Partner Violence:     Fear of Current or Ex-Partner: Not on file    Emotionally Abused: Not on file    Physically Abused: Not on file    Sexually Abused: Not on file   Housing Stability:     Unable to Pay for Housing in the Last Year: Not on file    Number of [place] in the Last Year: Not on file    Unstable Housing in the Last Year: Not on file       Prior to Admission medications    Medication Sig Start Date End Date Taking? Authorizing Provider   clotrimazole-betamethasone (LOTRISONE) 1-0.05 % cream Apply topically 2 times daily. 2/11/21  Yes Sen Torres MD   clotrimazole-betamethasone (LOTRISONE) 1-0.05 % cream Apply topically 2 times daily.  2/4/21  Yes Sen Torres MD   risperiDONE (RISPERDAL) 3 MG tablet Take 3 mg by mouth nightly   Yes Historical Provider, MD   aspirin 81 MG chewable tablet Take 1 tablet by mouth daily 7/26/16  Yes GINNY Beltre MD   apixaban (ELIQUIS) 5 MG TABS tablet Take 1 tablet by mouth 2 times daily 7/26/16  Yes GINNY Beltre MD   carvedilol (COREG) 3.125 MG tablet Take 1 tablet by mouth 2 times daily (with meals) 7/26/16  Yes GINNY Beltre MD   potassium chloride (K-DUR) 10 MEQ tablet Take 1 tablet by mouth daily 7/26/16  Yes Jennifer Pearl MD   divalproex (DEPAKOTE) 500 MG DR tablet Take 500 mg by mouth 2 times daily   Yes Historical Provider, MD   lithium 300 MG tablet Take 300 mg by mouth 2 times daily   Yes Historical Provider, MD   furosemide (LASIX) 40 MG tablet   Take 40 mg by mouth 2 times daily Pt takes 40 in the am and 20 at noon   Yes Historical Provider, MD   atorvastatin (LIPITOR) 40 MG tablet Take 1 tablet by mouth nightly  Patient not taking: Reported on 5/17/2022 7/26/16   Jennifer Pearl MD       Allergies as of 05/17/2022 - Fully Reviewed 05/17/2022   Allergen Reaction Noted    Thorazine [chlorpromazine] Other (See Comments) 06/07/2017       Patient Active Problem List   Diagnosis    SOB (shortness of breath) on exertion    Acute pulmonary embolism (HCC)    Gait disturbance    Dizzy    Morbid obesity ([address]    Bipolar disorder ([address]    History of DVT of lower extremity    General weakness    Abnormal EKG    Exertional dyspnea    Sinus bradycardia    Pulmonary embolism without acute cor pulmonale (HCC)    Morbid obesity due to excess calories (HCC)    CIDP (chronic inflammatory demyelinating polyneuropathy) (HCC)    Bipolar disorder, current episode mixed, moderate (HCC)    WD-Cellulitis of left lower leg    WDCellulitis of leg, right    WD-Arterial insufficiency of lower extremity (HCC)    WD-Venous insufficiency of both lower extremities    WD-Lymphedema of both lower extremities       Past Medical History:   Diagnosis Date    Arthritis     Bipolar 1 disorder ([address]     COPD (chronic obstructive pulmonary disease) ([facility]     \"use to have copd but is better\"    Hx of blood clots     \"had blood clot in lung and in my legs- that was in 2016- on Eliquis\"    Hyperlipidemia     Hypertension     Neuropathy     Schizo affective schizophrenia ([address]     \"[facility] Jeannie Coombs [facility] [address] of nervous system     WD-Arterial insufficiency of lower extremity ([facility] 2/4/2021    WD-Cellulitis of left lower leg 2/4/2021    WD-Venous insufficiency of both lower extremities 2/4/2021    WDCellulitis of leg, right 2/4/2021       Past Surgical History:   Procedure Laterality Date    COLONOSCOPY  last one 2012    DENTAL SURGERY      \"wisdom teeth put to sleep- yrs ago\"    FRACTURE SURGERY  age 22    fx left fibia and tibia- hardware later removed    HYDROCELE EXCISION  10+ yrs ago    IR IVC FILTER PLACEMENT W IMAGING N/A 07/14/2016    ALN filter [id number], exp - Dr Sanchez Estess REPLACEMENT  2012    L knee    UPPER GASTROINTESTINAL ENDOSCOPY  04/12/2018       Family History   Problem Relation Age of Onset    Cancer Mother         breast ca age 39, then later had someother form of cancer?unsure what it was    Heart Disease Father     Stroke Father     Cancer Sister     No Known Problems Brother          Objective: There were no vitals filed for this visit. Inches  [place] -      Gen: No distress. Eyes: PERRL. No sclera icterus. No conjunctival injection. ENT: No discharge. Pharynx clear. External appearance of ears and nose normal.  Neck: Trachea midline. No obvious mass. Resp: No accessory muscle use. No crackles. No wheezes. No rhonchi. No dullness on percussion. CV: Regular rate. Regular rhythm. No murmur or rub. No edema. GI: Non-tender. Non-distended. No hernia. Skin: Warm, dry, normal texture and turgor. No nodule on exposed extremities. Lymph: No cervical LAD. No supraclavicular LAD. M/S: No cyanosis. No clubbing. No joint deformity. Psych: Oriented x 3. No anxiety. Awake. Alert. Intact judgement and insight.     Mallampati Airway Classification:   []1 []2 [x]3 []4        Sleep Complaints/Symptoms:    Normal Bedtime:      Normal Wake Time:   Average Sleep Time:      Number of Awakenings:    Duration of Sleep Complaints:3 years    [x] Snoring     [x] Improved [] Not Improved    [] Choking/Gasping for Breath  [] Improved [] Not Improved       [x] Witnessed Apneas              [x] Improved [] Not Improved  [x] Daytime Sleepiness             [x] Improved [] Not Improved  [] Morning Headaches    [] Improved [] Not Improved  [] Frequent Awakenings       [] Improved [] Not Improved  [] Jerky Movements   [] Improved [] Not Improved   [] Restless Legs   [] Improved [] Not Improved   [] Difficulty Initiating Sleep  [] Improved [] Not Improved   [] Difficulty Maintaining Sleep  [] Improved [] Not Improved   [] Restless Sleep    [] Improved [] Not Improved   [] Sleep Paralysis    [] Improved [] Not Improved   [] Muscle Weakness w/ Emotion  [] Improved [] Not Improved  [] Other :     CPAP Usage:    []  Patient has never worn CPAP  []  Patient has worn CPAP previously but discontinued use  [x]  Current PAP user,  [years]   [x]  Patient Tolerates Well   []  Patient Does Not Tolerate     []  Patient Uses CPAP      [x]  More Than 4 Hours      []  Less Than 4 Hours  []  CPAP/BPAP/ASV Pressure Readings   []  CPAP Pressure      cm H20   []  BPAP Pressure       cm H20   []  ASV Pressure         cm H20      Assessment:      Diagnosis:  annabella       Patient Active Problem List    Diagnosis Date Noted    WD-Lymphedema of both lower extremities 02/25/2021    WD-Cellulitis of left lower leg 02/04/2021    WDCellulitis of leg, right 02/04/2021    WD-Arterial insufficiency of lower extremity ([address] 02/04/2021    WD-Venous insufficiency of both lower extremities 02/04/2021    Bipolar disorder, current episode mixed, moderate (HCC)     CIDP (chronic inflammatory demyelinating polyneuropathy) ([address] 07/18/2016    Pulmonary embolism without acute cor pulmonale ([address]     Morbid obesity due to excess calories ([address]     SOB (shortness of breath) on exertion 07/12/2016    Acute pulmonary embolism ([address] 07/12/2016    Gait disturbance 07/12/2016    Dizzy 07/12/2016    Morbid obesity ([address] 07/12/2016    Bipolar disorder ([facility] 07/12/2016    History of DVT of lower extremity 07/12/2016    General weakness     Abnormal EKG     Exertional dyspnea     Sinus bradycardia      Plan:        Sleep Study:     []  Sleep hygiene/ relaxation methods & CBTi principles review with patient     []  HST - Home Sleep Study   []  PSG - Overnight Diagnostic Polysomnogram     []  CPAP Titration    [] Split Night Study    [] BiLevel Titration    [] ASV - Auto-Servo Ventilation Titration       []  MSLT - Multiple Sleep Latency Test   []  MWT - Maintenance of Wakefulness Test    CPAP Therapy:     []  Patient to be seen for new mask fitting/desensitization   []  AutoPAP Titration    []  CPAP supplies and equipment at ________cmH2O    [x]  Continue same CPAP pressure   []  Change CPAP pressure to _______cm H2O   []  CPAP supplies only, no pressure change   []  Refer for an oral appliance       Medications:       [x]  Continue current medication    []  Add Medication:  ________________    Follow-Up:     []  No follow up required. Patient to return as needed. []  2 weeks   []  4 weeks   []  2 months   []  4 months   []  6 months   []  1 year for CPAP compliance evaluation. Patient to return sooner, as needed. []  Follow up after sleep study   []  Other: __pt does not like cpap and would like to discontinue it. I d/w him longterm effects of not using it. He wants this to be discontinued. He is well aware of dangers. rtc prn_____also gave him choice for a dental device but he is not interested at present_______    No orders of the defined types were placed in this encounter.          Electronically signed by Sara Paniagua MD on 5/17/2022 at 11:33 AM

## 2022-06-17 ENCOUNTER — HOSPITAL ENCOUNTER (OUTPATIENT)
Age: 67
Discharge: HOME | End: 2022-06-17
Payer: COMMERCIAL

## 2022-06-17 LAB
ALBUMIN SERPL-MCNC: 4 GM/DL (ref 3.4–5)
ALP BLD-CCNC: 81 IU/L (ref 40–129)
ALT SERPL-CCNC: 16 U/L (ref 10–40)
ANION GAP SERPL CALCULATED.3IONS-SCNC: 8 MMOL/L (ref 4–16)
AST SERPL-CCNC: 14 IU/L (ref 15–37)
BACTERIA: NEGATIVE /HPF
BILIRUB SERPL-MCNC: 0.6 MG/DL (ref 0–1)
BILIRUBIN URINE: NEGATIVE MG/DL
BLOOD, URINE: ABNORMAL
BUN BLDV-MCNC: 15 MG/DL (ref 6–23)
CALCIUM SERPL-MCNC: 9.3 MG/DL (ref 8.3–10.6)
CHLORIDE BLD-SCNC: 95 MMOL/L (ref 99–110)
CHOLESTEROL: 178 MG/DL
CHOLESTEROL: 178 MG/DL
CLARITY: CLEAR
CO2: 30 MMOL/L (ref 21–32)
COLOR: YELLOW
CREAT SERPL-MCNC: 0.8 MG/DL (ref 0.9–1.3)
DOSE AMOUNT: ABNORMAL
DOSE TIME: ABNORMAL
ESTIMATED AVERAGE GLUCOSE: 100 MG/DL
GFR AFRICAN AMERICAN: >60 ML/MIN/1.73M2
GFR NON-AFRICAN AMERICAN: >60 ML/MIN/1.73M2
GLUCOSE BLD-MCNC: 96 MG/DL (ref 70–99)
GLUCOSE, URINE: NEGATIVE MG/DL
HBA1C MFR BLD: 5.1 % (ref 4.2–6.3)
HDLC SERPL-MCNC: 71 MG/DL
HDLC SERPL-MCNC: 71 MG/DL
KETONES, URINE: NEGATIVE MG/DL
LDL CHOLESTEROL CALCULATED: 84 MG/DL
LDL CHOLESTEROL CALCULATED: 84 MG/DL
LEUKOCYTE ESTERASE, URINE: NEGATIVE
LITHIUM LEVEL: 0.6 MMOL/L (ref 0.6–1.2)
MUCUS: ABNORMAL HPF
NITRITE URINE, QUANTITATIVE: NEGATIVE
PH, URINE: 6.5 (ref 5–8)
POTASSIUM SERPL-SCNC: 4.4 MMOL/L (ref 3.5–5.1)
PROTEIN UA: NEGATIVE MG/DL
RBC URINE: 3 /HPF (ref 0–3)
SODIUM BLD-SCNC: 133 MMOL/L (ref 135–145)
SPECIFIC GRAVITY UA: <1.005 (ref 1–1.03)
TOTAL PROTEIN: 6.9 GM/DL (ref 6.4–8.2)
TRICHOMONAS: ABNORMAL /HPF
TRIGL SERPL-MCNC: 113 MG/DL
TRIGL SERPL-MCNC: 113 MG/DL
TSH HIGH SENSITIVITY: 1.6 UIU/ML (ref 0.27–4.2)
UROBILINOGEN, URINE: 0.2 MG/DL (ref 0.2–1)
VALPROIC ACID LEVEL: 31.2 UG/ML (ref 50–100)
WBC UA: ABNORMAL /HPF (ref 0–2)

## 2022-06-17 PROCEDURE — 81001 URINALYSIS AUTO W/SCOPE: CPT

## 2022-06-17 PROCEDURE — 80053 COMPREHEN METABOLIC PANEL: CPT

## 2022-06-17 PROCEDURE — 36415 COLL VENOUS BLD VENIPUNCTURE: CPT

## 2022-06-17 PROCEDURE — 80164 ASSAY DIPROPYLACETIC ACD TOT: CPT

## 2022-06-17 PROCEDURE — 80061 LIPID PANEL: CPT

## 2022-06-17 PROCEDURE — 80178 ASSAY OF LITHIUM: CPT

## 2022-06-17 PROCEDURE — 84443 ASSAY THYROID STIM HORMONE: CPT

## 2022-06-17 PROCEDURE — 83036 HEMOGLOBIN GLYCOSYLATED A1C: CPT

## 2022-10-27 ENCOUNTER — HOSPITAL ENCOUNTER (OUTPATIENT)
Age: 67
Discharge: HOME | End: 2022-10-27
Payer: COMMERCIAL

## 2022-10-27 LAB
BACTERIA: NEGATIVE /HPF
BILIRUBIN URINE: NEGATIVE MG/DL
BLOOD, URINE: ABNORMAL
CLARITY: CLEAR
COLOR: YELLOW
DOSE AMOUNT: ABNORMAL
DOSE TIME: ABNORMAL
GLUCOSE, URINE: NEGATIVE MG/DL
KETONES, URINE: NEGATIVE MG/DL
LEUKOCYTE ESTERASE, URINE: NEGATIVE
LITHIUM LEVEL: 0.6 MMOL/L (ref 0.6–1.2)
NITRITE URINE, QUANTITATIVE: NEGATIVE
PH, URINE: 7 (ref 5–8)
PROTEIN UA: NEGATIVE MG/DL
RBC URINE: 5 /HPF (ref 0–3)
SODIUM BLD-SCNC: 139 MMOL/L (ref 135–145)
SPECIFIC GRAVITY UA: 1.01 (ref 1–1.03)
TRICHOMONAS: ABNORMAL /HPF
UROBILINOGEN, URINE: 0.2 MG/DL (ref 0.2–1)
VALPROIC ACID LEVEL: 32.1 UG/ML (ref 50–100)
WBC UA: ABNORMAL /HPF (ref 0–2)

## 2022-10-27 PROCEDURE — 81001 URINALYSIS AUTO W/SCOPE: CPT

## 2022-10-27 PROCEDURE — 80164 ASSAY DIPROPYLACETIC ACD TOT: CPT

## 2022-10-27 PROCEDURE — 80178 ASSAY OF LITHIUM: CPT

## 2022-10-27 PROCEDURE — 84295 ASSAY OF SERUM SODIUM: CPT

## 2022-10-27 PROCEDURE — 36415 COLL VENOUS BLD VENIPUNCTURE: CPT

## 2023-03-20 ENCOUNTER — HOSPITAL ENCOUNTER (OUTPATIENT)
Age: 68
Discharge: HOME | End: 2023-03-20
Payer: COMMERCIAL

## 2023-03-20 LAB
ALT SERPL-CCNC: 15 U/L (ref 10–40)
ANION GAP SERPL CALCULATED.3IONS-SCNC: 7 MMOL/L (ref 4–16)
BASOPHILS ABSOLUTE: 0.1 K/CU MM
BASOPHILS RELATIVE PERCENT: 0.7 % (ref 0–1)
BUN SERPL-MCNC: 16 MG/DL (ref 6–23)
BUN SERPL-MCNC: 16 MG/DL (ref 6–23)
CALCIUM SERPL-MCNC: 9 MG/DL (ref 8.3–10.6)
CHLORIDE BLD-SCNC: 98 MMOL/L (ref 99–110)
CHOLEST SERPL-MCNC: 190 MG/DL
CHOLEST SERPL-MCNC: 196 MG/DL
CO2: 30 MMOL/L (ref 21–32)
CREAT SERPL-MCNC: 1 MG/DL (ref 0.9–1.3)
CREAT SERPL-MCNC: 1 MG/DL (ref 0.9–1.3)
DIFFERENTIAL TYPE: ABNORMAL
DOSE AMOUNT: ABNORMAL
DOSE TIME: ABNORMAL
EOSINOPHILS ABSOLUTE: 0.4 K/CU MM
EOSINOPHILS RELATIVE PERCENT: 3.9 % (ref 0–3)
ESTIMATED AVERAGE GLUCOSE: 94 MG/DL
GFR SERPL CREATININE-BSD FRML MDRD: >60 ML/MIN/1.73M2
GFR SERPL CREATININE-BSD FRML MDRD: >60 ML/MIN/1.73M2
GLUCOSE SERPL-MCNC: 109 MG/DL (ref 70–99)
HBA1C MFR BLD: 4.9 % (ref 4.2–6.3)
HCT VFR BLD CALC: 45.9 % (ref 42–52)
HDLC SERPL-MCNC: 58 MG/DL
HDLC SERPL-MCNC: 58 MG/DL
HEMOGLOBIN: 14.5 GM/DL (ref 13.5–18)
IMMATURE NEUTROPHIL %: 0.3 % (ref 0–0.43)
LDLC SERPL CALC-MCNC: 103 MG/DL
LDLC SERPL CALC-MCNC: 98 MG/DL
LITHIUM LEVEL: 0.7 MMOL/L (ref 0.6–1.2)
LYMPHOCYTES ABSOLUTE: 1.3 K/CU MM
LYMPHOCYTES RELATIVE PERCENT: 13.9 % (ref 24–44)
MCH RBC QN AUTO: 32.2 PG (ref 27–31)
MCHC RBC AUTO-ENTMCNC: 31.6 % (ref 32–36)
MCV RBC AUTO: 101.8 FL (ref 78–100)
MONOCYTES ABSOLUTE: 0.6 K/CU MM
MONOCYTES RELATIVE PERCENT: 6.9 % (ref 0–4)
NUCLEATED RBC %: 0 %
PDW BLD-RTO: 13.2 % (ref 11.7–14.9)
PLATELET # BLD: 204 K/CU MM (ref 140–440)
PMV BLD AUTO: 10.2 FL (ref 7.5–11.1)
POTASSIUM SERPL-SCNC: 4.1 MMOL/L (ref 3.5–5.1)
PROSTATE SPECIFIC ANTIGEN: 1.96 NG/ML (ref 0–4)
RBC # BLD: 4.51 M/CU MM (ref 4.6–6.2)
SEGMENTED NEUTROPHILS ABSOLUTE COUNT: 6.9 K/CU MM
SEGMENTED NEUTROPHILS RELATIVE PERCENT: 74.3 % (ref 36–66)
SODIUM BLD-SCNC: 135 MMOL/L (ref 135–145)
TOTAL IMMATURE NEUTOROPHIL: 0.03 K/CU MM
TOTAL NUCLEATED RBC: 0 K/CU MM
TRIGL SERPL-MCNC: 172 MG/DL
TRIGL SERPL-MCNC: 175 MG/DL
TSH SERPL DL<=0.005 MIU/L-ACNC: 3.44 UIU/ML (ref 0.27–4.2)
TSH SERPL DL<=0.005 MIU/L-ACNC: 3.47 UIU/ML (ref 0.27–4.2)
VALPROIC ACID LEVEL: 40.9 UG/ML (ref 50–100)
WBC # BLD: 9.2 K/CU MM (ref 4–10.5)

## 2023-03-20 PROCEDURE — 80061 LIPID PANEL: CPT

## 2023-03-20 PROCEDURE — 80178 ASSAY OF LITHIUM: CPT

## 2023-03-20 PROCEDURE — 80048 BASIC METABOLIC PNL TOTAL CA: CPT

## 2023-03-20 PROCEDURE — 85025 COMPLETE CBC W/AUTO DIFF WBC: CPT

## 2023-03-20 PROCEDURE — 36415 COLL VENOUS BLD VENIPUNCTURE: CPT

## 2023-03-20 PROCEDURE — 80164 ASSAY DIPROPYLACETIC ACD TOT: CPT

## 2023-03-20 PROCEDURE — 84443 ASSAY THYROID STIM HORMONE: CPT

## 2023-03-20 PROCEDURE — 83036 HEMOGLOBIN GLYCOSYLATED A1C: CPT

## 2023-03-20 PROCEDURE — 84520 ASSAY OF UREA NITROGEN: CPT

## 2023-03-20 PROCEDURE — G0103 PSA SCREENING: HCPCS

## 2023-03-20 PROCEDURE — 84460 ALANINE AMINO (ALT) (SGPT): CPT

## 2023-03-20 PROCEDURE — 82565 ASSAY OF CREATININE: CPT

## 2023-11-30 ENCOUNTER — HOSPITAL ENCOUNTER (OUTPATIENT)
Age: 68
Discharge: HOME | End: 2023-11-30
Payer: COMMERCIAL

## 2023-11-30 LAB
ALBUMIN SERPL-MCNC: 4.3 GM/DL (ref 3.4–5)
ALP BLD-CCNC: 85 IU/L (ref 40–129)
ALT SERPL-CCNC: 12 U/L (ref 10–40)
ANION GAP SERPL CALCULATED.3IONS-SCNC: 14 MMOL/L (ref 4–16)
AST SERPL-CCNC: 17 IU/L (ref 15–37)
BASOPHILS ABSOLUTE: 0.1 K/CU MM
BASOPHILS RELATIVE PERCENT: 0.5 % (ref 0–1)
BILIRUB SERPL-MCNC: 0.4 MG/DL (ref 0–1)
BUN SERPL-MCNC: 12 MG/DL (ref 6–23)
CALCIUM SERPL-MCNC: 9.4 MG/DL (ref 8.3–10.6)
CHLORIDE BLD-SCNC: 103 MMOL/L (ref 99–110)
CO2: 25 MMOL/L (ref 21–32)
CREAT SERPL-MCNC: 1.1 MG/DL (ref 0.9–1.3)
DIFFERENTIAL TYPE: ABNORMAL
DOSE AMOUNT: NORMAL
DOSE TIME: NORMAL
EOSINOPHILS ABSOLUTE: 0.7 K/CU MM
EOSINOPHILS RELATIVE PERCENT: 6.6 % (ref 0–3)
GFR SERPL CREATININE-BSD FRML MDRD: >60 ML/MIN/1.73M2
GLUCOSE SERPL-MCNC: 87 MG/DL (ref 70–99)
HCT VFR BLD CALC: 45.9 % (ref 42–52)
HEMOGLOBIN: 14.7 GM/DL (ref 13.5–18)
IMMATURE NEUTROPHIL %: 0.7 % (ref 0–0.43)
LITHIUM LEVEL: 0.7 MMOL/L (ref 0.6–1.2)
LYMPHOCYTES ABSOLUTE: 1.5 K/CU MM
LYMPHOCYTES RELATIVE PERCENT: 14.3 % (ref 24–44)
MCH RBC QN AUTO: 30.7 PG (ref 27–31)
MCHC RBC AUTO-ENTMCNC: 32 % (ref 32–36)
MCV RBC AUTO: 95.8 FL (ref 78–100)
MONOCYTES ABSOLUTE: 0.8 K/CU MM
MONOCYTES RELATIVE PERCENT: 7.1 % (ref 0–4)
NUCLEATED RBC %: 0 %
PDW BLD-RTO: 14 % (ref 11.7–14.9)
PLATELET # BLD: 200 K/CU MM (ref 140–440)
PMV BLD AUTO: 10.7 FL (ref 7.5–11.1)
POTASSIUM SERPL-SCNC: 3.7 MMOL/L (ref 3.5–5.1)
RBC # BLD: 4.79 M/CU MM (ref 4.6–6.2)
SEGMENTED NEUTROPHILS ABSOLUTE COUNT: 7.5 K/CU MM
SEGMENTED NEUTROPHILS RELATIVE PERCENT: 70.8 % (ref 36–66)
SODIUM BLD-SCNC: 142 MMOL/L (ref 135–145)
TOTAL IMMATURE NEUTOROPHIL: 0.07 K/CU MM
TOTAL NUCLEATED RBC: 0 K/CU MM
TOTAL PROTEIN: 7.2 GM/DL (ref 6.4–8.2)
VALPROIC ACID LEVEL: 63.8 UG/ML (ref 50–100)
WBC # BLD: 10.6 K/CU MM (ref 4–10.5)

## 2023-11-30 PROCEDURE — 36415 COLL VENOUS BLD VENIPUNCTURE: CPT

## 2023-11-30 PROCEDURE — 80164 ASSAY DIPROPYLACETIC ACD TOT: CPT

## 2023-11-30 PROCEDURE — 80053 COMPREHEN METABOLIC PANEL: CPT

## 2023-11-30 PROCEDURE — 80178 ASSAY OF LITHIUM: CPT

## 2023-11-30 PROCEDURE — 85025 COMPLETE CBC W/AUTO DIFF WBC: CPT

## 2024-09-03 ENCOUNTER — HOSPITAL ENCOUNTER (EMERGENCY)
Age: 69
Discharge: PSYCHIATRIC OR SUBSTANCE USE FACILITY | End: 2024-09-04
Attending: EMERGENCY MEDICINE
Payer: MEDICARE

## 2024-09-03 DIAGNOSIS — F30.10 MANIC BEHAVIOR (HCC): Primary | ICD-10-CM

## 2024-09-03 LAB
ACETAMINOPHEN LEVEL: <5 UG/ML (ref 15–30)
ALBUMIN SERPL-MCNC: 3.8 GM/DL (ref 3.4–5)
ALCOHOL SCREEN SERUM: <0.01 %WT/VOL
ALP BLD-CCNC: 111 IU/L (ref 40–129)
ALT SERPL-CCNC: 16 U/L (ref 10–40)
AMPHETAMINES: NEGATIVE
ANION GAP SERPL CALCULATED.3IONS-SCNC: 7 MMOL/L (ref 7–16)
AST SERPL-CCNC: 23 IU/L (ref 15–37)
BARBITURATE SCREEN URINE: ABNORMAL
BASOPHILS ABSOLUTE: 0.1 K/CU MM
BASOPHILS RELATIVE PERCENT: 0.5 % (ref 0–1)
BENZODIAZEPINE SCREEN, URINE: NEGATIVE
BILIRUB SERPL-MCNC: 0.3 MG/DL (ref 0–1)
BUN SERPL-MCNC: 14 MG/DL (ref 6–23)
CALCIUM SERPL-MCNC: 9.2 MG/DL (ref 8.3–10.6)
CANNABINOID SCREEN URINE: NEGATIVE
CHLORIDE BLD-SCNC: 105 MMOL/L (ref 99–110)
CO2: 29 MMOL/L (ref 21–32)
COCAINE METABOLITE: NEGATIVE
CREAT SERPL-MCNC: 0.9 MG/DL (ref 0.9–1.3)
DIFFERENTIAL TYPE: ABNORMAL
DOSE AMOUNT: ABNORMAL
DOSE TIME: ABNORMAL
EOSINOPHILS ABSOLUTE: 0.4 K/CU MM
EOSINOPHILS RELATIVE PERCENT: 3.3 % (ref 0–3)
FENTANYL URINE: NEGATIVE
GFR, ESTIMATED: >90 ML/MIN/1.73M2
GLUCOSE SERPL-MCNC: 93 MG/DL (ref 70–99)
HCT VFR BLD CALC: 42.7 % (ref 42–52)
HEMOGLOBIN: 13.8 GM/DL (ref 13.5–18)
IMMATURE NEUTROPHIL %: 0.5 % (ref 0–0.43)
LITHIUM LEVEL: 0.6 MMOL/L (ref 0.6–1.2)
LYMPHOCYTES ABSOLUTE: 1 K/CU MM
LYMPHOCYTES RELATIVE PERCENT: 9.4 % (ref 24–44)
MCH RBC QN AUTO: 31.2 PG (ref 27–31)
MCHC RBC AUTO-ENTMCNC: 32.3 % (ref 32–36)
MCV RBC AUTO: 96.6 FL (ref 78–100)
MONOCYTES ABSOLUTE: 0.8 K/CU MM
MONOCYTES RELATIVE PERCENT: 7.4 % (ref 0–4)
NEUTROPHILS ABSOLUTE: 8.4 K/CU MM
NEUTROPHILS RELATIVE PERCENT: 78.9 % (ref 36–66)
NUCLEATED RBC %: 0 %
OPIATES, URINE: NEGATIVE
OXYCODONE: NEGATIVE
PDW BLD-RTO: 13.9 % (ref 11.7–14.9)
PLATELET # BLD: 210 K/CU MM (ref 140–440)
PMV BLD AUTO: 9.5 FL (ref 7.5–11.1)
POTASSIUM SERPL-SCNC: 3.9 MMOL/L (ref 3.5–5.1)
RBC # BLD: 4.42 M/CU MM (ref 4.6–6.2)
SALICYLATE LEVEL: <0.3 MG/DL (ref 15–30)
SODIUM BLD-SCNC: 141 MMOL/L (ref 135–145)
TOTAL IMMATURE NEUTOROPHIL: 0.05 K/CU MM
TOTAL NUCLEATED RBC: 0 K/CU MM
TOTAL PROTEIN: 7.5 GM/DL (ref 6.4–8.2)
VALPROIC ACID LEVEL: 26.7 UG/ML (ref 50–100)
WBC # BLD: 10.6 K/CU MM (ref 4–10.5)

## 2024-09-03 PROCEDURE — 80178 ASSAY OF LITHIUM: CPT

## 2024-09-03 PROCEDURE — 80307 DRUG TEST PRSMV CHEM ANLYZR: CPT

## 2024-09-03 PROCEDURE — 80053 COMPREHEN METABOLIC PANEL: CPT

## 2024-09-03 PROCEDURE — G0480 DRUG TEST DEF 1-7 CLASSES: HCPCS

## 2024-09-03 PROCEDURE — 80164 ASSAY DIPROPYLACETIC ACD TOT: CPT

## 2024-09-03 PROCEDURE — 99285 EMERGENCY DEPT VISIT HI MDM: CPT

## 2024-09-03 PROCEDURE — 85025 COMPLETE CBC W/AUTO DIFF WBC: CPT

## 2024-09-03 ASSESSMENT — PAIN SCALES - GENERAL: PAINLEVEL_OUTOF10: 0

## 2024-09-03 ASSESSMENT — PAIN - FUNCTIONAL ASSESSMENT: PAIN_FUNCTIONAL_ASSESSMENT: NONE - DENIES PAIN
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
## 2024-09-04 VITALS
OXYGEN SATURATION: 99 % | RESPIRATION RATE: 18 BRPM | BODY MASS INDEX: 38.65 KG/M2 | HEIGHT: 70 IN | WEIGHT: 270 LBS | TEMPERATURE: 97.6 F | HEART RATE: 63 BPM | SYSTOLIC BLOOD PRESSURE: 149 MMHG | DIASTOLIC BLOOD PRESSURE: 84 MMHG
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
## 2024-09-11 ENCOUNTER — CLINICAL DOCUMENTATION (OUTPATIENT)
Dept: INTERNAL MEDICINE CLINIC | Age: 69
End: 2024-09-11

## 2024-09-16 ENCOUNTER — HOSPITAL ENCOUNTER (OUTPATIENT)
Age: 69
Setting detail: SPECIMEN
Discharge: HOME | End: 2024-09-16

## 2024-09-16 LAB
DATE LAST DOSE: NORMAL
LITHIUM DATE LAST DOSE: NORMAL
LITHIUM DOSE AMOUNT: NORMAL
LITHIUM DOSE TIME: NORMAL
LITHIUM LEVEL: 0.7 MMOL/L (ref 0.6–1.2)
TME LAST DOSE: NORMAL H
VALPROATE SERPL-MCNC: 73 UG/ML (ref 50–100)
VANCOMYCIN DOSE: NORMAL MG

## 2024-09-16 PROCEDURE — 80164 ASSAY DIPROPYLACETIC ACD TOT: CPT

## 2024-09-16 PROCEDURE — 80178 ASSAY OF LITHIUM: CPT

## 2024-09-16 PROCEDURE — 36415 COLL VENOUS BLD VENIPUNCTURE: CPT

## 2024-10-15 ENCOUNTER — APPOINTMENT (OUTPATIENT)
Dept: GENERAL RADIOLOGY | Age: 69
DRG: 885 | End: 2024-10-15
Payer: MEDICARE

## 2024-10-15 ENCOUNTER — HOSPITAL ENCOUNTER (INPATIENT)
Age: 69
LOS: 2 days | Discharge: OTHER INSTITUTION | DRG: 885 | End: 2024-10-20
Attending: STUDENT IN AN ORGANIZED HEALTH CARE EDUCATION/TRAINING PROGRAM | Admitting: HOSPITALIST
Payer: MEDICARE
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
## 2024-10-15 DIAGNOSIS — M25.471 PAIN AND SWELLING OF RIGHT ANKLE: ICD-10-CM

## 2024-10-15 DIAGNOSIS — I89.0 LYMPHEDEMA: ICD-10-CM

## 2024-10-15 DIAGNOSIS — R26.2 DIFFICULTY WALKING: Primary | ICD-10-CM

## 2024-10-15 DIAGNOSIS — M25.571 PAIN AND SWELLING OF RIGHT ANKLE: ICD-10-CM

## 2024-10-15 DIAGNOSIS — S93.104A: ICD-10-CM

## 2024-10-15 PROBLEM — R53.81 DEBILITY: Status: ACTIVE | Noted: 2024-10-15

## 2024-10-15 LAB
ALBUMIN SERPL-MCNC: 3.3 G/DL (ref 3.4–5)
ALBUMIN/GLOB SERPL: 1.1 {RATIO} (ref 1.1–2.2)
ALP SERPL-CCNC: 93 U/L (ref 40–129)
ALT SERPL-CCNC: 111 U/L (ref 10–40)
ANION GAP SERPL CALCULATED.3IONS-SCNC: 11 MMOL/L (ref 9–17)
AST SERPL-CCNC: 108 U/L (ref 15–37)
BASOPHILS # BLD: 0.03 K/UL
BASOPHILS NFR BLD: 0 % (ref 0–1)
BILIRUB SERPL-MCNC: 0.6 MG/DL (ref 0–1)
BILIRUB UR QL STRIP: NEGATIVE
BUN SERPL-MCNC: 13 MG/DL (ref 7–20)
CALCIUM SERPL-MCNC: 8.9 MG/DL (ref 8.3–10.6)
CHLORIDE SERPL-SCNC: 102 MMOL/L (ref 99–110)
CK SERPL-CCNC: 264 U/L (ref 26–192)
CLARITY UR: CLEAR
CO2 SERPL-SCNC: 25 MMOL/L (ref 21–32)
COLOR UR: YELLOW
CREAT SERPL-MCNC: 0.8 MG/DL (ref 0.8–1.3)
EOSINOPHIL # BLD: 0.09 K/UL
EOSINOPHILS RELATIVE PERCENT: 1 % (ref 0–3)
ERYTHROCYTE [DISTWIDTH] IN BLOOD BY AUTOMATED COUNT: 13.6 % (ref 11.7–14.9)
GFR, ESTIMATED: >90 ML/MIN/1.73M2
GLUCOSE SERPL-MCNC: 108 MG/DL (ref 74–99)
GLUCOSE UR STRIP-MCNC: NEGATIVE MG/DL
HCT VFR BLD AUTO: 38.2 % (ref 42–52)
HGB BLD-MCNC: 12 G/DL (ref 13.5–18)
HGB UR QL STRIP.AUTO: ABNORMAL
IMM GRANULOCYTES # BLD AUTO: 0.03 K/UL
IMM GRANULOCYTES NFR BLD: 0 %
KETONES UR STRIP-MCNC: NEGATIVE MG/DL
LEUKOCYTE ESTERASE UR QL STRIP: NEGATIVE
LYMPHOCYTES NFR BLD: 0.6 K/UL
LYMPHOCYTES RELATIVE PERCENT: 7 % (ref 24–44)
MCH RBC QN AUTO: 29.9 PG (ref 27–31)
MCHC RBC AUTO-ENTMCNC: 31.4 G/DL (ref 32–36)
MCV RBC AUTO: 95.3 FL (ref 78–100)
MONOCYTES NFR BLD: 0.7 K/UL
MONOCYTES NFR BLD: 8 % (ref 0–4)
MUCOUS THREADS URNS QL MICRO: ABNORMAL
NEUTROPHILS NFR BLD: 84 % (ref 36–66)
NEUTS SEG NFR BLD: 7.4 K/UL
NITRITE UR QL STRIP: NEGATIVE
PH UR STRIP: 6 [PH] (ref 5–8)
PLATELET # BLD AUTO: 247 K/UL (ref 140–440)
PMV BLD AUTO: 8.9 FL (ref 7.5–11.1)
POTASSIUM SERPL-SCNC: 3.9 MMOL/L (ref 3.5–5.1)
PROT SERPL-MCNC: 6.5 G/DL (ref 6.4–8.2)
PROT UR STRIP-MCNC: NEGATIVE MG/DL
RBC # BLD AUTO: 4.01 M/UL (ref 4.6–6.2)
RBC #/AREA URNS HPF: 4 /HPF (ref 0–2)
SODIUM SERPL-SCNC: 137 MMOL/L (ref 136–145)
SP GR UR STRIP: 1.02 (ref 1–1.03)
TRICHOMONAS #/AREA URNS HPF: ABNORMAL /[HPF]
UROBILINOGEN UR STRIP-ACNC: 0.2 EU/DL (ref 0–1)
WBC #/AREA URNS HPF: <1 /HPF (ref 0–5)
WBC OTHER # BLD: 8.9 K/UL (ref 4–10.5)

## 2024-10-15 PROCEDURE — 81001 URINALYSIS AUTO W/SCOPE: CPT

## 2024-10-15 PROCEDURE — 2580000003 HC RX 258: Performed by: STUDENT IN AN ORGANIZED HEALTH CARE EDUCATION/TRAINING PROGRAM

## 2024-10-15 PROCEDURE — 82550 ASSAY OF CK (CPK): CPT

## 2024-10-15 PROCEDURE — 99285 EMERGENCY DEPT VISIT HI MDM: CPT

## 2024-10-15 PROCEDURE — 73630 X-RAY EXAM OF FOOT: CPT

## 2024-10-15 PROCEDURE — 85025 COMPLETE CBC W/AUTO DIFF WBC: CPT

## 2024-10-15 PROCEDURE — 96372 THER/PROPH/DIAG INJ SC/IM: CPT

## 2024-10-15 PROCEDURE — G0378 HOSPITAL OBSERVATION PER HR: HCPCS

## 2024-10-15 PROCEDURE — 6360000002 HC RX W HCPCS: Performed by: PHYSICIAN ASSISTANT

## 2024-10-15 PROCEDURE — 90715 TDAP VACCINE 7 YRS/> IM: CPT | Performed by: PHYSICIAN ASSISTANT

## 2024-10-15 PROCEDURE — 73610 X-RAY EXAM OF ANKLE: CPT

## 2024-10-15 PROCEDURE — 6370000000 HC RX 637 (ALT 250 FOR IP): Performed by: STUDENT IN AN ORGANIZED HEALTH CARE EDUCATION/TRAINING PROGRAM

## 2024-10-15 PROCEDURE — 90471 IMMUNIZATION ADMIN: CPT | Performed by: PHYSICIAN ASSISTANT

## 2024-10-15 PROCEDURE — 80053 COMPREHEN METABOLIC PANEL: CPT

## 2024-10-15 RX ORDER — FUROSEMIDE 40 MG/1
40 TABLET ORAL 2 TIMES DAILY
Status: DISCONTINUED | OUTPATIENT
Start: 2024-10-15 | End: 2024-10-20 | Stop reason: HOSPADM

## 2024-10-15 RX ORDER — ONDANSETRON 4 MG/1
4 TABLET, ORALLY DISINTEGRATING ORAL EVERY 8 HOURS PRN
Status: DISCONTINUED | OUTPATIENT
Start: 2024-10-15 | End: 2024-10-20 | Stop reason: HOSPADM

## 2024-10-15 RX ORDER — ACETAMINOPHEN 325 MG/1
650 TABLET ORAL EVERY 6 HOURS PRN
Status: DISCONTINUED | OUTPATIENT
Start: 2024-10-15 | End: 2024-10-20 | Stop reason: HOSPADM

## 2024-10-15 RX ORDER — SODIUM CHLORIDE 9 MG/ML
INJECTION, SOLUTION INTRAVENOUS PRN
Status: DISCONTINUED | OUTPATIENT
Start: 2024-10-15 | End: 2024-10-20 | Stop reason: HOSPADM

## 2024-10-15 RX ORDER — CEPHALEXIN 500 MG/1
500 CAPSULE ORAL 4 TIMES DAILY
Qty: 20 CAPSULE | Refills: 0 | Status: SHIPPED | OUTPATIENT
Start: 2024-10-15 | End: 2024-10-20

## 2024-10-15 RX ORDER — PRIMIDONE 50 MG/1
100 TABLET ORAL 2 TIMES DAILY
COMMUNITY

## 2024-10-15 RX ORDER — LITHIUM CARBONATE 300 MG
300 TABLET ORAL 2 TIMES DAILY
Status: DISCONTINUED | OUTPATIENT
Start: 2024-10-15 | End: 2024-10-16

## 2024-10-15 RX ORDER — SODIUM CHLORIDE 0.9 % (FLUSH) 0.9 %
5-40 SYRINGE (ML) INJECTION EVERY 12 HOURS SCHEDULED
Status: DISCONTINUED | OUTPATIENT
Start: 2024-10-15 | End: 2024-10-20 | Stop reason: HOSPADM

## 2024-10-15 RX ORDER — POTASSIUM CHLORIDE 7.45 MG/ML
10 INJECTION INTRAVENOUS PRN
Status: DISCONTINUED | OUTPATIENT
Start: 2024-10-15 | End: 2024-10-20 | Stop reason: HOSPADM

## 2024-10-15 RX ORDER — CARVEDILOL 6.25 MG/1
3.12 TABLET ORAL 2 TIMES DAILY WITH MEALS
Status: DISCONTINUED | OUTPATIENT
Start: 2024-10-15 | End: 2024-10-16

## 2024-10-15 RX ORDER — DIVALPROEX SODIUM 250 MG/1
500 TABLET, DELAYED RELEASE ORAL 2 TIMES DAILY
Status: DISCONTINUED | OUTPATIENT
Start: 2024-10-15 | End: 2024-10-16

## 2024-10-15 RX ORDER — QUETIAPINE FUMARATE 25 MG/1
50 TABLET, FILM COATED ORAL DAILY
COMMUNITY

## 2024-10-15 RX ORDER — MAGNESIUM SULFATE IN WATER 40 MG/ML
2000 INJECTION, SOLUTION INTRAVENOUS PRN
Status: DISCONTINUED | OUTPATIENT
Start: 2024-10-15 | End: 2024-10-20 | Stop reason: HOSPADM

## 2024-10-15 RX ORDER — SODIUM CHLORIDE 0.9 % (FLUSH) 0.9 %
5-40 SYRINGE (ML) INJECTION PRN
Status: DISCONTINUED | OUTPATIENT
Start: 2024-10-15 | End: 2024-10-20 | Stop reason: HOSPADM

## 2024-10-15 RX ORDER — ACETAMINOPHEN 650 MG/1
650 SUPPOSITORY RECTAL EVERY 6 HOURS PRN
Status: DISCONTINUED | OUTPATIENT
Start: 2024-10-15 | End: 2024-10-20 | Stop reason: HOSPADM

## 2024-10-15 RX ORDER — POTASSIUM CHLORIDE 750 MG/1
10 TABLET, EXTENDED RELEASE ORAL DAILY
Status: DISCONTINUED | OUTPATIENT
Start: 2024-10-15 | End: 2024-10-20 | Stop reason: HOSPADM

## 2024-10-15 RX ORDER — POTASSIUM CHLORIDE 1500 MG/1
40 TABLET, EXTENDED RELEASE ORAL PRN
Status: DISCONTINUED | OUTPATIENT
Start: 2024-10-15 | End: 2024-10-20 | Stop reason: HOSPADM

## 2024-10-15 RX ORDER — ASPIRIN 81 MG/1
81 TABLET, CHEWABLE ORAL DAILY
Status: DISCONTINUED | OUTPATIENT
Start: 2024-10-16 | End: 2024-10-20 | Stop reason: HOSPADM

## 2024-10-15 RX ORDER — ONDANSETRON 2 MG/ML
4 INJECTION INTRAMUSCULAR; INTRAVENOUS EVERY 6 HOURS PRN
Status: DISCONTINUED | OUTPATIENT
Start: 2024-10-15 | End: 2024-10-20 | Stop reason: HOSPADM

## 2024-10-15 RX ORDER — POLYETHYLENE GLYCOL 3350 17 G/17G
17 POWDER, FOR SOLUTION ORAL DAILY PRN
Status: DISCONTINUED | OUTPATIENT
Start: 2024-10-15 | End: 2024-10-20 | Stop reason: HOSPADM

## 2024-10-15 RX ORDER — DOCUSATE SODIUM 100 MG/1
100 CAPSULE, LIQUID FILLED ORAL 2 TIMES DAILY
COMMUNITY

## 2024-10-15 RX ADMIN — APIXABAN 5 MG: 5 TABLET, FILM COATED ORAL at 23:54

## 2024-10-15 RX ADMIN — TETANUS TOXOID, REDUCED DIPHTHERIA TOXOID AND ACELLULAR PERTUSSIS VACCINE, ADSORBED 0.5 ML: 5; 2.5; 8; 8; 2.5 SUSPENSION INTRAMUSCULAR at 13:04

## 2024-10-15 RX ADMIN — SODIUM CHLORIDE, PRESERVATIVE FREE 10 ML: 5 INJECTION INTRAVENOUS at 22:43

## 2024-10-15 ASSESSMENT — PAIN - FUNCTIONAL ASSESSMENT: PAIN_FUNCTIONAL_ASSESSMENT: 0-10

## 2024-10-15 ASSESSMENT — LIFESTYLE VARIABLES
HOW MANY STANDARD DRINKS CONTAINING ALCOHOL DO YOU HAVE ON A TYPICAL DAY: PATIENT DOES NOT DRINK
HOW OFTEN DO YOU HAVE A DRINK CONTAINING ALCOHOL: NEVER

## 2024-10-15 ASSESSMENT — PAIN DESCRIPTION - LOCATION: LOCATION: ANKLE;KNEE

## 2024-10-15 ASSESSMENT — PAIN SCALES - GENERAL: PAINLEVEL_OUTOF10: 1

## 2024-10-15 ASSESSMENT — PAIN DESCRIPTION - DESCRIPTORS: DESCRIPTORS: ACHING

## 2024-10-15 NOTE — ED NOTES
Called lab and spoke w/ Bernardo r/t pt's urine. Informed bernardo specimen was sent at approximately 1232pm. Bernardo located specimen and stated they would start processing order.

## 2024-10-15 NOTE — H&P
V2.0  History and Physical      Name:  Jhonatan Cameron /Age/Sex: 1955  (69 y.o. male)   MRN & CSN:  [id number] & [id number] Encounter Date/Time: 10/15/2024 6:06 PM EDT   Location:  ED20/ED-20 PCP: Rachel Hancock, EMPERATRIZ Rios CNP       Hospital Day: 1    Assessment and Plan:   Jhonatan Cameron is a 69 y.o. male who presents with Debility    Hospital Problems             Last Modified POA    * (Principal) Debility 10/15/2024 Yes       Plan:    Right foot pain likely from sprain   Difficulty with ambulation due to pain  Xray showed no acute fracture but has severe OA  CM on board for setting up assisted living. He will also need walker on DC   CK mildly elevated - Encouraged for oral hydration     Hx of DVT and IVC filter   On elqiuis     HLD   On statin     HTN   On coreg, aspirin     OA   Tylenol PRN    COPD   Stable      Leg edema  Lymphedema    Patient taking Lasix with KCL at home. Currently held due to concern for dehydration.     Disposition:   Current Living situation: assisted living  Expected Disposition:  assisted living  Estimated D/C: 1 day    Diet No diet orders on file   DVT Prophylaxis [] Lovenox, []  Heparin, [] SCDs, [] Ambulation,  [x] Eliquis, [] Xarelto, [] Coumadin   Code Status Prior   Surrogate Decision Maker/ POA      Personally reviewed Lab Studies and Imaging     Discussed management of the case with ER provider who recommended admission     Imaging that was interpreted personally includes right foot xray and results sever OA        History from:     patient    History of Present Illness:     Chief Complaint: right foot pain  Jhonatan Cameron is a 69 y.o. male who presents with right foot pain  causing him difficulty to walk. Patient has hx of polyneuropathy and lymphedema. He uses walker but apparently it broke and he fell down.  Xray right foot and ankle showed soft tissue swelling likely related to OA. No acute fracture.  Plan was to DC him from ED however since DME is not open right

## 2024-10-15 NOTE — DISCHARGE INSTRUCTIONS
Follow-up with your primary care provider and if needed orthopedic provider for reevaluation of your ankle and foot pain, and glass injury  and discuss results of your x-ray.    X-ray does show bone spur over your heel, arthritis, as well as a dislocated fifth toe that appears chronic and previously seen on order x-rays.    Follow-up with your primary care provider for reevaluation of your foot, leg swelling or lymphedema and to discuss your visit to the emergency department.       Return with any fevers, difficulty breathing, chest pain, if your urine turns dark brown, muscle aches, redness, worsening symptoms or any new concerns    Your liver enzymes were noted to be improving, but should be rechecked in 1-2 weeks. Follow-up at the GI clinic to discuss results of further liver testing conducted in the hospital.

## 2024-10-15 NOTE — ED NOTES
ED TO INPATIENT SBAR HANDOFF    Patient Name: Jhonatan Cameron   :  1955  69 y.o.   Preferred Name    Family/Caregiver Present no   Restraints no   C-SSRS: Risk of Suicide: No Risk  Sitter no   Sepsis Risk Score        Situation  Chief Complaint   Patient presents with    Fall    Ankle Pain    Knee Pain     Brief Description of Patient's Condition: pt arrives to ED from EMS w/ c/o recent fall and R ankle and knee pain from fall. Pt reports also being evicted from apartment and unable to find proper/safe housing d/t medical issues. Pt states they can walk but originally called EMS for falling and not being able to get up and walk. Pt is alert and oriented to time, situation, place and person. Pt is cooperative at baseline w/o issues noted.   Mental Status: oriented, alert, coherent, and thought processes intact  Arrived from: home    Imaging:   XR ANKLE RIGHT (MIN 3 VIEWS)   Final Result      Prominent soft tissue swelling is seen, particularly over the lateral malleolus.   Mild to moderate degenerative changes are seen throughout the ankle joint, with   cortical irregularity and joint space narrowing most prominent medially and   laterally.      A large inferior calcaneal spur is noted.      Mild degenerative changes are seen throughout the visualized hindfoot and   midfoot.      A dislocated right fifth metatarsal phalangeal joint is noted, with mild   override present.      Mild degenerative changes are seen at the toes and first metatarsophalangeal   joint.      No acute fracture site is seen.   If clinical concern persists, short-term follow-up imaging may be performed to   rule out a currently occult fracture.      Electronically signed by Terrence Pompa MD      XR FOOT RIGHT (MIN 3 VIEWS)   Final Result      Prominent soft tissue swelling is seen, particularly over the lateral malleolus.   Mild to moderate degenerative changes are seen throughout the ankle joint, with   cortical irregularity and joint

## 2024-10-15 NOTE — ED TRIAGE NOTES
Pt arrived to ED via EMS. Pt stating \"I do not have any emergent needs at this time and just saw my NP\". This RN asked pt the reason for their ER visit. Pt stating they had a fall last night and could not get up so they grabbed couch cushions and slept on floor. Stating their right ankle has been hurting with 1/10 pain from lymphedema and unable to bend right knee at baseline.

## 2024-10-15 NOTE — ED PROVIDER NOTES
[facility] EMERGENCY DEPARTMENT  EMERGENCY DEPARTMENT ENCOUNTER        Pt Name: Jhonatan Cameron  MRN: [id number]  Birthdate 1955  Date of evaluation: 10/15/2024  Provider: Young Hollis PA-C  PCP: Rachel Hancock, EMPERATRIZ - CNP      MASSIEL. I have evaluated this patient.        CHIEF COMPLAINT      Chief Complaint   Patient presents with    Fall    Ankle Pain    Knee Pain       HISTORY OF PRESENT ILLNESS:     History from : Patient    Limitations to history : None    Jhonatan Cameron is a 69 y.o. male with past medical history of lymphedema, neuropathy who presents with c/o right foot pain x5 days , had been having issues with walking because of this.  Discussion, he feels that he might of injured it on broken glass.  He states typically uses a roller  walker which we will had broken while he was using it last night.  Was able to ease himself down to the ground and grab a couch cushion which he stayed on all night long because he could not get up .  Denies any confusion, head injury, worsening neck or back pain, loss of consciousness, urinary changes, abdominal pain, nausea, vomiting      Nursing Notes were all reviewed and agreed with or any disagreements were addressed in the HPI.    REVIEW OF SYSTEMS :     Review of Systems   All other systems reviewed and are negative.      Pertinent positives and negatives are stated within HPI    PAST HISTORY   has a past medical history of Arthritis, Bipolar 1 disorder ([facility]), COPD (chronic obstructive pulmonary disease) ([facility]), Hx of blood clots, Hyperlipidemia, Hypertension, Neuropathy, Schizo affective schizophrenia ([facility]), Tremors of nervous system, WD-Arterial insufficiency of lower extremity (HCC), WD-Cellulitis of left lower leg, WD-Venous insufficiency of both lower extremities, and WDCellulitis of leg, right.    Past Surgical History:   Procedure Laterality Date    COLONOSCOPY  last one 2012    DENTAL SURGERY      \"wisdom

## 2024-10-16 ENCOUNTER — APPOINTMENT (OUTPATIENT)
Dept: GENERAL RADIOLOGY | Age: 69
DRG: 885 | End: 2024-10-16
Payer: MEDICARE

## 2024-10-16 LAB
ALBUMIN SERPL-MCNC: 2.8 G/DL (ref 3.4–5)
ALBUMIN/GLOB SERPL: 0.8 {RATIO} (ref 1.1–2.2)
ALP SERPL-CCNC: 85 U/L (ref 40–129)
ALT SERPL-CCNC: 126 U/L (ref 10–40)
ANION GAP SERPL CALCULATED.3IONS-SCNC: 9 MMOL/L (ref 9–17)
ANION GAP SERPL CALCULATED.3IONS-SCNC: 9 MMOL/L (ref 9–17)
AST SERPL-CCNC: 144 U/L (ref 15–37)
BASOPHILS # BLD: 0.02 K/UL
BASOPHILS NFR BLD: 0 % (ref 0–1)
BILIRUB SERPL-MCNC: 0.5 MG/DL (ref 0–1)
BUN SERPL-MCNC: 12 MG/DL (ref 7–20)
BUN SERPL-MCNC: 13 MG/DL (ref 7–20)
CALCIUM SERPL-MCNC: 8.4 MG/DL (ref 8.3–10.6)
CALCIUM SERPL-MCNC: 8.7 MG/DL (ref 8.3–10.6)
CHLORIDE SERPL-SCNC: 103 MMOL/L (ref 99–110)
CHLORIDE SERPL-SCNC: 103 MMOL/L (ref 99–110)
CK SERPL-CCNC: 143 U/L (ref 26–192)
CO2 SERPL-SCNC: 24 MMOL/L (ref 21–32)
CO2 SERPL-SCNC: 24 MMOL/L (ref 21–32)
CREAT SERPL-MCNC: 0.7 MG/DL (ref 0.8–1.3)
CREAT SERPL-MCNC: 0.8 MG/DL (ref 0.8–1.3)
EOSINOPHIL # BLD: 0.22 K/UL
EOSINOPHILS RELATIVE PERCENT: 3 % (ref 0–3)
ERYTHROCYTE [DISTWIDTH] IN BLOOD BY AUTOMATED COUNT: 13.5 % (ref 11.7–14.9)
GFR, ESTIMATED: >90 ML/MIN/1.73M2
GFR, ESTIMATED: >90 ML/MIN/1.73M2
GLUCOSE SERPL-MCNC: 104 MG/DL (ref 74–99)
GLUCOSE SERPL-MCNC: 152 MG/DL (ref 74–99)
HAV IGM SERPL QL IA: NONREACTIVE
HBV CORE IGM SERPL QL IA: NONREACTIVE
HBV SURFACE AG SERPL QL IA: NONREACTIVE
HCT VFR BLD AUTO: 32.6 % (ref 42–52)
HCV AB SERPL QL IA: NONREACTIVE
HGB BLD-MCNC: 10.4 G/DL (ref 13.5–18)
IMM GRANULOCYTES # BLD AUTO: 0.03 K/UL
IMM GRANULOCYTES NFR BLD: 0 %
LITHIUM DATE LAST DOSE: ABNORMAL
LITHIUM DOSE AMOUNT: ABNORMAL
LITHIUM DOSE TIME: ABNORMAL
LITHIUM LEVEL: 0.4 MMOL/L (ref 0.6–1.2)
LYMPHOCYTES NFR BLD: 0.75 K/UL
LYMPHOCYTES RELATIVE PERCENT: 9 % (ref 24–44)
MCH RBC QN AUTO: 29.9 PG (ref 27–31)
MCHC RBC AUTO-ENTMCNC: 31.9 G/DL (ref 32–36)
MCV RBC AUTO: 93.7 FL (ref 78–100)
MONOCYTES NFR BLD: 0.7 K/UL
MONOCYTES NFR BLD: 9 % (ref 0–4)
NEUTROPHILS NFR BLD: 79 % (ref 36–66)
NEUTS SEG NFR BLD: 6.48 K/UL
PLATELET # BLD AUTO: 221 K/UL (ref 140–440)
PMV BLD AUTO: 9.1 FL (ref 7.5–11.1)
POTASSIUM SERPL-SCNC: 3.5 MMOL/L (ref 3.5–5.1)
POTASSIUM SERPL-SCNC: 3.7 MMOL/L (ref 3.5–5.1)
PROT SERPL-MCNC: 6.3 G/DL (ref 6.4–8.2)
RBC # BLD AUTO: 3.48 M/UL (ref 4.6–6.2)
SODIUM SERPL-SCNC: 135 MMOL/L (ref 136–145)
SODIUM SERPL-SCNC: 136 MMOL/L (ref 136–145)
WBC OTHER # BLD: 8.2 K/UL (ref 4–10.5)

## 2024-10-16 PROCEDURE — 6370000000 HC RX 637 (ALT 250 FOR IP): Performed by: STUDENT IN AN ORGANIZED HEALTH CARE EDUCATION/TRAINING PROGRAM

## 2024-10-16 PROCEDURE — 96374 THER/PROPH/DIAG INJ IV PUSH: CPT

## 2024-10-16 PROCEDURE — 97162 PT EVAL MOD COMPLEX 30 MIN: CPT

## 2024-10-16 PROCEDURE — 6370000000 HC RX 637 (ALT 250 FOR IP)

## 2024-10-16 PROCEDURE — 97530 THERAPEUTIC ACTIVITIES: CPT

## 2024-10-16 PROCEDURE — 80178 ASSAY OF LITHIUM: CPT

## 2024-10-16 PROCEDURE — 97166 OT EVAL MOD COMPLEX 45 MIN: CPT

## 2024-10-16 PROCEDURE — 6360000002 HC RX W HCPCS: Performed by: PHYSICIAN ASSISTANT

## 2024-10-16 PROCEDURE — 80053 COMPREHEN METABOLIC PANEL: CPT

## 2024-10-16 PROCEDURE — G0378 HOSPITAL OBSERVATION PER HR: HCPCS

## 2024-10-16 PROCEDURE — 80074 ACUTE HEPATITIS PANEL: CPT

## 2024-10-16 PROCEDURE — 80048 BASIC METABOLIC PNL TOTAL CA: CPT

## 2024-10-16 PROCEDURE — 82550 ASSAY OF CK (CPK): CPT

## 2024-10-16 PROCEDURE — 6370000000 HC RX 637 (ALT 250 FOR IP): Performed by: PHYSICIAN ASSISTANT

## 2024-10-16 PROCEDURE — 2580000003 HC RX 258: Performed by: STUDENT IN AN ORGANIZED HEALTH CARE EDUCATION/TRAINING PROGRAM

## 2024-10-16 PROCEDURE — 73560 X-RAY EXAM OF KNEE 1 OR 2: CPT

## 2024-10-16 PROCEDURE — 85025 COMPLETE CBC W/AUTO DIFF WBC: CPT

## 2024-10-16 PROCEDURE — 97116 GAIT TRAINING THERAPY: CPT

## 2024-10-16 PROCEDURE — 36415 COLL VENOUS BLD VENIPUNCTURE: CPT

## 2024-10-16 RX ORDER — CARVEDILOL 6.25 MG/1
6.25 TABLET ORAL 2 TIMES DAILY WITH MEALS
Status: DISCONTINUED | OUTPATIENT
Start: 2024-10-16 | End: 2024-10-20 | Stop reason: HOSPADM

## 2024-10-16 RX ORDER — CYCLOBENZAPRINE HCL 10 MG
10 TABLET ORAL 3 TIMES DAILY PRN
Status: DISCONTINUED | OUTPATIENT
Start: 2024-10-16 | End: 2024-10-17

## 2024-10-16 RX ORDER — PRIMIDONE 50 MG/1
100 TABLET ORAL 2 TIMES DAILY
Status: DISCONTINUED | OUTPATIENT
Start: 2024-10-16 | End: 2024-10-20 | Stop reason: HOSPADM

## 2024-10-16 RX ORDER — DIVALPROEX SODIUM 500 MG/1
2000 TABLET, FILM COATED, EXTENDED RELEASE ORAL NIGHTLY
Status: DISCONTINUED | OUTPATIENT
Start: 2024-10-16 | End: 2024-10-20 | Stop reason: HOSPADM

## 2024-10-16 RX ORDER — TRAZODONE HYDROCHLORIDE 50 MG/1
300 TABLET, FILM COATED ORAL NIGHTLY
Status: DISCONTINUED | OUTPATIENT
Start: 2024-10-16 | End: 2024-10-20 | Stop reason: HOSPADM

## 2024-10-16 RX ORDER — LITHIUM CARBONATE 300 MG/1
600 CAPSULE ORAL 2 TIMES DAILY
Status: DISCONTINUED | OUTPATIENT
Start: 2024-10-16 | End: 2024-10-20 | Stop reason: HOSPADM

## 2024-10-16 RX ORDER — KETOROLAC TROMETHAMINE 30 MG/ML
15 INJECTION, SOLUTION INTRAMUSCULAR; INTRAVENOUS ONCE
Status: COMPLETED | OUTPATIENT
Start: 2024-10-16 | End: 2024-10-16

## 2024-10-16 RX ADMIN — CYCLOBENZAPRINE 10 MG: 10 TABLET, FILM COATED ORAL at 09:01

## 2024-10-16 RX ADMIN — KETOROLAC TROMETHAMINE 15 MG: 30 INJECTION, SOLUTION INTRAMUSCULAR; INTRAVENOUS at 09:01

## 2024-10-16 RX ADMIN — TRAZODONE HYDROCHLORIDE 300 MG: 50 TABLET ORAL at 20:05

## 2024-10-16 RX ADMIN — LITHIUM CARBONATE 600 MG: 300 CAPSULE, GELATIN COATED ORAL at 20:05

## 2024-10-16 RX ADMIN — ACETAMINOPHEN 650 MG: 325 TABLET ORAL at 07:14

## 2024-10-16 RX ADMIN — PRIMIDONE 100 MG: 50 TABLET ORAL at 09:01

## 2024-10-16 RX ADMIN — APIXABAN 5 MG: 5 TABLET, FILM COATED ORAL at 20:05

## 2024-10-16 RX ADMIN — APIXABAN 5 MG: 5 TABLET, FILM COATED ORAL at 09:01

## 2024-10-16 RX ADMIN — CARVEDILOL 6.25 MG: 6.25 TABLET, FILM COATED ORAL at 09:01

## 2024-10-16 RX ADMIN — PRIMIDONE 100 MG: 50 TABLET ORAL at 20:05

## 2024-10-16 RX ADMIN — ASPIRIN 81 MG: 81 TABLET, CHEWABLE ORAL at 09:01

## 2024-10-16 RX ADMIN — LITHIUM CARBONATE 600 MG: 300 CAPSULE, GELATIN COATED ORAL at 09:01

## 2024-10-16 RX ADMIN — DIVALPROEX SODIUM 2000 MG: 500 TABLET, FILM COATED, EXTENDED RELEASE ORAL at 20:04

## 2024-10-16 RX ADMIN — SODIUM CHLORIDE, PRESERVATIVE FREE 10 ML: 5 INJECTION INTRAVENOUS at 09:02

## 2024-10-16 ASSESSMENT — PAIN DESCRIPTION - LOCATION
LOCATION: NECK
LOCATION: NECK

## 2024-10-16 ASSESSMENT — PAIN SCALES - GENERAL
PAINLEVEL_OUTOF10: 0
PAINLEVEL_OUTOF10: 0
PAINLEVEL_OUTOF10: 6
PAINLEVEL_OUTOF10: 2

## 2024-10-16 ASSESSMENT — PAIN DESCRIPTION - DESCRIPTORS
DESCRIPTORS: ACHING;DISCOMFORT
DESCRIPTORS: ACHING

## 2024-10-16 ASSESSMENT — PAIN - FUNCTIONAL ASSESSMENT
PAIN_FUNCTIONAL_ASSESSMENT: ACTIVITIES ARE NOT PREVENTED
PAIN_FUNCTIONAL_ASSESSMENT: PREVENTS OR INTERFERES SOME ACTIVE ACTIVITIES AND ADLS

## 2024-10-16 ASSESSMENT — PAIN DESCRIPTION - ORIENTATION
ORIENTATION: POSTERIOR
ORIENTATION: LEFT

## 2024-10-17 ENCOUNTER — APPOINTMENT (OUTPATIENT)
Dept: ULTRASOUND IMAGING | Age: 69
DRG: 885 | End: 2024-10-17
Payer: MEDICARE

## 2024-10-17 LAB
ALBUMIN SERPL-MCNC: 2.8 G/DL (ref 3.4–5)
ALBUMIN/GLOB SERPL: 1.1 {RATIO} (ref 1.1–2.2)
ALP SERPL-CCNC: 78 U/L (ref 40–129)
ALT SERPL-CCNC: 157 U/L (ref 10–40)
AMMONIA PLAS-SCNC: 64 UMOL/L (ref 16–60)
ANION GAP SERPL CALCULATED.3IONS-SCNC: 9 MMOL/L (ref 9–17)
AST SERPL-CCNC: 149 U/L (ref 15–37)
BASOPHILS # BLD: 0.04 K/UL
BASOPHILS NFR BLD: 1 % (ref 0–1)
BILIRUB SERPL-MCNC: 0.2 MG/DL (ref 0–1)
BUN SERPL-MCNC: 17 MG/DL (ref 7–20)
CALCIUM SERPL-MCNC: 8.4 MG/DL (ref 8.3–10.6)
CHLORIDE SERPL-SCNC: 105 MMOL/L (ref 99–110)
CO2 SERPL-SCNC: 24 MMOL/L (ref 21–32)
CREAT SERPL-MCNC: 0.9 MG/DL (ref 0.8–1.3)
EOSINOPHIL # BLD: 0.43 K/UL
EOSINOPHILS RELATIVE PERCENT: 6 % (ref 0–3)
ERYTHROCYTE [DISTWIDTH] IN BLOOD BY AUTOMATED COUNT: 13.7 % (ref 11.7–14.9)
GFR, ESTIMATED: 89 ML/MIN/1.73M2
GLUCOSE SERPL-MCNC: 122 MG/DL (ref 74–99)
HCT VFR BLD AUTO: 32.4 % (ref 42–52)
HGB BLD-MCNC: 10.4 G/DL (ref 13.5–18)
IMM GRANULOCYTES # BLD AUTO: 0.02 K/UL
IMM GRANULOCYTES NFR BLD: 0 %
LYMPHOCYTES NFR BLD: 1.19 K/UL
LYMPHOCYTES RELATIVE PERCENT: 17 % (ref 24–44)
MCH RBC QN AUTO: 30.6 PG (ref 27–31)
MCHC RBC AUTO-ENTMCNC: 32.1 G/DL (ref 32–36)
MCV RBC AUTO: 95.3 FL (ref 78–100)
MONOCYTES NFR BLD: 0.48 K/UL
MONOCYTES NFR BLD: 7 % (ref 0–4)
NEUTROPHILS NFR BLD: 69 % (ref 36–66)
NEUTS SEG NFR BLD: 4.91 K/UL
PLATELET # BLD AUTO: 224 K/UL (ref 140–440)
PMV BLD AUTO: 9 FL (ref 7.5–11.1)
POTASSIUM SERPL-SCNC: 4 MMOL/L (ref 3.5–5.1)
PROT SERPL-MCNC: 5.4 G/DL (ref 6.4–8.2)
RBC # BLD AUTO: 3.4 M/UL (ref 4.6–6.2)
SODIUM SERPL-SCNC: 138 MMOL/L (ref 136–145)
WBC OTHER # BLD: 7.1 K/UL (ref 4–10.5)

## 2024-10-17 PROCEDURE — 99221 1ST HOSP IP/OBS SF/LOW 40: CPT | Performed by: NURSE PRACTITIONER

## 2024-10-17 PROCEDURE — 6370000000 HC RX 637 (ALT 250 FOR IP): Performed by: STUDENT IN AN ORGANIZED HEALTH CARE EDUCATION/TRAINING PROGRAM

## 2024-10-17 PROCEDURE — 36415 COLL VENOUS BLD VENIPUNCTURE: CPT

## 2024-10-17 PROCEDURE — 80053 COMPREHEN METABOLIC PANEL: CPT

## 2024-10-17 PROCEDURE — 85025 COMPLETE CBC W/AUTO DIFF WBC: CPT

## 2024-10-17 PROCEDURE — 82140 ASSAY OF AMMONIA: CPT

## 2024-10-17 PROCEDURE — 93005 ELECTROCARDIOGRAM TRACING: CPT | Performed by: NURSE PRACTITIONER

## 2024-10-17 PROCEDURE — 6370000000 HC RX 637 (ALT 250 FOR IP): Performed by: PHYSICIAN ASSISTANT

## 2024-10-17 PROCEDURE — G0378 HOSPITAL OBSERVATION PER HR: HCPCS

## 2024-10-17 PROCEDURE — 76705 ECHO EXAM OF ABDOMEN: CPT

## 2024-10-17 PROCEDURE — 94761 N-INVAS EAR/PLS OXIMETRY MLT: CPT

## 2024-10-17 PROCEDURE — 6370000000 HC RX 637 (ALT 250 FOR IP)

## 2024-10-17 RX ORDER — QUETIAPINE FUMARATE 25 MG/1
50 TABLET, FILM COATED ORAL NIGHTLY
Status: DISCONTINUED | OUTPATIENT
Start: 2024-10-17 | End: 2024-10-17

## 2024-10-17 RX ORDER — LACTULOSE 10 G/15ML
20 SOLUTION ORAL ONCE
Status: COMPLETED | OUTPATIENT
Start: 2024-10-17 | End: 2024-10-17

## 2024-10-17 RX ORDER — QUETIAPINE FUMARATE 100 MG/1
100 TABLET, FILM COATED ORAL NIGHTLY
Status: DISCONTINUED | OUTPATIENT
Start: 2024-10-17 | End: 2024-10-20 | Stop reason: HOSPADM

## 2024-10-17 RX ADMIN — LITHIUM CARBONATE 600 MG: 300 CAPSULE, GELATIN COATED ORAL at 20:29

## 2024-10-17 RX ADMIN — ASPIRIN 81 MG: 81 TABLET, CHEWABLE ORAL at 09:04

## 2024-10-17 RX ADMIN — LACTULOSE 20 G: 20 SOLUTION ORAL at 16:08

## 2024-10-17 RX ADMIN — CYCLOBENZAPRINE 10 MG: 10 TABLET, FILM COATED ORAL at 03:45

## 2024-10-17 RX ADMIN — CARVEDILOL 6.25 MG: 6.25 TABLET, FILM COATED ORAL at 09:03

## 2024-10-17 RX ADMIN — PRIMIDONE 100 MG: 50 TABLET ORAL at 09:04

## 2024-10-17 RX ADMIN — TRAZODONE HYDROCHLORIDE 300 MG: 50 TABLET ORAL at 20:32

## 2024-10-17 RX ADMIN — LITHIUM CARBONATE 600 MG: 300 CAPSULE, GELATIN COATED ORAL at 09:04

## 2024-10-17 RX ADMIN — APIXABAN 5 MG: 5 TABLET, FILM COATED ORAL at 20:29

## 2024-10-17 RX ADMIN — APIXABAN 5 MG: 5 TABLET, FILM COATED ORAL at 09:04

## 2024-10-17 RX ADMIN — PRIMIDONE 100 MG: 50 TABLET ORAL at 20:28

## 2024-10-17 ASSESSMENT — PAIN SCALES - GENERAL
PAINLEVEL_OUTOF10: 2
PAINLEVEL_OUTOF10: 0

## 2024-10-17 ASSESSMENT — PAIN SCALES - WONG BAKER
WONGBAKER_NUMERICALRESPONSE: HURTS A LITTLE BIT
WONGBAKER_NUMERICALRESPONSE: HURTS A LITTLE BIT

## 2024-10-17 ASSESSMENT — PAIN DESCRIPTION - DESCRIPTORS
DESCRIPTORS: DISCOMFORT
DESCRIPTORS: DISCOMFORT

## 2024-10-17 ASSESSMENT — PAIN DESCRIPTION - ORIENTATION
ORIENTATION: MID
ORIENTATION: MID

## 2024-10-17 ASSESSMENT — PAIN DESCRIPTION - LOCATION
LOCATION: BACK
LOCATION: BACK

## 2024-10-17 ASSESSMENT — PAIN - FUNCTIONAL ASSESSMENT
PAIN_FUNCTIONAL_ASSESSMENT: ACTIVITIES ARE NOT PREVENTED
PAIN_FUNCTIONAL_ASSESSMENT: ACTIVITIES ARE NOT PREVENTED

## 2024-10-17 NOTE — PLAN OF CARE
Spoke to JULIAN Odonnell and NPO is set at this time. So do ultrasound at 9pm mahsa. JY [id number]    Pt on an adult regular diet - need to do NPO hours - yb 1203 hrs

## 2024-10-18 PROBLEM — F31.10 BIPOLAR I DISORDER WITH MANIA (HCC): Status: ACTIVE | Noted: 2024-10-18

## 2024-10-18 PROBLEM — R26.2 DIFFICULTY WALKING: Status: ACTIVE | Noted: 2024-10-18

## 2024-10-18 LAB
ALBUMIN SERPL-MCNC: 2.8 G/DL (ref 3.4–5)
ALBUMIN/GLOB SERPL: 0.8 {RATIO} (ref 1.1–2.2)
ALP SERPL-CCNC: 77 U/L (ref 40–129)
ALT SERPL-CCNC: 175 U/L (ref 10–40)
AMMONIA PLAS-SCNC: 38 UMOL/L (ref 16–60)
ANION GAP SERPL CALCULATED.3IONS-SCNC: 8 MMOL/L (ref 9–17)
AST SERPL-CCNC: 143 U/L (ref 15–37)
BILIRUB SERPL-MCNC: 0.3 MG/DL (ref 0–1)
BUN SERPL-MCNC: 11 MG/DL (ref 7–20)
CALCIUM SERPL-MCNC: 9 MG/DL (ref 8.3–10.6)
CHLORIDE SERPL-SCNC: 105 MMOL/L (ref 99–110)
CO2 SERPL-SCNC: 25 MMOL/L (ref 21–32)
CREAT SERPL-MCNC: 0.9 MG/DL (ref 0.8–1.3)
EKG ATRIAL RATE: 57 BPM
EKG DIAGNOSIS: NORMAL
EKG P AXIS: -4 DEGREES
EKG P-R INTERVAL: 174 MS
EKG Q-T INTERVAL: 446 MS
EKG QRS DURATION: 112 MS
EKG QTC CALCULATION (BAZETT): 434 MS
EKG R AXIS: -29 DEGREES
EKG T AXIS: 12 DEGREES
EKG VENTRICULAR RATE: 57 BPM
FERRITIN SERPL-MCNC: 300 NG/ML (ref 30–400)
GFR, ESTIMATED: 89 ML/MIN/1.73M2
GLUCOSE SERPL-MCNC: 97 MG/DL (ref 74–99)
POTASSIUM SERPL-SCNC: 4.2 MMOL/L (ref 3.5–5.1)
PROT SERPL-MCNC: 6.4 G/DL (ref 6.4–8.2)
SODIUM SERPL-SCNC: 138 MMOL/L (ref 136–145)

## 2024-10-18 PROCEDURE — 82784 ASSAY IGA/IGD/IGG/IGM EACH: CPT

## 2024-10-18 PROCEDURE — 6370000000 HC RX 637 (ALT 250 FOR IP): Performed by: PHYSICIAN ASSISTANT

## 2024-10-18 PROCEDURE — 83516 IMMUNOASSAY NONANTIBODY: CPT

## 2024-10-18 PROCEDURE — 99233 SBSQ HOSP IP/OBS HIGH 50: CPT | Performed by: NURSE PRACTITIONER

## 2024-10-18 PROCEDURE — 82728 ASSAY OF FERRITIN: CPT

## 2024-10-18 PROCEDURE — 97116 GAIT TRAINING THERAPY: CPT

## 2024-10-18 PROCEDURE — 82390 ASSAY OF CERULOPLASMIN: CPT

## 2024-10-18 PROCEDURE — 36415 COLL VENOUS BLD VENIPUNCTURE: CPT

## 2024-10-18 PROCEDURE — 6370000000 HC RX 637 (ALT 250 FOR IP): Performed by: STUDENT IN AN ORGANIZED HEALTH CARE EDUCATION/TRAINING PROGRAM

## 2024-10-18 PROCEDURE — 99222 1ST HOSP IP/OBS MODERATE 55: CPT | Performed by: INTERNAL MEDICINE

## 2024-10-18 PROCEDURE — 1200000000 HC SEMI PRIVATE

## 2024-10-18 PROCEDURE — 6370000000 HC RX 637 (ALT 250 FOR IP)

## 2024-10-18 PROCEDURE — 83550 IRON BINDING TEST: CPT

## 2024-10-18 PROCEDURE — 97530 THERAPEUTIC ACTIVITIES: CPT

## 2024-10-18 PROCEDURE — 97110 THERAPEUTIC EXERCISES: CPT

## 2024-10-18 PROCEDURE — 83540 ASSAY OF IRON: CPT

## 2024-10-18 PROCEDURE — 86038 ANTINUCLEAR ANTIBODIES: CPT

## 2024-10-18 PROCEDURE — 94761 N-INVAS EAR/PLS OXIMETRY MLT: CPT

## 2024-10-18 PROCEDURE — 93010 ELECTROCARDIOGRAM REPORT: CPT | Performed by: INTERNAL MEDICINE

## 2024-10-18 PROCEDURE — 86036 ANCA SCREEN EACH ANTIBODY: CPT

## 2024-10-18 PROCEDURE — 82140 ASSAY OF AMMONIA: CPT

## 2024-10-18 PROCEDURE — 80053 COMPREHEN METABOLIC PANEL: CPT

## 2024-10-18 RX ADMIN — FUROSEMIDE 40 MG: 40 TABLET ORAL at 17:31

## 2024-10-18 RX ADMIN — PRIMIDONE 100 MG: 50 TABLET ORAL at 09:03

## 2024-10-18 RX ADMIN — LITHIUM CARBONATE 600 MG: 300 CAPSULE, GELATIN COATED ORAL at 09:02

## 2024-10-18 RX ADMIN — ASPIRIN 81 MG: 81 TABLET, CHEWABLE ORAL at 09:01

## 2024-10-18 RX ADMIN — TRAZODONE HYDROCHLORIDE 300 MG: 50 TABLET ORAL at 21:05

## 2024-10-18 RX ADMIN — LITHIUM CARBONATE 600 MG: 300 CAPSULE, GELATIN COATED ORAL at 21:06

## 2024-10-18 RX ADMIN — APIXABAN 5 MG: 5 TABLET, FILM COATED ORAL at 21:06

## 2024-10-18 RX ADMIN — PRIMIDONE 100 MG: 50 TABLET ORAL at 21:06

## 2024-10-18 RX ADMIN — CARVEDILOL 6.25 MG: 6.25 TABLET, FILM COATED ORAL at 17:31

## 2024-10-18 RX ADMIN — APIXABAN 5 MG: 5 TABLET, FILM COATED ORAL at 09:01

## 2024-10-18 ASSESSMENT — PAIN DESCRIPTION - LOCATION
LOCATION: LEG
LOCATION: LEG

## 2024-10-18 ASSESSMENT — PAIN SCALES - GENERAL
PAINLEVEL_OUTOF10: 2
PAINLEVEL_OUTOF10: 2

## 2024-10-18 ASSESSMENT — PAIN SCALES - WONG BAKER: WONGBAKER_NUMERICALRESPONSE: HURTS A LITTLE BIT

## 2024-10-18 NOTE — PLAN OF CARE
Problem: Discharge Planning  Goal: Discharge to home or other facility with appropriate resources  10/18/2024 0210 by Shree Hilliard RN  Outcome: Progressing  10/17/2024 1217 by Neri Solo LPN  Outcome: Progressing     Problem: Safety - Adult  Goal: Free from fall injury  10/18/2024 0210 by Shree Hilliard RN  Outcome: Progressing  10/17/2024 1217 by Neri Solo LPN  Outcome: Progressing     Problem: Pain  Goal: Verbalizes/displays adequate comfort level or baseline comfort level  10/18/2024 0210 by Shree Hilliard RN  Outcome: Progressing  10/17/2024 1217 by Neri Solo LPN  Outcome: Progressing

## 2024-10-18 NOTE — CONSULTS
GASTROENTEROLOGY CONSULTATION NOTE      10/18/2024      REASON FOR CONSULT: Elevated liver enzymes    REQUESTING PHYSICIAN:  aMnoj Moore MD    HISTORY OF PRESENT ILLNESS:    The patient is a 69 y.o. male with history of bipolar disorder who presents with elevated liver enzymes.  Patient states that he was unaware that his liver enzymes were elevated.  He denies any prior history of any liver disease.  Denies any family history of liver disease.  Denies any history of IV drug use or alcohol or recreational drug use.  Denies any rash, jaundice, nausea, vomiting, diarrhea, confusion, disorientation, abdominal pain, fever, chills.  I reviewed his psych meds and he states that he has been taking all of them for 10 years.  Most recently he was started on Seroquel but he only got 1 dose just yesterday.  Of note he was on Depakote also for about 10 years which was held since elevation of liver enzymes.  Patient is tolerating diet.  Denies any other complaints.    Medical History:   Past Medical History:   Diagnosis Date    Arthritis     Bipolar 1 disorder (HCC)     COPD (chronic obstructive pulmonary disease) (HCC)     \"use to have copd but is better\"    Hx of blood clots     \"had blood clot in lung and in my legs- that was in 2016- on Eliquis\"    Hyperlipidemia     Hypertension     Neuropathy     Schizo affective schizophrenia (HCC)     \"follow with Dr GERHARD Ramirez at [facility]    Tremors of nervous system     WD-Arterial insufficiency of lower extremity (HCC) 2/4/2021    WD-Cellulitis of left lower leg 2/4/2021    WD-Venous insufficiency of both lower extremities 2/4/2021    WDCellulitis of leg, right 2/4/2021       SURGICAL HISTORY:  Past Surgical History:   Procedure Laterality Date    COLONOSCOPY  last one 2012    DENTAL SURGERY      \"wisdom teeth put to sleep- yrs ago\"    FRACTURE SURGERY  age 25    fx left fibia and tibia- hardware later removed    HYDROCELE EXCISION  10+ yrs ago    
Goals: 1 week  Short Term Goal 1: pt to complete all bed mobility mod I  Short Term Goal 2: pt to complete all STS transfers to/from bed, commode, and chair mod I  Short Term Goal 3: pt to ambulate 150' with LRAD mod I       Treatment plan:  Bed mobility, transfers, balance, gait, TA, TX    Recommendations for NURSING mobility: amb with gait belt and RW    Time:   Time in: 1223  Time out: 1246  Timed treatment minutes: 13  Total time: 23    Electronically signed by:    Lauren Farley, PT  10/16/2024, 4:20 PM   
(H) 10 - 40 U/L     (H) 15 - 37 U/L   CBC with Diff    Collection Time: 10/17/24  3:42 AM   Result Value Ref Range    WBC 7.1 4.0 - 10.5 k/uL    RBC 3.40 (L) 4.60 - 6.20 m/uL    Hemoglobin 10.4 (L) 13.5 - 18.0 g/dL    Hematocrit 32.4 (L) 42.0 - 52.0 %    MCV 95.3 78.0 - 100.0 fL    MCH 30.6 27.0 - 31.0 pg    MCHC 32.1 32.0 - 36.0 g/dL    RDW 13.7 11.7 - 14.9 %    Platelets 224 140 - 440 k/uL    MPV 9.0 7.5 - 11.1 fL    Neutrophils % 69 (H) 36 - 66 %    Lymphocytes % 17 (L) 24 - 44 %    Monocytes % 7 (H) 0 - 4 %    Eosinophils % 6 (H) 0.0 - 3.0 %    Basophils % 1 0 - 1 %    Immature Granulocytes % 0 0 %    Neutrophils Absolute 4.91 k/uL    Lymphocytes Absolute 1.19 k/uL    Monocytes Absolute 0.48 k/uL    Eosinophils Absolute 0.43 k/uL    Basophils Absolute 0.04 k/uL    Immature Granulocytes Absolute 0.02 k/uL       Review of Systems:  Reports of no current cardiovascular, respiratory, gastrointestinal, genitourinary, integumentary, neurological, muscuoskeletal, or immunological symptoms today.   PSYCHIATRIC: See HPI above.    PSYCHIATRIC EXAMINATION / MENTAL STATUS EXAM    CONSTITUTIONAL:    Vitals:   Vitals:    10/17/24 1449   BP: (!) 110/55   Pulse: 56   Resp: 18   Temp: 98.6 °F (37 °C)   SpO2: 95%      General appearance: [x] appears age, []  appears older than stated age,               [x]  adequately dressed and groomed, [] disheveled,               [x]  in no acute distress, [] appears mildly distressed, [] other           MUSCULOSKELETAL:   Gait:   [] normal, [] antalgic, [] unsteady, [] gait not evaluated   Station:             [] erect, [] sitting, [] recumbent, [] other        Strength/tone:  [x] muscle strength and tone appear consistent with age and                                        condition     [] atrophy      [] abnormal movements     Vitals: Blood pressure (!) 110/55, pulse 56, temperature 98.6 °F (37 °C), temperature source Oral, resp. rate 18, height 1.554 m (5' 1.18\"), weight 112.5 kg 
Pain and swelling of right ankle, Closed dislocation of fifth toe of right foot, initial encounter, Lymphedema, and Injury from broken glass, initial encounter were also pertinent to this visit.. Pt at baseline is independent with ADLs and independent with functional transfers/mobility. Pt currently presents w/ deficits relating to ADLs, IADLs, UE strength/ROM, functional activity tolerance, cognition, safety awareness, and functional mobility. Pt would benefit from continued acute care OT services     Complexity: Moderate   Prognosis: Good, no significant barriers to participation at this time.   Occupational Therapy Plan  Times Per Week: 2-4x wk  Times Per Day: Once a day  Current Treatment Recommendations: Strengthening, Balance training, Functional mobility training, Endurance training, Pain management, Cognitive reorientation, Safety education & training, Patient/Caregiver education & training, Equipment evaluation, education, & procurement, Gait training, Self-Care / ADL, Home management training, Positioning       Goals:  - Pt will perform LE ADLs with Mod I  using AE PRN by d/c  - Pt will perform toileting with Mod I using AE PRN by d/c  - Pt will perform HH distance functional mobility with Mod I  using DME PRN in preparation for return to home   - Pt will perform functional transfers to/from bed, chair, and toilet with Mod I using AE PRN by d/c  - Pt will perform therex/theract in order to increase functional activity tolerance    Treatment plan:  Pt will perform therex/theract in order to increase functional activity tolerance in preparation for ADL participation.    Recommendations for NURSING activity: Up to chair for all 3 meals and up to toilet for all toileting needs    Time:   Time in: 1223  Time out: 1246  Timed treatment minutes: 13  Total time: 23    Electronically signed by:    PREET Ruggiero/L [id number]  10/16/2024, 6:35 PM

## 2024-10-19 PROBLEM — F31.13 BIPOLAR DISORDER WITH SEVERE MANIA (HCC): Status: ACTIVE | Noted: 2024-10-19

## 2024-10-19 LAB
ALBUMIN SERPL-MCNC: 3 G/DL (ref 3.4–5)
ALBUMIN/GLOB SERPL: 0.8 {RATIO} (ref 1.1–2.2)
ALP SERPL-CCNC: 87 U/L (ref 40–129)
ALT SERPL-CCNC: 126 U/L (ref 10–40)
ANION GAP SERPL CALCULATED.3IONS-SCNC: 8 MMOL/L (ref 9–17)
AST SERPL-CCNC: 68 U/L (ref 15–37)
BILIRUB SERPL-MCNC: 0.3 MG/DL (ref 0–1)
BUN SERPL-MCNC: 15 MG/DL (ref 7–20)
CALCIUM SERPL-MCNC: 9.3 MG/DL (ref 8.3–10.6)
CHLORIDE SERPL-SCNC: 99 MMOL/L (ref 99–110)
CO2 SERPL-SCNC: 29 MMOL/L (ref 21–32)
CREAT SERPL-MCNC: 0.9 MG/DL (ref 0.8–1.3)
GFR, ESTIMATED: 81 ML/MIN/1.73M2
GLUCOSE SERPL-MCNC: 74 MG/DL (ref 74–99)
POTASSIUM SERPL-SCNC: 3.8 MMOL/L (ref 3.5–5.1)
PROT SERPL-MCNC: 6.9 G/DL (ref 6.4–8.2)
SODIUM SERPL-SCNC: 136 MMOL/L (ref 136–145)

## 2024-10-19 PROCEDURE — 6370000000 HC RX 637 (ALT 250 FOR IP): Performed by: STUDENT IN AN ORGANIZED HEALTH CARE EDUCATION/TRAINING PROGRAM

## 2024-10-19 PROCEDURE — 6370000000 HC RX 637 (ALT 250 FOR IP): Performed by: PHYSICIAN ASSISTANT

## 2024-10-19 PROCEDURE — 36415 COLL VENOUS BLD VENIPUNCTURE: CPT

## 2024-10-19 PROCEDURE — 90792 PSYCH DIAG EVAL W/MED SRVCS: CPT | Performed by: PSYCHIATRY & NEUROLOGY

## 2024-10-19 PROCEDURE — 97116 GAIT TRAINING THERAPY: CPT

## 2024-10-19 PROCEDURE — 99232 SBSQ HOSP IP/OBS MODERATE 35: CPT | Performed by: INTERNAL MEDICINE

## 2024-10-19 PROCEDURE — 6370000000 HC RX 637 (ALT 250 FOR IP)

## 2024-10-19 PROCEDURE — 80053 COMPREHEN METABOLIC PANEL: CPT

## 2024-10-19 PROCEDURE — 97530 THERAPEUTIC ACTIVITIES: CPT

## 2024-10-19 PROCEDURE — 6370000000 HC RX 637 (ALT 250 FOR IP): Performed by: NURSE PRACTITIONER

## 2024-10-19 PROCEDURE — 1200000000 HC SEMI PRIVATE

## 2024-10-19 PROCEDURE — 94761 N-INVAS EAR/PLS OXIMETRY MLT: CPT

## 2024-10-19 RX ADMIN — FUROSEMIDE 40 MG: 40 TABLET ORAL at 08:33

## 2024-10-19 RX ADMIN — CARVEDILOL 6.25 MG: 6.25 TABLET, FILM COATED ORAL at 18:18

## 2024-10-19 RX ADMIN — LITHIUM CARBONATE 600 MG: 300 CAPSULE, GELATIN COATED ORAL at 21:54

## 2024-10-19 RX ADMIN — LITHIUM CARBONATE 600 MG: 300 CAPSULE, GELATIN COATED ORAL at 08:32

## 2024-10-19 RX ADMIN — TRAZODONE HYDROCHLORIDE 300 MG: 50 TABLET ORAL at 21:53

## 2024-10-19 RX ADMIN — PRIMIDONE 100 MG: 50 TABLET ORAL at 21:54

## 2024-10-19 RX ADMIN — ASPIRIN 81 MG: 81 TABLET, CHEWABLE ORAL at 08:32

## 2024-10-19 RX ADMIN — PRIMIDONE 100 MG: 50 TABLET ORAL at 08:32

## 2024-10-19 RX ADMIN — APIXABAN 5 MG: 5 TABLET, FILM COATED ORAL at 08:32

## 2024-10-19 RX ADMIN — APIXABAN 5 MG: 5 TABLET, FILM COATED ORAL at 21:53

## 2024-10-19 RX ADMIN — POTASSIUM CHLORIDE 10 MEQ: 750 TABLET, EXTENDED RELEASE ORAL at 08:33

## 2024-10-19 RX ADMIN — QUETIAPINE FUMARATE 100 MG: 100 TABLET ORAL at 21:53

## 2024-10-19 RX ADMIN — FUROSEMIDE 40 MG: 40 TABLET ORAL at 18:18

## 2024-10-19 ASSESSMENT — PAIN DESCRIPTION - ORIENTATION: ORIENTATION: RIGHT;LEFT

## 2024-10-19 ASSESSMENT — PAIN SCALES - WONG BAKER: WONGBAKER_NUMERICALRESPONSE: HURTS WORST

## 2024-10-19 ASSESSMENT — PAIN SCALES - GENERAL
PAINLEVEL_OUTOF10: 0
PAINLEVEL_OUTOF10: 2

## 2024-10-19 ASSESSMENT — PAIN DESCRIPTION - DESCRIPTORS: DESCRIPTORS: DISCOMFORT

## 2024-10-19 ASSESSMENT — PAIN DESCRIPTION - LOCATION: LOCATION: ANKLE

## 2024-10-19 NOTE — VIRTUAL HEALTH
accessible to house hold items. PT denies any past suicide attempts or behavior     1) Within the past month, have you wished you were dead or wished you could go to sleep and not wake up?  No   2) Have you actually had any thoughts of killing yourself?  No   3) Have you been thinking about how you might kill yourself?  No   4) Have you had these thoughts and had some intention of acting on them?  No   5) Have you started to work out or worked out the details of how to kill yourself? Do you intend to carry out this plan?  No   6) Have you ever done anything, started to do anything, or prepared to do anything to end your life? No   Did this occur within the past 3 months?  No   Some encounter information is confidential and restricted. Go to Review Flowsheets activity to see all data.             Impression:   Bipolar D/O type 1 manic    Problem List:   <principal problem not specified>    Pt requires inpt psychiatric admission once medically cleared, pt reqires sitter until transfer.    Plan:  1. Reviewed treatment plan with patient including medication risks, benefits, side effects. Obtained informed consent for treatment.   2. Psychiatric management:medication initiation and titration, recommend inpt mental health admission, safe and theraputic environment.  3. Status of problem/condition: ?pending  4. Medical co-morbidities: [facility] appreciate assistance  5. Legal Status: involuntary (psychosis and shy)  6. The treatment team reviewed with the patient the diagnosis and treatment recommendations to include the risks, benefits, and side effects of chosen medications.  7. The patient verbalized understanding and agreed with the treatment regimen as outlined above.  8. Medical records, Labs, Diagnotic tests reviewed  9. Interval History.  10. Review current labs  11. Continue current medications  12. Supportive Therapy Provided  13. Pt had an opportunity to ask questions and address
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
## 2024-10-20 VITALS
HEART RATE: 63 BPM | BODY MASS INDEX: 46.83 KG/M2 | OXYGEN SATURATION: 97 % | DIASTOLIC BLOOD PRESSURE: 64 MMHG | SYSTOLIC BLOOD PRESSURE: 121 MMHG | RESPIRATION RATE: 18 BRPM | WEIGHT: 248.02 LBS | TEMPERATURE: 98.8 F | HEIGHT: 61 IN

## 2024-10-20 LAB
AMPHET UR QL SCN: NEGATIVE
BARBITURATES UR QL SCN: POSITIVE
BENZODIAZ UR QL: NEGATIVE
CANNABINOIDS UR QL SCN: NEGATIVE
COCAINE UR QL SCN: NEGATIVE
FENTANYL UR QL: NEGATIVE
IGG SERPL-MCNC: 1680 MG/DL (ref 700–1600)
IRON SATN MFR SERPL: 14 % (ref 15–50)
IRON SERPL-MCNC: 29 UG/DL (ref 59–158)
OPIATES UR QL SCN: NEGATIVE
OXYCODONE UR QL SCN: NEGATIVE
SARS-COV-2 RNA RESP QL NAA+PROBE: NOT DETECTED
SPECIMEN DESCRIPTION: NORMAL
TEST INFORMATION: ABNORMAL
TIBC SERPL-MCNC: 206 UG/DL (ref 260–445)
TISSUE TRANSGLUTAMINASE AB, IGA: <0.5 U/ML (ref 0–14)
UNSATURATED IRON BINDING CAPACITY: 177 UG/DL (ref 110–370)

## 2024-10-20 PROCEDURE — 80307 DRUG TEST PRSMV CHEM ANLYZR: CPT

## 2024-10-20 PROCEDURE — 87635 SARS-COV-2 COVID-19 AMP PRB: CPT

## 2024-10-20 NOTE — DISCHARGE SUMMARY
V2.0  Discharge Summary    Name:  Jhonatan Cameron /Age/Sex: 1955 (69 y.o. male)   Admit Date: 10/15/2024  Discharge Date: 10/20/24    MRN & CSN:  [id number] & [id number] Encounter Date and Time 10/20/24 2:14 PM EDT    Attending:  No att. providers found Discharging Provider: Katherine Barrios PA-C       Hospital Course:     Brief HPI: Jhonatan Cameron is a 69 y.o. male with past medical history of arthritis, bipolar 1 disorder, schizophrenia, COPD, hyperlipidemia, hypertension, neuropathy, tremors, lymphedema, and venous insufficiency who presents with Bipolar I disorder with shy (HCC). Patient is medically stable for discharge.     Problems addressed during this hospitalization:     Transaminitis  - , AST 68 - downtrending . Previously normal as of September.  - Denied abdominal pain or heavy alcohol use. Denied heavy acetaminophen use.  - Currently asymptomatic without abdominal pain, scleral icterus, ascites, N/V  - RUQ US with limited exam but no acute sonographic abnormalities  - Likely 2/2 to increased Depakote dose in September. Psych consulted who recommended holding Depakote. Appreciate recs.  - Lab workup negative including CK, Hep panel, ethanol, salicylate, APAP level, UDS  - Given persistent elevated LFTs, GI consulted. Initiated further workup to r/o autoimmune, metabolic, hereditary causes of liver disease. Recommended outpatient follow-up.      Bipolar 1 Disorder with acute shy  Schizophrenia  - Reported recent inpatient psych stay; patient states that he ran out of his meds prior to admission  - Per psych consult: continue lithium (0.4) and trazodone, increase Seroquel to 100 mg qhs, discontinue depakote 2/2 to above  - Psych recommend inpatient psych hospitalization, involuntary status once medically clear (recommend [facility])  - discharged to inpatient psych     Right foot pain likely from sprain   Left knee pain s/p knee replacement  - Patient states that his pain is improved and
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
## 2024-10-21 LAB
MITOCHONDRIA M2 IGG SER-ACNC: 5.1 UNITS (ref 0–24.9)
SMOOTH MUSCLE ANTIBODY: 8 UNITS (ref 0–19)

## 2024-10-22 LAB
ANCA AB PATTERN SER IF-IMP: NORMAL
ANCA IGG TITR SER IF: NORMAL {TITER}
CERULOPLASMIN SERPL-MCNC: 38 MG/DL (ref 15–30)
MYELOPEROXIDASE AB SER-ACNC: NORMAL

## 2024-11-08 ENCOUNTER — HOSPITAL ENCOUNTER (OUTPATIENT)
Age: 69
Discharge: HOME | End: 2024-11-08
Payer: MEDICARE

## 2024-11-08 LAB
ALBUMIN SERPL-MCNC: 3.8 G/DL (ref 3.4–5)
ALBUMIN/GLOB SERPL: 1.2 {RATIO} (ref 1.1–2.2)
ALP SERPL-CCNC: 93 U/L (ref 40–129)
ALT SERPL-CCNC: 16 U/L (ref 10–40)
AST SERPL-CCNC: 16 U/L (ref 15–37)
BILIRUB DIRECT SERPL-MCNC: <0.2 MG/DL (ref 0–0.3)
BILIRUB INDIRECT SERPL-MCNC: NORMAL MG/DL (ref 0–0.7)
BILIRUB SERPL-MCNC: 0.3 MG/DL (ref 0–1)
LITHIUM DATE LAST DOSE: ABNORMAL
LITHIUM DOSE AMOUNT: ABNORMAL
LITHIUM DOSE TIME: ABNORMAL
LITHIUM LEVEL: 0.3 MMOL/L (ref 0.6–1.2)
PROT SERPL-MCNC: 6.9 G/DL (ref 6.4–8.2)

## 2024-11-08 PROCEDURE — 36415 COLL VENOUS BLD VENIPUNCTURE: CPT

## 2024-11-08 PROCEDURE — 80076 HEPATIC FUNCTION PANEL: CPT

## 2024-11-08 PROCEDURE — 80178 ASSAY OF LITHIUM: CPT

## 2024-11-30 ENCOUNTER — HOSPITAL ENCOUNTER (OUTPATIENT)
Age: 69
Setting detail: OBSERVATION
Discharge: HOME | End: 2024-12-02
Attending: EMERGENCY MEDICINE | Admitting: STUDENT IN AN ORGANIZED HEALTH CARE EDUCATION/TRAINING PROGRAM
Payer: MEDICARE

## 2024-11-30 ENCOUNTER — APPOINTMENT (OUTPATIENT)
Dept: CT IMAGING | Age: 69
End: 2024-11-30
Payer: MEDICARE

## 2024-11-30 DIAGNOSIS — Z79.899 ON POTASSIUM WASTING DIURETIC THERAPY: ICD-10-CM

## 2024-11-30 DIAGNOSIS — R29.90 STROKE-LIKE SYMPTOMS: Primary | ICD-10-CM

## 2024-11-30 PROBLEM — R47.1 DYSARTHRIA: Status: ACTIVE | Noted: 2024-11-30

## 2024-11-30 LAB
ALBUMIN SERPL-MCNC: 3.5 G/DL (ref 3.4–5)
ALBUMIN/GLOB SERPL: 1.2 {RATIO} (ref 1.1–2.2)
ALP SERPL-CCNC: 99 U/L (ref 40–129)
ALT SERPL-CCNC: 11 U/L (ref 10–40)
AMPHET UR QL SCN: NEGATIVE
ANION GAP SERPL CALCULATED.3IONS-SCNC: 9 MMOL/L (ref 9–17)
AST SERPL-CCNC: 27 U/L (ref 15–37)
BACTERIA URNS QL MICRO: ABNORMAL
BARBITURATES UR QL SCN: NEGATIVE
BASOPHILS # BLD: 0.05 K/UL
BASOPHILS NFR BLD: 1 % (ref 0–1)
BENZODIAZ UR QL: NEGATIVE
BILIRUB SERPL-MCNC: 0.3 MG/DL (ref 0–1)
BILIRUB UR QL STRIP: NEGATIVE
BUN SERPL-MCNC: 14 MG/DL (ref 7–20)
CALCIUM SERPL-MCNC: 9.1 MG/DL (ref 8.3–10.6)
CANNABINOIDS UR QL SCN: NEGATIVE
CHLORIDE SERPL-SCNC: 101 MMOL/L (ref 99–110)
CLARITY UR: CLEAR
CO2 SERPL-SCNC: 25 MMOL/L (ref 21–32)
COCAINE UR QL SCN: NEGATIVE
CREAT SERPL-MCNC: 1.1 MG/DL (ref 0.8–1.3)
EOSINOPHIL # BLD: 0.5 K/UL
EOSINOPHILS RELATIVE PERCENT: 6 % (ref 0–3)
ERYTHROCYTE [DISTWIDTH] IN BLOOD BY AUTOMATED COUNT: 14.6 % (ref 11.7–14.9)
ETHANOLAMINE SERPL-MCNC: <10 MG/DL (ref 0–0.08)
GFR, ESTIMATED: 70 ML/MIN/1.73M2
GLUCOSE BLD-MCNC: 88 MG/DL
GLUCOSE BLD-MCNC: 88 MG/DL (ref 74–99)
GLUCOSE SERPL-MCNC: 84 MG/DL (ref 74–99)
GLUCOSE UR STRIP-MCNC: NEGATIVE MG/DL
HGB BLD-MCNC: 12 G/DL (ref 13.5–18)
HGB UR QL STRIP.AUTO: ABNORMAL
IMM GRANULOCYTES # BLD AUTO: 0.02 K/UL
IMM GRANULOCYTES NFR BLD: 0 %
INR PPP: 1
KETONES UR STRIP-MCNC: NEGATIVE MG/DL
LEUKOCYTE ESTERASE UR QL STRIP: NEGATIVE
LYMPHOCYTES NFR BLD: 1.44 K/UL
MCH RBC QN AUTO: 29.6 PG (ref 27–31)
MCHC RBC AUTO-ENTMCNC: 32 G/DL (ref 32–36)
MCV RBC AUTO: 92.6 FL (ref 78–100)
MONOCYTES NFR BLD: 0.56 K/UL
MONOCYTES NFR BLD: 7 % (ref 0–4)
NEUTS SEG NFR BLD: 5.25 K/UL
OPIATES UR QL SCN: NEGATIVE
OXYCODONE UR QL SCN: NEGATIVE
PMV BLD AUTO: 9.8 FL (ref 7.5–11.1)
POTASSIUM SERPL-SCNC: 3.9 MMOL/L (ref 3.5–5.1)
PROT SERPL-MCNC: 6.5 G/DL (ref 6.4–8.2)
PROT UR STRIP-MCNC: NEGATIVE MG/DL
RBC # BLD AUTO: 4.05 M/UL (ref 4.6–6.2)
RBC #/AREA URNS HPF: <1 /HPF (ref 0–2)
SODIUM SERPL-SCNC: 135 MMOL/L (ref 136–145)
SP GR UR STRIP: <1.005 (ref 1–1.03)
TEST INFORMATION: NORMAL
TRICHOMONAS #/AREA URNS HPF: ABNORMAL /[HPF]
TROPONIN I SERPL HS-MCNC: 16 NG/L (ref 0–22)
WBC #/AREA URNS HPF: <1 /HPF (ref 0–5)

## 2024-11-30 PROCEDURE — G0378 HOSPITAL OBSERVATION PER HR: HCPCS

## 2024-11-30 PROCEDURE — 99285 EMERGENCY DEPT VISIT HI MDM: CPT

## 2024-11-30 PROCEDURE — 85610 PROTHROMBIN TIME: CPT

## 2024-11-30 PROCEDURE — 85025 COMPLETE CBC W/AUTO DIFF WBC: CPT

## 2024-11-30 PROCEDURE — 81001 URINALYSIS AUTO W/SCOPE: CPT

## 2024-11-30 PROCEDURE — 36415 COLL VENOUS BLD VENIPUNCTURE: CPT

## 2024-11-30 PROCEDURE — 93005 ELECTROCARDIOGRAM TRACING: CPT | Performed by: EMERGENCY MEDICINE

## 2024-11-30 PROCEDURE — 70498 CT ANGIOGRAPHY NECK: CPT

## 2024-11-30 PROCEDURE — 6360000004 HC RX CONTRAST MEDICATION: Performed by: EMERGENCY MEDICINE

## 2024-11-30 PROCEDURE — 84484 ASSAY OF TROPONIN QUANT: CPT

## 2024-11-30 PROCEDURE — 80053 COMPREHEN METABOLIC PANEL: CPT

## 2024-11-30 PROCEDURE — 82947 ASSAY GLUCOSE BLOOD QUANT: CPT

## 2024-11-30 PROCEDURE — G0480 DRUG TEST DEF 1-7 CLASSES: HCPCS

## 2024-11-30 PROCEDURE — 80307 DRUG TEST PRSMV CHEM ANLYZR: CPT

## 2024-11-30 PROCEDURE — 70450 CT HEAD/BRAIN W/O DYE: CPT

## 2024-11-30 RX ORDER — ASPIRIN 81 MG/1
81 TABLET, CHEWABLE ORAL DAILY
Status: DISCONTINUED | OUTPATIENT
Start: 2024-12-01 | End: 2024-12-02 | Stop reason: HOSPADM

## 2024-11-30 RX ORDER — PRIMIDONE 50 MG/1
100 TABLET ORAL 2 TIMES DAILY
Status: DISCONTINUED | OUTPATIENT
Start: 2024-12-01 | End: 2024-12-02 | Stop reason: HOSPADM

## 2024-11-30 RX ORDER — POLYETHYLENE GLYCOL 3350 17 G/17G
17 POWDER, FOR SOLUTION ORAL DAILY PRN
Status: DISCONTINUED | OUTPATIENT
Start: 2024-11-30 | End: 2024-12-02 | Stop reason: HOSPADM

## 2024-11-30 RX ORDER — LITHIUM CARBONATE 300 MG/1
600 CAPSULE ORAL 2 TIMES DAILY
Status: DISCONTINUED | OUTPATIENT
Start: 2024-12-01 | End: 2024-12-02 | Stop reason: HOSPADM

## 2024-11-30 RX ORDER — POTASSIUM CHLORIDE 7.45 MG/ML
10 INJECTION INTRAVENOUS PRN
Status: DISCONTINUED | OUTPATIENT
Start: 2024-11-30 | End: 2024-12-02 | Stop reason: HOSPADM

## 2024-11-30 RX ORDER — ONDANSETRON 2 MG/ML
4 INJECTION INTRAMUSCULAR; INTRAVENOUS EVERY 6 HOURS PRN
Status: DISCONTINUED | OUTPATIENT
Start: 2024-11-30 | End: 2024-12-02 | Stop reason: HOSPADM

## 2024-11-30 RX ORDER — DIVALPROEX SODIUM 500 MG/1
2000 TABLET, FILM COATED, EXTENDED RELEASE ORAL
Status: DISCONTINUED | OUTPATIENT
Start: 2024-12-01 | End: 2024-12-02 | Stop reason: HOSPADM

## 2024-11-30 RX ORDER — ACETAMINOPHEN 650 MG/1
650 SUPPOSITORY RECTAL EVERY 6 HOURS PRN
Status: DISCONTINUED | OUTPATIENT
Start: 2024-11-30 | End: 2024-12-02 | Stop reason: HOSPADM

## 2024-11-30 RX ORDER — POTASSIUM CHLORIDE 1500 MG/1
40 TABLET, EXTENDED RELEASE ORAL PRN
Status: DISCONTINUED | OUTPATIENT
Start: 2024-11-30 | End: 2024-12-02 | Stop reason: HOSPADM

## 2024-11-30 RX ORDER — MAGNESIUM SULFATE IN WATER 40 MG/ML
2000 INJECTION, SOLUTION INTRAVENOUS PRN
Status: DISCONTINUED | OUTPATIENT
Start: 2024-11-30 | End: 2024-12-02 | Stop reason: HOSPADM

## 2024-11-30 RX ORDER — IOPAMIDOL 755 MG/ML
75 INJECTION, SOLUTION INTRAVASCULAR
Status: COMPLETED | OUTPATIENT
Start: 2024-11-30 | End: 2024-11-30

## 2024-11-30 RX ORDER — FUROSEMIDE 40 MG/1
40 TABLET ORAL 2 TIMES DAILY
Status: DISCONTINUED | OUTPATIENT
Start: 2024-12-01 | End: 2024-12-02 | Stop reason: HOSPADM

## 2024-11-30 RX ORDER — CARVEDILOL 6.25 MG/1
3.12 TABLET ORAL 2 TIMES DAILY WITH MEALS
Status: DISCONTINUED | OUTPATIENT
Start: 2024-12-01 | End: 2024-12-02 | Stop reason: HOSPADM

## 2024-11-30 RX ORDER — POTASSIUM CHLORIDE 750 MG/1
10 TABLET, EXTENDED RELEASE ORAL DAILY
Status: DISCONTINUED | OUTPATIENT
Start: 2024-12-01 | End: 2024-12-02 | Stop reason: HOSPADM

## 2024-11-30 RX ORDER — VITAMIN B COMPLEX
2000 TABLET ORAL DAILY
Status: DISCONTINUED | OUTPATIENT
Start: 2024-12-01 | End: 2024-12-02 | Stop reason: HOSPADM

## 2024-11-30 RX ORDER — QUETIAPINE FUMARATE 25 MG/1
50 TABLET, FILM COATED ORAL DAILY
Status: DISCONTINUED | OUTPATIENT
Start: 2024-12-01 | End: 2024-12-02 | Stop reason: HOSPADM

## 2024-11-30 RX ORDER — DOCUSATE SODIUM 100 MG/1
100 CAPSULE, LIQUID FILLED ORAL 2 TIMES DAILY
Status: DISCONTINUED | OUTPATIENT
Start: 2024-12-01 | End: 2024-12-02 | Stop reason: HOSPADM

## 2024-11-30 RX ORDER — ACETAMINOPHEN 325 MG/1
650 TABLET ORAL EVERY 6 HOURS PRN
Status: DISCONTINUED | OUTPATIENT
Start: 2024-11-30 | End: 2024-12-02 | Stop reason: HOSPADM

## 2024-11-30 RX ORDER — ONDANSETRON 4 MG/1
4 TABLET, ORALLY DISINTEGRATING ORAL EVERY 8 HOURS PRN
Status: DISCONTINUED | OUTPATIENT
Start: 2024-11-30 | End: 2024-12-02 | Stop reason: HOSPADM

## 2024-11-30 RX ADMIN — IOPAMIDOL 75 ML: 755 INJECTION, SOLUTION INTRAVENOUS at 20:01

## 2024-11-30 ASSESSMENT — LIFESTYLE VARIABLES
HOW MANY STANDARD DRINKS CONTAINING ALCOHOL DO YOU HAVE ON A TYPICAL DAY: 1 OR 2
HOW OFTEN DO YOU HAVE A DRINK CONTAINING ALCOHOL: MONTHLY OR LESS

## 2024-11-30 ASSESSMENT — PAIN - FUNCTIONAL ASSESSMENT: PAIN_FUNCTIONAL_ASSESSMENT: 0-10

## 2024-12-01 ENCOUNTER — APPOINTMENT (OUTPATIENT)
Dept: MRI IMAGING | Age: 69
End: 2024-12-01
Payer: MEDICARE

## 2024-12-01 ENCOUNTER — APPOINTMENT (OUTPATIENT)
Dept: CT IMAGING | Age: 69
End: 2024-12-01
Payer: MEDICARE

## 2024-12-01 LAB
ALBUMIN SERPL-MCNC: 3.1 G/DL (ref 3.4–5)
ALBUMIN/GLOB SERPL: 1.1 {RATIO} (ref 1.1–2.2)
ALP SERPL-CCNC: 85 U/L (ref 40–129)
ALT SERPL-CCNC: 10 U/L (ref 10–40)
ANION GAP SERPL CALCULATED.3IONS-SCNC: 8 MMOL/L (ref 9–17)
AST SERPL-CCNC: 23 U/L (ref 15–37)
BASOPHILS # BLD: 0.05 K/UL
BASOPHILS NFR BLD: 1 % (ref 0–1)
BILIRUB SERPL-MCNC: 0.4 MG/DL (ref 0–1)
BUN SERPL-MCNC: 12 MG/DL (ref 7–20)
CALCIUM SERPL-MCNC: 8.9 MG/DL (ref 8.3–10.6)
CHLORIDE SERPL-SCNC: 106 MMOL/L (ref 99–110)
CO2 SERPL-SCNC: 26 MMOL/L (ref 21–32)
CREAT SERPL-MCNC: 0.9 MG/DL (ref 0.8–1.3)
EKG ATRIAL RATE: 75 BPM
EKG DIAGNOSIS: NORMAL
EKG P AXIS: 103 DEGREES
EKG P-R INTERVAL: 146 MS
EKG Q-T INTERVAL: 396 MS
EKG QRS DURATION: 106 MS
EKG QTC CALCULATION (BAZETT): 442 MS
EKG R AXIS: -35 DEGREES
EKG T AXIS: 45 DEGREES
EKG VENTRICULAR RATE: 75 BPM
EOSINOPHIL # BLD: 0.42 K/UL
EOSINOPHILS RELATIVE PERCENT: 6 % (ref 0–3)
ERYTHROCYTE [DISTWIDTH] IN BLOOD BY AUTOMATED COUNT: 14.6 % (ref 11.7–14.9)
GFR, ESTIMATED: 89 ML/MIN/1.73M2
GLUCOSE SERPL-MCNC: 85 MG/DL (ref 74–99)
HCT VFR BLD AUTO: 37.4 % (ref 42–52)
HGB BLD-MCNC: 11.9 G/DL (ref 13.5–18)
IMM GRANULOCYTES # BLD AUTO: 0.02 K/UL
IMM GRANULOCYTES NFR BLD: 0 %
INR PPP: 1
LITHIUM DATE LAST DOSE: ABNORMAL
LITHIUM DOSE AMOUNT: ABNORMAL
LITHIUM DOSE TIME: ABNORMAL
LITHIUM LEVEL: 0.3 MMOL/L (ref 0.6–1.2)
LYMPHOCYTES NFR BLD: 1.38 K/UL
LYMPHOCYTES RELATIVE PERCENT: 18 % (ref 24–44)
MCH RBC QN AUTO: 29.5 PG (ref 27–31)
MCHC RBC AUTO-ENTMCNC: 31.8 G/DL (ref 32–36)
MCV RBC AUTO: 92.8 FL (ref 78–100)
MONOCYTES NFR BLD: 0.52 K/UL
MONOCYTES NFR BLD: 7 % (ref 0–4)
NEUTROPHILS NFR BLD: 68 % (ref 36–66)
NEUTS SEG NFR BLD: 5.15 K/UL
PLATELET # BLD AUTO: 190 K/UL (ref 140–440)
PMV BLD AUTO: 9.5 FL (ref 7.5–11.1)
POTASSIUM SERPL-SCNC: 3.5 MMOL/L (ref 3.5–5.1)
PROT SERPL-MCNC: 5.8 G/DL (ref 6.4–8.2)
PROTHROMBIN TIME: 14 SEC (ref 11.7–14.5)
RBC # BLD AUTO: 4.03 M/UL (ref 4.6–6.2)
SODIUM SERPL-SCNC: 140 MMOL/L (ref 136–145)
WBC OTHER # BLD: 7.5 K/UL (ref 4–10.5)

## 2024-12-01 PROCEDURE — 6370000000 HC RX 637 (ALT 250 FOR IP): Performed by: STUDENT IN AN ORGANIZED HEALTH CARE EDUCATION/TRAINING PROGRAM

## 2024-12-01 PROCEDURE — 36415 COLL VENOUS BLD VENIPUNCTURE: CPT

## 2024-12-01 PROCEDURE — 93010 ELECTROCARDIOGRAM REPORT: CPT | Performed by: INTERNAL MEDICINE

## 2024-12-01 PROCEDURE — 94761 N-INVAS EAR/PLS OXIMETRY MLT: CPT

## 2024-12-01 PROCEDURE — 99223 1ST HOSP IP/OBS HIGH 75: CPT | Performed by: STUDENT IN AN ORGANIZED HEALTH CARE EDUCATION/TRAINING PROGRAM

## 2024-12-01 PROCEDURE — 80053 COMPREHEN METABOLIC PANEL: CPT

## 2024-12-01 PROCEDURE — G0378 HOSPITAL OBSERVATION PER HR: HCPCS

## 2024-12-01 PROCEDURE — 85610 PROTHROMBIN TIME: CPT

## 2024-12-01 PROCEDURE — 85025 COMPLETE CBC W/AUTO DIFF WBC: CPT

## 2024-12-01 PROCEDURE — 70450 CT HEAD/BRAIN W/O DYE: CPT

## 2024-12-01 PROCEDURE — 70551 MRI BRAIN STEM W/O DYE: CPT

## 2024-12-01 PROCEDURE — 80178 ASSAY OF LITHIUM: CPT

## 2024-12-01 RX ADMIN — QUETIAPINE FUMARATE 50 MG: 25 TABLET ORAL at 08:25

## 2024-12-01 RX ADMIN — LITHIUM CARBONATE 600 MG: 300 CAPSULE, GELATIN COATED ORAL at 02:32

## 2024-12-01 RX ADMIN — LITHIUM CARBONATE 600 MG: 300 CAPSULE, GELATIN COATED ORAL at 08:25

## 2024-12-01 RX ADMIN — POTASSIUM CHLORIDE 10 MEQ: 750 TABLET, EXTENDED RELEASE ORAL at 08:24

## 2024-12-01 RX ADMIN — ASPIRIN 81 MG: 81 TABLET, CHEWABLE ORAL at 08:24

## 2024-12-01 RX ADMIN — DOCUSATE SODIUM 100 MG: 100 CAPSULE, LIQUID FILLED ORAL at 21:46

## 2024-12-01 RX ADMIN — Medication 2000 UNITS: at 08:25

## 2024-12-01 RX ADMIN — PRIMIDONE 100 MG: 50 TABLET ORAL at 08:24

## 2024-12-01 RX ADMIN — DIVALPROEX SODIUM 2000 MG: 500 TABLET, FILM COATED, EXTENDED RELEASE ORAL at 21:46

## 2024-12-01 RX ADMIN — APIXABAN 5 MG: 5 TABLET, FILM COATED ORAL at 08:24

## 2024-12-01 RX ADMIN — RISPERIDONE 3 MG: 2 TABLET ORAL at 22:47

## 2024-12-01 RX ADMIN — FUROSEMIDE 40 MG: 40 TABLET ORAL at 17:55

## 2024-12-01 RX ADMIN — PRIMIDONE 100 MG: 50 TABLET ORAL at 17:55

## 2024-12-01 RX ADMIN — DOCUSATE SODIUM 100 MG: 100 CAPSULE, LIQUID FILLED ORAL at 08:24

## 2024-12-01 RX ADMIN — APIXABAN 5 MG: 5 TABLET, FILM COATED ORAL at 02:32

## 2024-12-01 RX ADMIN — CARVEDILOL 3.12 MG: 6.25 TABLET, FILM COATED ORAL at 08:24

## 2024-12-01 RX ADMIN — CARVEDILOL 3.12 MG: 6.25 TABLET, FILM COATED ORAL at 17:55

## 2024-12-01 RX ADMIN — FUROSEMIDE 40 MG: 40 TABLET ORAL at 08:25

## 2024-12-01 RX ADMIN — LITHIUM CARBONATE 600 MG: 300 CAPSULE, GELATIN COATED ORAL at 21:46
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
## 2024-12-02 VITALS
OXYGEN SATURATION: 98 % | WEIGHT: 248.1 LBS | HEIGHT: 71 IN | RESPIRATION RATE: 18 BRPM | SYSTOLIC BLOOD PRESSURE: 140 MMHG | TEMPERATURE: 98 F | HEART RATE: 69 BPM | DIASTOLIC BLOOD PRESSURE: 74 MMHG | BODY MASS INDEX: 34.73 KG/M2

## 2024-12-02 PROBLEM — R29.90 STROKE-LIKE SYMPTOMS: Status: ACTIVE | Noted: 2024-12-02

## 2024-12-02 LAB
ALBUMIN SERPL-MCNC: 3.4 G/DL (ref 3.4–5)
ALBUMIN/GLOB SERPL: 1.1 {RATIO} (ref 1.1–2.2)
ALP SERPL-CCNC: 82 U/L (ref 40–129)
ALT SERPL-CCNC: 10 U/L (ref 10–40)
ANION GAP SERPL CALCULATED.3IONS-SCNC: 8 MMOL/L (ref 9–17)
AST SERPL-CCNC: 20 U/L (ref 15–37)
BASOPHILS # BLD: 0.05 K/UL
BASOPHILS NFR BLD: 1 % (ref 0–1)
BILIRUB SERPL-MCNC: 0.2 MG/DL (ref 0–1)
BUN SERPL-MCNC: 13 MG/DL (ref 7–20)
CALCIUM SERPL-MCNC: 9.3 MG/DL (ref 8.3–10.6)
CHLORIDE SERPL-SCNC: 104 MMOL/L (ref 99–110)
CO2 SERPL-SCNC: 28 MMOL/L (ref 21–32)
CREAT SERPL-MCNC: 0.8 MG/DL (ref 0.8–1.3)
EOSINOPHIL # BLD: 0.44 K/UL
EOSINOPHILS RELATIVE PERCENT: 7 % (ref 0–3)
ERYTHROCYTE [DISTWIDTH] IN BLOOD BY AUTOMATED COUNT: 14.7 % (ref 11.7–14.9)
GFR, ESTIMATED: >90 ML/MIN/1.73M2
GLUCOSE SERPL-MCNC: 92 MG/DL (ref 74–99)
HCT VFR BLD AUTO: 41.6 % (ref 42–52)
HGB BLD-MCNC: 13 G/DL (ref 13.5–18)
IMM GRANULOCYTES # BLD AUTO: 0.03 K/UL
IMM GRANULOCYTES NFR BLD: 1 %
LYMPHOCYTES NFR BLD: 1.35 K/UL
LYMPHOCYTES RELATIVE PERCENT: 20 % (ref 24–44)
MCH RBC QN AUTO: 29 PG (ref 27–31)
MCHC RBC AUTO-ENTMCNC: 31.3 G/DL (ref 32–36)
MCV RBC AUTO: 92.9 FL (ref 78–100)
MONOCYTES NFR BLD: 0.42 K/UL
MONOCYTES NFR BLD: 6 % (ref 0–4)
NEUTROPHILS NFR BLD: 66 % (ref 36–66)
NEUTS SEG NFR BLD: 4.35 K/UL
PLATELET # BLD AUTO: 187 K/UL (ref 140–440)
PMV BLD AUTO: 9.2 FL (ref 7.5–11.1)
POTASSIUM SERPL-SCNC: 3.7 MMOL/L (ref 3.5–5.1)
PROT SERPL-MCNC: 6.3 G/DL (ref 6.4–8.2)
RBC # BLD AUTO: 4.48 M/UL (ref 4.6–6.2)
SODIUM SERPL-SCNC: 141 MMOL/L (ref 136–145)
WBC OTHER # BLD: 6.6 K/UL (ref 4–10.5)

## 2024-12-02 PROCEDURE — 85025 COMPLETE CBC W/AUTO DIFF WBC: CPT

## 2024-12-02 PROCEDURE — 99232 SBSQ HOSP IP/OBS MODERATE 35: CPT | Performed by: PHYSICIAN ASSISTANT

## 2024-12-02 PROCEDURE — 94761 N-INVAS EAR/PLS OXIMETRY MLT: CPT

## 2024-12-02 PROCEDURE — 6370000000 HC RX 637 (ALT 250 FOR IP): Performed by: STUDENT IN AN ORGANIZED HEALTH CARE EDUCATION/TRAINING PROGRAM

## 2024-12-02 PROCEDURE — G0378 HOSPITAL OBSERVATION PER HR: HCPCS

## 2024-12-02 PROCEDURE — 80053 COMPREHEN METABOLIC PANEL: CPT

## 2024-12-02 PROCEDURE — 36415 COLL VENOUS BLD VENIPUNCTURE: CPT

## 2024-12-02 RX ORDER — CARVEDILOL 3.12 MG/1
3.12 TABLET ORAL 2 TIMES DAILY WITH MEALS
Qty: 60 TABLET | Refills: 0 | Status: SHIPPED | OUTPATIENT
Start: 2024-12-02

## 2024-12-02 RX ORDER — LITHIUM CARBONATE 300 MG
600 TABLET ORAL 2 TIMES DAILY
Qty: 120 TABLET | Refills: 0 | Status: SHIPPED | OUTPATIENT
Start: 2024-12-02 | End: 2025-01-01

## 2024-12-02 RX ORDER — ASPIRIN 81 MG/1
81 TABLET, CHEWABLE ORAL DAILY
Qty: 30 TABLET | Refills: 0 | Status: SHIPPED | OUTPATIENT
Start: 2024-12-07

## 2024-12-02 RX ORDER — RISPERIDONE 3 MG/1
3 TABLET ORAL NIGHTLY
Qty: 30 TABLET | Refills: 0 | Status: SHIPPED | OUTPATIENT
Start: 2024-12-02 | End: 2025-01-01

## 2024-12-02 RX ORDER — POTASSIUM CHLORIDE 750 MG/1
10 TABLET, EXTENDED RELEASE ORAL DAILY
Qty: 30 TABLET | Refills: 0 | Status: SHIPPED | OUTPATIENT
Start: 2024-12-02

## 2024-12-02 RX ORDER — DOCUSATE SODIUM 100 MG/1
100 CAPSULE, LIQUID FILLED ORAL 2 TIMES DAILY
Qty: 60 CAPSULE | Refills: 0 | Status: SHIPPED | OUTPATIENT
Start: 2024-12-02 | End: 2025-01-01

## 2024-12-02 RX ORDER — FUROSEMIDE 40 MG/1
40 TABLET ORAL 2 TIMES DAILY
Qty: 60 TABLET | Refills: 0 | Status: SHIPPED | OUTPATIENT
Start: 2024-12-02

## 2024-12-02 RX ORDER — PRIMIDONE 50 MG/1
100 TABLET ORAL 2 TIMES DAILY
Qty: 120 TABLET | Refills: 0 | Status: SHIPPED | OUTPATIENT
Start: 2024-12-02 | End: 2025-01-01

## 2024-12-02 RX ORDER — DIVALPROEX SODIUM 500 MG/1
2000 TABLET, DELAYED RELEASE ORAL DAILY
Qty: 120 TABLET | Refills: 0 | Status: SHIPPED | OUTPATIENT
Start: 2024-12-02 | End: 2025-01-01

## 2024-12-02 RX ORDER — QUETIAPINE FUMARATE 25 MG/1
50 TABLET, FILM COATED ORAL DAILY
Qty: 60 TABLET | Refills: 0 | Status: SHIPPED | OUTPATIENT
Start: 2024-12-02

## 2024-12-02 RX ADMIN — FUROSEMIDE 40 MG: 40 TABLET ORAL at 15:35

## 2024-12-02 RX ADMIN — Medication 2000 UNITS: at 09:05

## 2024-12-02 RX ADMIN — ACETAMINOPHEN 650 MG: 325 TABLET ORAL at 06:42

## 2024-12-02 RX ADMIN — ACETAMINOPHEN 650 MG: 325 TABLET ORAL at 11:13

## 2024-12-02 RX ADMIN — PRIMIDONE 100 MG: 50 TABLET ORAL at 09:04

## 2024-12-02 RX ADMIN — FUROSEMIDE 40 MG: 40 TABLET ORAL at 09:05

## 2024-12-02 RX ADMIN — LITHIUM CARBONATE 600 MG: 300 CAPSULE, GELATIN COATED ORAL at 09:05

## 2024-12-02 RX ADMIN — QUETIAPINE FUMARATE 50 MG: 25 TABLET ORAL at 05:36

## 2024-12-02 RX ADMIN — DOCUSATE SODIUM 100 MG: 100 CAPSULE, LIQUID FILLED ORAL at 09:05

## 2024-12-02 RX ADMIN — CARVEDILOL 3.12 MG: 6.25 TABLET, FILM COATED ORAL at 15:34

## 2024-12-02 RX ADMIN — CARVEDILOL 3.12 MG: 6.25 TABLET, FILM COATED ORAL at 09:04

## 2024-12-02 RX ADMIN — POTASSIUM CHLORIDE 10 MEQ: 750 TABLET, EXTENDED RELEASE ORAL at 09:04

## 2024-12-02 RX ADMIN — PRIMIDONE 100 MG: 50 TABLET ORAL at 15:35

## 2024-12-02 ASSESSMENT — PAIN DESCRIPTION - ORIENTATION
ORIENTATION: MID
ORIENTATION: MID

## 2024-12-02 ASSESSMENT — PAIN DESCRIPTION - LOCATION
LOCATION: HEAD
LOCATION: HEAD

## 2024-12-02 ASSESSMENT — PAIN SCALES - GENERAL
PAINLEVEL_OUTOF10: 0
PAINLEVEL_OUTOF10: 3
PAINLEVEL_OUTOF10: 5
PAINLEVEL_OUTOF10: 1

## 2024-12-02 ASSESSMENT — PAIN SCALES - WONG BAKER: WONGBAKER_NUMERICALRESPONSE: NO HURT

## 2024-12-02 ASSESSMENT — PAIN DESCRIPTION - DESCRIPTORS
DESCRIPTORS: ACHING
DESCRIPTORS: DISCOMFORT

## 2024-12-02 NOTE — PLAN OF CARE
Problem: Discharge Planning  Goal: Discharge to home or other facility with appropriate resources  Outcome: Progressing     Problem: Pain  Goal: Verbalizes/displays adequate comfort level or baseline comfort level  Outcome: Progressing     Problem: Neurosensory - Adult  Goal: Achieves stable or improved neurological status  Outcome: Progressing  Goal: Absence of seizures  Outcome: Progressing  Goal: Remains free of injury related to seizures activity  Outcome: Progressing  Goal: Achieves maximal functionality and self care  Outcome: Progressing     Problem: Safety - Adult  Goal: Free from fall injury  Outcome: Progressing     Problem: Respiratory - Adult  Goal: Achieves optimal ventilation and oxygenation  Outcome: Progressing     Problem: Cardiovascular - Adult  Goal: Maintains optimal cardiac output and hemodynamic stability  Outcome: Progressing  Goal: Absence of cardiac dysrhythmias or at baseline  Outcome: Progressing     Problem: Skin/Tissue Integrity - Adult  Goal: Skin integrity remains intact  Outcome: Progressing  Goal: Incisions, wounds, or drain sites healing without S/S of infection  Outcome: Progressing  Goal: Oral mucous membranes remain intact  Outcome: Progressing     Problem: Musculoskeletal - Adult  Goal: Return mobility to safest level of function  Outcome: Progressing  Goal: Return ADL status to a safe level of function  Outcome: Progressing     Problem: Gastrointestinal - Adult  Goal: Minimal or absence of nausea and vomiting  Outcome: Progressing  Goal: Maintains or returns to baseline bowel function  Outcome: Progressing  Goal: Maintains adequate nutritional intake  Outcome: Progressing     Problem: Genitourinary - Adult  Goal: Absence of urinary retention  Outcome: Progressing     Problem: Infection - Adult  Goal: Absence of infection at discharge  Outcome: Progressing  Goal: Absence of infection during hospitalization  Outcome: Progressing     Problem: Metabolic/Fluid and Electrolytes -
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
## 2024-12-02 NOTE — DISCHARGE INSTRUCTIONS
Internal Medicine Discharge Instruction    Discharge to:  Home  Diet: ADULT DIET; Regular  Activity: As tolerated       Be compliant with medications  Please take medications as prescribed   Please get blood work done in a few days        Electronically signed by Dom Tamez MD on 12/2/2024 at 7:50 AM    Ok to restart ASA on 12/7/24, ok to restart Eliquis on12/9/24. Discussed case with Dr. Franco who agrees with plan.     Return to the ER if you begin to experience worsening headaches, nausea/vomiting, vision changes, or strokelike symptoms such as weakness and/or numbness/tingling in your arms or legs, difficulty with speech, facial droop.

## 2024-12-02 NOTE — PLAN OF CARE
Problem: Discharge Planning  Goal: Discharge to home or other facility with appropriate resources  12/2/2024 1632 by Randa Fernandez RN  Outcome: Completed  12/2/2024 1026 by Randa Fernandez RN  Outcome: Progressing     Problem: Pain  Goal: Verbalizes/displays adequate comfort level or baseline comfort level  12/2/2024 1632 by Randa Fernandez RN  Outcome: Completed  12/2/2024 1026 by Randa Fernandez RN  Outcome: Progressing     Problem: Neurosensory - Adult  Goal: Achieves stable or improved neurological status  12/2/2024 1632 by Randa Fernandez RN  Outcome: Completed  12/2/2024 1026 by Randa Fernandez RN  Outcome: Progressing  Goal: Absence of seizures  12/2/2024 1632 by Randa Fernandez RN  Outcome: Completed  12/2/2024 1026 by Randa Fernandez RN  Outcome: Progressing  Goal: Remains free of injury related to seizures activity  12/2/2024 1632 by Randa Fernandez RN  Outcome: Completed  12/2/2024 1026 by Randa Fernandez RN  Outcome: Progressing  Goal: Achieves maximal functionality and self care  12/2/2024 1632 by Randa Fernandez RN  Outcome: Completed  12/2/2024 1026 by Randa Fernandez RN  Outcome: Progressing     Problem: Safety - Adult  Goal: Free from fall injury  12/2/2024 1632 by Randa Fernandez RN  Outcome: Completed  12/2/2024 1026 by Rnada Fernandez RN  Outcome: Progressing     Problem: Respiratory - Adult  Goal: Achieves optimal ventilation and oxygenation  12/2/2024 1632 by Randa Fernandez RN  Outcome: Completed  12/2/2024 1026 by Randa Fernandez RN  Outcome: Progressing     Problem: Cardiovascular - Adult  Goal: Maintains optimal cardiac output and hemodynamic stability  12/2/2024 1632 by Randa Fernandez RN  Outcome: Completed  12/2/2024 1026 by Randa Fernandez RN  Outcome: Progressing  Goal: Absence of cardiac dysrhythmias or at baseline  12/2/2024 1632 by Randa Fernandez RN  Outcome: Completed  12/2/2024 1026 by Randa Fernandez RN  Outcome: Progressing     Problem: Skin/Tissue Integrity - Adult  Goal: Skin integrity remains intact  12/2/2024 1632 by Rebecca

## 2024-12-02 NOTE — PLAN OF CARE
Problem: Discharge Planning  Goal: Discharge to home or other facility with appropriate resources  Outcome: Progressing     Problem: Neurosensory - Adult  Goal: Achieves stable or improved neurological status  12/1/2024 1931 by Esmer Davila RN  Outcome: Progressing  12/1/2024 0839 by Joe Brown RN  Outcome: Progressing  Goal: Absence of seizures  Outcome: Progressing  Goal: Remains free of injury related to seizures activity  Outcome: Progressing  Goal: Achieves maximal functionality and self care  Outcome: Progressing     Problem: Safety - Adult  Goal: Free from fall injury  12/1/2024 1931 by Esmer Davila RN  Outcome: Progressing  12/1/2024 0839 by Joe Brown RN  Outcome: Progressing
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
## 2024-12-02 NOTE — DISCHARGE INSTR - COC
Continuity of Care Form    Patient Name: Jhonatan Cameron   :  1955  MRN:  [id number]    Admit date:  2024  Discharge date:  ***    Code Status Order: Full Code   Advance Directives:   Advance Care Flowsheet Documentation             Admitting Physician:  Leighann Holt MD  PCP: Kaleb Ziegler APRN - CNP    Discharging Nurse: ***  Discharging Hospital Unit/Room#: -A  Discharging Unit Phone Number: ***    Emergency Contact:   Extended Emergency Contact Information  Primary Emergency Contact: Emily Cameron  Home Phone: [phone]  Work Phone: [phone]  Mobile Phone: [phone]  Relation: Brother/Sister    Past Surgical History:  Past Surgical History:   Procedure Laterality Date    COLONOSCOPY  last one     DENTAL SURGERY      \"wisdom teeth put to sleep- yrs ago\"    FRACTURE SURGERY  age 25    fx left fibia and tibia- hardware later removed    HYDROCELE EXCISION  10+ yrs ago    IR IVC FILTER PLACEMENT W IMAGING N/A 2016    ALN filter lot#[id number], exp - Dr [facility]    JOINT REPLACEMENT      L knee    UPPER GASTROINTESTINAL ENDOSCOPY  2018       Immunization History:   Immunization History   Administered Date(s) Administered    COVID-19, PFIZER PURPLE top, DILUTE for use, (age 12 y+), 30mcg/0.3mL 2021, 2021, 2021    COVID-19, PFIZER, (age 12y+), IM, 30mcg/0.3mL 2024    TDaP, ADACEL (age 10y-64y), BOOSTRIX (age 10y+), IM, 0.5mL 10/15/2024       Active Problems:  Patient Active Problem List   Diagnosis Code    SOB (shortness of breath) on exertion R06.02    Acute pulmonary embolism (HCC) I26.99    Gait disturbance R26.9    Dizzy R42    Morbid obesity E66.01    Bipolar disorder (HCC) F31.9    History of DVT of lower extremity Z86.718    General weakness R53.1    Abnormal EKG R94.31    Exertional dyspnea R06.09    Sinus bradycardia R00.1    Pulmonary embolism without acute cor pulmonale (HCC) I26.99    Morbid obesity due to excess

## 2024-12-02 NOTE — DISCHARGE SUMMARY
Cerebral Artery: Mild atherosclerotic disease with patent proximal segment. Limited evaluation of the distal branches Dural Venous Sinuses: Grossly unremarkable. MISCELLANEOUS FINDINGS: Lung Apices: The visualized lung apices are clear. Neck Soft Tissues: Unremarkable Orbital Soft Tissues: Unremarkable visualized portions of the orbits. Paranasal sinuses: Mild paranasal sinus disease. Osseous structures: There are degenerative changes of the cervical spine. Borderline developmental cervical spinal canal stenosis and superimposed multilevel degenerative disc and joint disease resulting in mild spinal canal stenosis at multiple levels. Advanced facet arthropathy and uncovertebral hypertrophy resulting in moderate or moderate to severe neural foraminal stenosis at multiple levels. No suspicious lytic or blastic bony lesions.     1.  Atherosclerotic disease of the extracranial and intracranial arterial vessels as outlined above. 2.  The A1 segment of the left ASHLEY is not well visualized and could be hypoplastic or occluded. 3.  No large vessel occlusion or hemodynamically significant stenosis in the head or the neck. 4.  Cervical spondylosis.. Electronically signed by Tasha Burnett    CT HEAD WO CONTRAST    Result Date: 11/30/2024  Head CT INDICATION: Stroke TECHNIQUE: Multiple 2D axial images obtained through the brain without intravenous contrast.  Radiation dose reduction techniques were used for this study:  All CT scans performed at this facility use one or all of the following: Automated exposure control, adjustment of the mA and/or kVp according to patient's size, iterative reconstruction. COMPARISON: None FINDINGS: No areas of abnormal attenuation are seen in the brain. There is no CT evidence of acute hemorrhage or infarction. The ventricles are normal in size. There are no extra-axial fluid collections. No masses are seen. Scattered paranasal sinus disease.     No CT evidence of acute intracranial

## 2024-12-02 NOTE — DISCHARGE INSTR - DIET

## 2025-01-01 ENCOUNTER — APPOINTMENT (OUTPATIENT)
Dept: CT IMAGING | Age: 70
End: 2025-01-01
Payer: MEDICARE

## 2025-01-01 ENCOUNTER — HOSPITAL ENCOUNTER (EMERGENCY)
Age: 70
Discharge: PSYCHIATRIC OR SUBSTANCE USE FACILITY | End: 2025-01-02
Attending: STUDENT IN AN ORGANIZED HEALTH CARE EDUCATION/TRAINING PROGRAM
Payer: MEDICARE

## 2025-01-01 DIAGNOSIS — F23 ACUTE PSYCHOSIS (HCC): Primary | ICD-10-CM

## 2025-01-01 PROCEDURE — 90792 PSYCH DIAG EVAL W/MED SRVCS: CPT | Performed by: REGISTERED NURSE

## 2025-01-01 PROCEDURE — 99285 EMERGENCY DEPT VISIT HI MDM: CPT

## 2025-01-01 ASSESSMENT — PAIN - FUNCTIONAL ASSESSMENT: PAIN_FUNCTIONAL_ASSESSMENT: NONE - DENIES PAIN

## 2025-01-02 ENCOUNTER — APPOINTMENT (OUTPATIENT)
Dept: CT IMAGING | Age: 70
End: 2025-01-02
Payer: MEDICARE

## 2025-01-02 VITALS
WEIGHT: 248.1 LBS | DIASTOLIC BLOOD PRESSURE: 69 MMHG | OXYGEN SATURATION: 96 % | HEART RATE: 65 BPM | TEMPERATURE: 98.2 F | BODY MASS INDEX: 34.6 KG/M2 | RESPIRATION RATE: 16 BRPM | SYSTOLIC BLOOD PRESSURE: 113 MMHG

## 2025-01-02 LAB
ALBUMIN SERPL-MCNC: 4.2 G/DL (ref 3.4–5)
ALBUMIN/GLOB SERPL: 1.2 {RATIO} (ref 1.1–2.2)
ALP SERPL-CCNC: 118 U/L (ref 40–129)
ALT SERPL-CCNC: 14 U/L (ref 10–40)
AMPHET UR QL SCN: NEGATIVE
ANION GAP SERPL CALCULATED.3IONS-SCNC: 10 MMOL/L (ref 9–17)
APAP SERPL-MCNC: <5 UG/ML (ref 10–30)
AST SERPL-CCNC: 27 U/L (ref 15–37)
BARBITURATES UR QL SCN: NEGATIVE
BASOPHILS # BLD: 0.06 K/UL
BASOPHILS NFR BLD: 1 % (ref 0–1)
BENZODIAZ UR QL: NEGATIVE
BILIRUB SERPL-MCNC: 0.3 MG/DL (ref 0–1)
BUN SERPL-MCNC: 23 MG/DL (ref 7–20)
CALCIUM SERPL-MCNC: 10.3 MG/DL (ref 8.3–10.6)
CANNABINOIDS UR QL SCN: NEGATIVE
CHLORIDE SERPL-SCNC: 104 MMOL/L (ref 99–110)
CHP ED QC CHECK: YES
CO2 SERPL-SCNC: 29 MMOL/L (ref 21–32)
COCAINE UR QL SCN: NEGATIVE
CREAT SERPL-MCNC: 0.9 MG/DL (ref 0.8–1.3)
DATE LAST DOSE: ABNORMAL
EOSINOPHIL # BLD: 0.52 K/UL
EOSINOPHILS RELATIVE PERCENT: 6 % (ref 0–3)
ERYTHROCYTE [DISTWIDTH] IN BLOOD BY AUTOMATED COUNT: 14.4 % (ref 11.7–14.9)
ETHANOLAMINE SERPL-MCNC: <10 MG/DL (ref 0–0.08)
FENTANYL UR QL: NEGATIVE
GFR, ESTIMATED: 82 ML/MIN/1.73M2
GLUCOSE BLD-MCNC: 97 MG/DL
GLUCOSE BLD-MCNC: 97 MG/DL (ref 74–99)
GLUCOSE SERPL-MCNC: 79 MG/DL (ref 74–99)
HCT VFR BLD AUTO: 46 % (ref 42–52)
HGB BLD-MCNC: 14.6 G/DL (ref 13.5–18)
IMM GRANULOCYTES # BLD AUTO: 0.02 K/UL
IMM GRANULOCYTES NFR BLD: 0 %
LITHIUM DATE LAST DOSE: ABNORMAL
LITHIUM DOSE AMOUNT: ABNORMAL
LITHIUM DOSE TIME: ABNORMAL
LITHIUM LEVEL: <0.1 MMOL/L (ref 0.6–1.2)
LYMPHOCYTES NFR BLD: 1.42 K/UL
LYMPHOCYTES RELATIVE PERCENT: 17 % (ref 24–44)
MCH RBC QN AUTO: 28.6 PG (ref 27–31)
MCHC RBC AUTO-ENTMCNC: 31.7 G/DL (ref 32–36)
MCV RBC AUTO: 90 FL (ref 78–100)
MONOCYTES NFR BLD: 0.6 K/UL
MONOCYTES NFR BLD: 7 % (ref 0–4)
NEUTROPHILS NFR BLD: 68 % (ref 36–66)
NEUTS SEG NFR BLD: 5.55 K/UL
OPIATES UR QL SCN: NEGATIVE
OXYCODONE UR QL SCN: NEGATIVE
PLATELET # BLD AUTO: 203 K/UL (ref 140–440)
PMV BLD AUTO: 9.5 FL (ref 7.5–11.1)
POTASSIUM SERPL-SCNC: 4.1 MMOL/L (ref 3.5–5.1)
PROT SERPL-MCNC: 7.7 G/DL (ref 6.4–8.2)
RBC # BLD AUTO: 5.11 M/UL (ref 4.6–6.2)
SALICYLATES SERPL-MCNC: <0.5 MG/DL (ref 15–30)
SODIUM SERPL-SCNC: 144 MMOL/L (ref 136–145)
TEST INFORMATION: NORMAL
TME LAST DOSE: ABNORMAL H
VALPROATE SERPL-MCNC: <3 UG/ML (ref 50–100)
VANCOMYCIN DOSE: ABNORMAL MG
WBC OTHER # BLD: 8.2 K/UL (ref 4–10.5)

## 2025-01-02 PROCEDURE — G0480 DRUG TEST DEF 1-7 CLASSES: HCPCS

## 2025-01-02 PROCEDURE — 80164 ASSAY DIPROPYLACETIC ACD TOT: CPT

## 2025-01-02 PROCEDURE — 96372 THER/PROPH/DIAG INJ SC/IM: CPT

## 2025-01-02 PROCEDURE — 84443 ASSAY THYROID STIM HORMONE: CPT

## 2025-01-02 PROCEDURE — 85025 COMPLETE CBC W/AUTO DIFF WBC: CPT

## 2025-01-02 PROCEDURE — 80179 DRUG ASSAY SALICYLATE: CPT

## 2025-01-02 PROCEDURE — 6360000002 HC RX W HCPCS: Performed by: STUDENT IN AN ORGANIZED HEALTH CARE EDUCATION/TRAINING PROGRAM

## 2025-01-02 PROCEDURE — 80061 LIPID PANEL: CPT

## 2025-01-02 PROCEDURE — 80053 COMPREHEN METABOLIC PANEL: CPT

## 2025-01-02 PROCEDURE — 80307 DRUG TEST PRSMV CHEM ANLYZR: CPT

## 2025-01-02 PROCEDURE — 80178 ASSAY OF LITHIUM: CPT

## 2025-01-02 PROCEDURE — 80143 DRUG ASSAY ACETAMINOPHEN: CPT

## 2025-01-02 PROCEDURE — 70450 CT HEAD/BRAIN W/O DYE: CPT

## 2025-01-02 PROCEDURE — 82962 GLUCOSE BLOOD TEST: CPT

## 2025-01-02 RX ORDER — HALOPERIDOL 5 MG/ML
5 INJECTION INTRAMUSCULAR ONCE
Status: COMPLETED | OUTPATIENT
Start: 2025-01-02 | End: 2025-01-02

## 2025-01-02 RX ORDER — LORAZEPAM 2 MG/ML
2 INJECTION INTRAMUSCULAR ONCE
Status: COMPLETED | OUTPATIENT
Start: 2025-01-02 | End: 2025-01-02

## 2025-01-02 RX ADMIN — HALOPERIDOL LACTATE 5 MG: 5 INJECTION, SOLUTION INTRAMUSCULAR at 04:04

## 2025-01-02 RX ADMIN — LORAZEPAM 2 MG: 2 INJECTION INTRAMUSCULAR; INTRAVENOUS at 04:04

## 2025-01-02 ASSESSMENT — PAIN - FUNCTIONAL ASSESSMENT: PAIN_FUNCTIONAL_ASSESSMENT: NONE - DENIES PAIN
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
## 2025-01-02 NOTE — VIRTUAL HEALTH
Take 1 tablet by mouth 2 times daily, Disp: 60 tablet, Rfl: 0  Not in a hospital admission.  Prior to Admission medications    Medication Sig Start Date End Date Taking? Authorizing Provider   divalproex (DEPAKOTE) 500 MG DR tablet Take 4 tablets by mouth daily Take 4 tablets (2000 mg) po at bedtime 12/2/24 1/1/25  Dom Tamez MD   primidone (MYSOLINE) 50 MG tablet Take 2 tablets by mouth in the morning and 2 tablets in the evening. 12/2/24 1/1/25  Dom Tamez MD   lithium 300 MG tablet Take 2 tablets by mouth 2 times daily Take 2 capsules (600 mg) po bid 12/2/24 1/1/25  Dom Tamez MD   QUEtiapine (SEROQUEL) 25 MG tablet Take 2 tablets by mouth Daily Bed time 12/2/24   Dom Tamez MD   risperiDONE (RISPERDAL) 3 MG tablet Take 1 tablet by mouth nightly 12/2/24 1/1/25  Dom Tamez MD   carvedilol (COREG) 3.125 MG tablet Take 1 tablet by mouth 2 times daily (with meals) 12/2/24   Dom Tamez MD   furosemide (LASIX) 40 MG tablet Take 1 tablet by mouth in the morning and 1 tablet in the evening. 12/2/24   Dom Tamez MD   potassium chloride (KLOR-CON) 10 MEQ extended release tablet Take 1 tablet by mouth daily 12/2/24   Dom Tamez MD   vitamin D 25 MCG (1000 UT) CAPS Take 1 capsule by mouth Daily 12/2/24 1/1/25  Dom Taemz MD   aspirin 81 MG chewable tablet Take 1 tablet by mouth daily 12/7/24   Dom Tamez MD   apixaban (ELIQUIS) 5 MG TABS tablet Take 1 tablet by mouth 2 times daily 12/9/24   Dom Tamez MD      Problem List:   Active Problems:    * No active hospital problems. *  Resolved Problems:    * No resolved hospital problems. *       OBJECTIVE  Vital Signs:  Vitals:    01/01/25 2247   BP: (!) 171/74   Pulse: 74   Resp: 18   Temp: 97.9 °F (36.6 °C)   SpO2: 98%     Labs:  Reviewed. UDS: not available  ETOH: not available  EKG: not ordered  No results found for this or any previous visit (from the past  you were dead or wished you could go to sleep and not wake up?  No   2) Have you actually had any thoughts of killing yourself?  No   6) Have you ever done anything, started to do anything, or prepared to do anything to end your life? No    :      Overall Level Suicide Risk: TelePsych CSSRS Risk Level: Low Risk    Assessment:   Patient is a 69 y.o.  male who presents for psychosis.    Upon initial assessment, pt was manic with paranoid delsuions and ideas of references.    Tangential, jumps topics, loose associations, disorganized, decreased ADLs, poor insight and judgement.  Hyper verbal, rapid thoughts, decreased sleep.      Due to active mood lability with poor insight and judgment with psychotic features pt meets criteria for inpatient psychiatric hospital admission at this time.      Dx:   Bipolar 1 Disorder manic with psychotic features    Plan:  Inpatient psychiatric admission at appropriate care level facility, once medically cleared and stable  Legal Status: INVOLUNTARY.  Patient is in active state of psychosis.   Sitter, suicide and elopement precautions.  Medical co-morbidities: Management per medical providers, appreciate assistance.  Medication Recommendations: Haldol 5 mg PO Q 6 hours PRN agitation, May give IM if the patient refuses PO and is deemed to be an imminent danger to self or others at the time  and Ativan 0.5 mg PO q 4 PRN agitation  Reviewed treatment plan with patient including risks, benefits, alternatives, and side effects of medications, and any/all black box warnings. Patient verbalized understanding.  Patient had an opportunity to ask questions and address concerns. Obtained informed consent for treatment.  Medical records, labs, and diagnostic tests reviewed.   Re-consult for any new changes or concerns. Thank you for this consult.  Discussed recommendations with Marybeth JORDAN at time of consult completion.    TelePsych recommendations:Inpatient psychiatric admission    Legal

## 2025-01-02 NOTE — ED NOTES
PT accepted to [facility]     Accepting provider is Dr Arden Ocasio    N2N is [phone]    Room # Rm 210    Transport time is [facility] 3254
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
## 2025-01-03 LAB
CHOLEST SERPL-MCNC: 177 MG/DL (ref 125–199)
HDLC SERPL-MCNC: 89 MG/DL
LDLC SERPL CALC-MCNC: 77 MG/DL
TRIGL SERPL-MCNC: 53 MG/DL
TSH SERPL DL<=0.05 MIU/L-ACNC: 1.04 UIU/ML (ref 0.27–4.2)

## 2025-01-07 ENCOUNTER — HOSPITAL ENCOUNTER (OUTPATIENT)
Age: 70
Setting detail: SPECIMEN
Discharge: HOME | End: 2025-01-07

## 2025-01-07 LAB
LITHIUM DATE LAST DOSE: NORMAL
LITHIUM DOSE AMOUNT: NORMAL
LITHIUM DOSE TIME: NORMAL
LITHIUM LEVEL: 0.7 MMOL/L (ref 0.6–1.2)

## 2025-01-07 PROCEDURE — 80178 ASSAY OF LITHIUM: CPT

## 2025-01-10 ENCOUNTER — HOSPITAL ENCOUNTER (OUTPATIENT)
Age: 70
Setting detail: SPECIMEN
Discharge: HOME | End: 2025-01-10

## 2025-01-18 ENCOUNTER — HOSPITAL ENCOUNTER (OUTPATIENT)
Age: 70
Setting detail: SPECIMEN
Discharge: HOME | End: 2025-01-18

## 2025-01-18 LAB
LITHIUM DATE LAST DOSE: NORMAL
LITHIUM DOSE AMOUNT: NORMAL
LITHIUM DOSE TIME: NORMAL
LITHIUM LEVEL: 0.6 MMOL/L (ref 0.6–1.2)

## 2025-01-18 PROCEDURE — 80178 ASSAY OF LITHIUM: CPT

## 2025-01-21 ENCOUNTER — HOSPITAL ENCOUNTER (EMERGENCY)
Age: 70
Discharge: HOME | End: 2025-01-21
Payer: MEDICARE

## 2025-01-21 VITALS
OXYGEN SATURATION: 95 % | HEART RATE: 94 BPM | SYSTOLIC BLOOD PRESSURE: 146 MMHG | TEMPERATURE: 98.2 F | DIASTOLIC BLOOD PRESSURE: 79 MMHG | RESPIRATION RATE: 18 BRPM

## 2025-01-21 DIAGNOSIS — Z13.9 ENCOUNTER FOR MEDICAL SCREENING EXAMINATION: ICD-10-CM

## 2025-01-21 DIAGNOSIS — F31.9 BIPOLAR AFFECTIVE DISORDER, REMISSION STATUS UNSPECIFIED (HCC): Chronic | ICD-10-CM

## 2025-01-21 DIAGNOSIS — Z59.819 HOUSING INSECURITY: ICD-10-CM

## 2025-01-21 DIAGNOSIS — Z86.718 HISTORY OF DVT OF LOWER EXTREMITY: Primary | ICD-10-CM

## 2025-01-21 PROCEDURE — 90791 PSYCH DIAGNOSTIC EVALUATION: CPT | Performed by: COUNSELOR

## 2025-01-21 PROCEDURE — 99283 EMERGENCY DEPT VISIT LOW MDM: CPT

## 2025-01-21 SDOH — ECONOMIC STABILITY - HOUSING INSECURITY: HOUSING INSTABILITY UNSPECIFIED: Z59.819

## 2025-01-21 ASSESSMENT — PAIN - FUNCTIONAL ASSESSMENT
PAIN_FUNCTIONAL_ASSESSMENT: NONE - DENIES PAIN
PAIN_FUNCTIONAL_ASSESSMENT: NONE - DENIES PAIN

## 2025-01-22 ENCOUNTER — HOSPITAL ENCOUNTER (EMERGENCY)
Age: 70
Discharge: PSYCHIATRIC OR SUBSTANCE USE FACILITY | End: 2025-01-22
Attending: STUDENT IN AN ORGANIZED HEALTH CARE EDUCATION/TRAINING PROGRAM
Payer: MEDICARE

## 2025-01-22 VITALS
DIASTOLIC BLOOD PRESSURE: 104 MMHG | RESPIRATION RATE: 18 BRPM | TEMPERATURE: 97.9 F | OXYGEN SATURATION: 96 % | HEART RATE: 89 BPM | SYSTOLIC BLOOD PRESSURE: 165 MMHG

## 2025-01-22 DIAGNOSIS — F29 PSYCHOSIS, UNSPECIFIED PSYCHOSIS TYPE (HCC): Primary | ICD-10-CM

## 2025-01-22 LAB
ALBUMIN SERPL-MCNC: 3.8 G/DL (ref 3.4–5)
ALBUMIN/GLOB SERPL: 1.3 {RATIO} (ref 1.1–2.2)
ALP SERPL-CCNC: 105 U/L (ref 40–129)
ALT SERPL-CCNC: 27 U/L (ref 10–40)
AMPHET UR QL SCN: NEGATIVE
ANION GAP SERPL CALCULATED.3IONS-SCNC: 10 MMOL/L (ref 9–17)
APAP SERPL-MCNC: <5 UG/ML (ref 10–30)
AST SERPL-CCNC: 40 U/L (ref 15–37)
BARBITURATES UR QL SCN: POSITIVE
BASOPHILS # BLD: 0.04 K/UL
BASOPHILS NFR BLD: 0 % (ref 0–1)
BENZODIAZ UR QL: NEGATIVE
BILIRUB SERPL-MCNC: 0.6 MG/DL (ref 0–1)
BUN SERPL-MCNC: 22 MG/DL (ref 7–20)
CALCIUM SERPL-MCNC: 9.3 MG/DL (ref 8.3–10.6)
CANNABINOIDS UR QL SCN: NEGATIVE
CHLORIDE SERPL-SCNC: 103 MMOL/L (ref 99–110)
CO2 SERPL-SCNC: 27 MMOL/L (ref 21–32)
COCAINE UR QL SCN: NEGATIVE
CREAT SERPL-MCNC: 1 MG/DL (ref 0.8–1.3)
EOSINOPHIL # BLD: 0.25 K/UL
EOSINOPHILS RELATIVE PERCENT: 2 % (ref 0–3)
ERYTHROCYTE [DISTWIDTH] IN BLOOD BY AUTOMATED COUNT: 15.3 % (ref 11.7–14.9)
ETHANOLAMINE SERPL-MCNC: <10 MG/DL (ref 0–0.08)
FENTANYL UR QL: NEGATIVE
GFR, ESTIMATED: 76 ML/MIN/1.73M2
GLUCOSE SERPL-MCNC: 93 MG/DL (ref 74–99)
HCT VFR BLD AUTO: 40.3 % (ref 42–52)
HGB BLD-MCNC: 12.6 G/DL (ref 13.5–18)
IMM GRANULOCYTES # BLD AUTO: 0.05 K/UL
IMM GRANULOCYTES NFR BLD: 1 %
LYMPHOCYTES NFR BLD: 1.05 K/UL
LYMPHOCYTES RELATIVE PERCENT: 10 % (ref 24–44)
MCH RBC QN AUTO: 28.6 PG (ref 27–31)
MCHC RBC AUTO-ENTMCNC: 31.3 G/DL (ref 32–36)
MCV RBC AUTO: 91.4 FL (ref 78–100)
MONOCYTES NFR BLD: 0.68 K/UL
MONOCYTES NFR BLD: 6 % (ref 0–4)
NEUTROPHILS NFR BLD: 81 % (ref 36–66)
NEUTS SEG NFR BLD: 8.75 K/UL
OPIATES UR QL SCN: NEGATIVE
OXYCODONE UR QL SCN: NEGATIVE
PLATELET # BLD AUTO: 196 K/UL (ref 140–440)
PMV BLD AUTO: 9.2 FL (ref 7.5–11.1)
POTASSIUM SERPL-SCNC: 4.1 MMOL/L (ref 3.5–5.1)
PROT SERPL-MCNC: 6.8 G/DL (ref 6.4–8.2)
RBC # BLD AUTO: 4.41 M/UL (ref 4.6–6.2)
SALICYLATES SERPL-MCNC: <0.5 MG/DL (ref 15–30)
SODIUM SERPL-SCNC: 139 MMOL/L (ref 136–145)
TEST INFORMATION: ABNORMAL
WBC OTHER # BLD: 10.8 K/UL (ref 4–10.5)

## 2025-01-22 PROCEDURE — 99285 EMERGENCY DEPT VISIT HI MDM: CPT

## 2025-01-22 PROCEDURE — 85025 COMPLETE CBC W/AUTO DIFF WBC: CPT

## 2025-01-22 PROCEDURE — 80143 DRUG ASSAY ACETAMINOPHEN: CPT

## 2025-01-22 PROCEDURE — 80053 COMPREHEN METABOLIC PANEL: CPT

## 2025-01-22 PROCEDURE — 80307 DRUG TEST PRSMV CHEM ANLYZR: CPT

## 2025-01-22 PROCEDURE — G0480 DRUG TEST DEF 1-7 CLASSES: HCPCS

## 2025-01-22 PROCEDURE — 80179 DRUG ASSAY SALICYLATE: CPT

## 2025-01-22 NOTE — ED PROVIDER NOTES
[facility] EMERGENCY DEPARTMENT  EMERGENCY DEPARTMENT ENCOUNTER        Pt Name: Jhonatan Cameron  MRN: [id number]  Birthdate 1955  Date of evaluation: 1/21/2025  Provider: Sonu Allen PA-C  PCP: Kaleb Ziegler APRN - CNP  Note Started: 9:38 PM EST 1/21/25      MASSIEL. I have evaluated this patient.        CHIEF COMPLAINT       Chief Complaint   Patient presents with    Homeless    Peripheral Neuropathy       HISTORY OF PRESENT ILLNESS: 1 or more Elements     Jhonatan Cameron is a 69 y.o. male with housing insecurity, bipolar disorder, lipidemia, hypertension, hyperlipidemia, and history of DVT and IVC filter on Eliquis who presents after being kicked out of a warming center without any acute complaints.  Patient denies any medical complaints to me, does not appear to be acutely psychotic, denies any SI, HI, no flight of ideas, no pressured speech.    Nursing Notes were all reviewed and agreed with or any disagreements were addressed in the HPI.    Historians other than the patient: none    Limitations to history : None    Social determinants of health that will affect patient's care are:  Poor Health Literacy (Additional Time Provided in Explanation)  Homelessness (Provided Resources)      I reviewed and interpreted prior non-ED medical record specialty: none    PAST MEDICAL HISTORY      has a past medical history of Arthritis, Bipolar 1 disorder (HCC), COPD (chronic obstructive pulmonary disease) ([facility]), blood clots, Hyperlipidemia, Hypertension, Neuropathy, Schizo affective schizophrenia (HCC), Tremors of nervous system, WD-Arterial insufficiency of lower extremity (HCC) (2/4/2021), WD-Cellulitis of left lower leg (2/4/2021), WD-Venous insufficiency of both lower extremities (2/4/2021), and WDCellulitis of leg, right (2/4/2021).     CURRENTMEDICATIONS       Previous Medications    APIXABAN (ELIQUIS) 5 MG TABS TABLET    Take 1 tablet by mouth 2 times daily    ASPIRIN 81 MG CHEWABLE

## 2025-01-22 NOTE — VIRTUAL HEALTH
Emily christine [phone] & [phone] (no answer)  Please refer the patient to a psychiatrist and therapist for continued care and support after discharge and The patient is cleared to be discharged from a psychiatric point of view, when medically appropriate.  Patient does not meet criteria for a psychiatric hold at this time  Safety plan created and reviewed with patient, see below for details  Re-consult for any new changes or concerns. Thank you for this consult.  Discussed recommendations with PIETRO Allen at time of consult completion.    TelePsych recommendations:Discharge      Safety Plan:  updated        Electronically signed by RAAD Gallo on 1/21/2025 at 9:33 PM.       Jhonatan Cameron, was evaluated through a synchronous (real-time) audio-video encounter. The patient (and/or guardian if applicable) is aware that this is a billable service, which includes applicable co-pays. This virtual visit was conducted with patient's (and/or legal guardian's) consent. Patient identification was verified, and a caregiver was present when appropriate.  The patient was located at Facility (Appt Department): [facility] EMERGENCY DEPARTMENT  [address] [place] [address]  Loc: [phone]  The provider was located at Home (City/State): [place]  Confirm you are appropriately licensed, registered, or certified to deliver care in the state where the patient is located as indicated above. If you are not or unsure, please re-schedule the visit: Yes, I confirm.   [place] Consult to Tele-Psych  Consult performed by: Ashley Farrell LISW  Consult ordered by: Sonu Allen PA-C  Reason for consult: psychosis           Total time spent on this encounter: Not billed by time    --RAAD Gallo on 1/21/2025 at 9:31 PM    An electronic signature was used to authenticate this note.

## 2025-01-22 NOTE — VIRTUAL HEALTH
Jhonatan Cameron  [id number]  1955     Social Work Behavioral Health Crisis Assessment    01/22/25    Chief Complaint: Acute psychosis    HPI: Patient is a 69 y.o. White (non-) male who presents for mental health evaluation. Patient presented to the ED on 01/22/25 from homelessness.      Upon assessment, patient was dressed in street and was resting comfortably on the hospital bed. He was alert, oriented, cooperative, distractible, and manic. He denied experiencing any auditory or visual hallucinations. Patient is experiencing acute psychosis. \"My heart is my home\" \"I saved the Earth from destruction\" \"[place] is the center of the universe\" \"I'm not a cyborg\" \"I'm invisible. I'm one day and 10 minutes ahead of other people.\"    Past Psychiatric History:  Previous Diagnoses/symptoms: Schizoaffective Disorder, Schizophrenia, Bipolar 1, Bev, Anxiety  Previous suicide attempts/self-harm: Denies  Inpatient psychiatric hospitalizations: yes; most recent 1/2/25  Current outpatient psychiatric provider: Denies  Current therapist: Denies  Previous psychiatric medication trials: Thorazine   Current psychiatric medications: Depakote, Lithium, Seroquel, Risperdal   Family Psychiatric History: mother's side     Sleep Hours: \"I sleep when I'm sleepy. I stay awake when I'm awake.\"     Sleep concerns: denies     Use of sleep medications: denies     Substance Abuse History:  Tobacco: Denies  Alcohol: Denies  Marijuana: Denies  Stimulant: Denies  Opiates: Denies  Benzodiazepine: Denies  Other illicit drug usage: Denies  History of substance/alcohol abuse treatment: Denies     Social History:  Education: College degree  Living Situation/Interest: homeless; per chart cannot return to [facility]  Marital/Committed relationship and parenting hx: single  Occupation: Unemployed  Legal History/Hx of Violence: Denies  Spiritual History: Denies  Psychological trauma, neglect, or abuse: denies hx of trauma/abuse   Access

## 2025-01-22 NOTE — ED NOTES
Transfer Center Handoff for Behavioral Health Transfers      Patient's Current Location: [facility] EMERGENCY DEPARTMENT     Chief Complaint   Patient presents with    Mental Health Problem       Current or History of Violent Behavior: No    Currently in Restraints Now or During this Encounter: Yes  (Specify if Agitation or self harm is noted in ED?)  If yes, please describe behaviors requiring restraint:             Medical Clearance Documented and Verified in the Chart: Yes    Allergies   Allergen Reactions    Thorazine [Chlorpromazine] Other (See Comments)     \"makes me do strange things\"        Can Patient Tolerate Lying Flat: Yes    Able to Perform ADLs:  Yes  (Specify if able to ambulate or uses any mobility devices such as cane or walker)  Activity: In bed  Level of Assistance:    Assistive Device:    Miscellaneous Devices:      LABS    CBC:   Lab Results   Component Value Date/Time    WBC 8.2 01/02/2025 12:45 AM    RBC 5.11 01/02/2025 12:45 AM    HGB 14.6 01/02/2025 12:45 AM    HCT 46.0 01/02/2025 12:45 AM    MCV 90.0 01/02/2025 12:45 AM    MCH 28.6 01/02/2025 12:45 AM    MCHC 31.7 01/02/2025 12:45 AM    RDW 14.4 01/02/2025 12:45 AM     01/02/2025 12:45 AM    MPV 9.5 01/02/2025 12:45 AM     CMP:   Lab Results   Component Value Date/Time     01/02/2025 12:45 AM    K 4.1 01/02/2025 12:45 AM     01/02/2025 12:45 AM    CO2 29 01/02/2025 12:45 AM    BUN 23 01/02/2025 12:45 AM    CREATININE 0.9 01/02/2025 12:45 AM    GFRAA >60 06/17/2022 11:01 AM    LABGLOM 82 01/02/2025 12:45 AM    LABGLOM >60 11/30/2023 11:20 AM    GLUCOSE 97 01/02/2025 12:50 AM    CALCIUM 10.3 01/02/2025 12:45 AM    BILITOT 0.3 01/02/2025 12:45 AM    ALKPHOS 118 01/02/2025 12:45 AM    AST 27 01/02/2025 12:45 AM    ALT 14 01/02/2025 12:45 AM     Drug Panel:   Lab Results   Component Value Date/Time    OPIAU NEGATIVE 01/02/2025 12:18 AM     UA:  Lab Results   Component Value Date/Time    COLORU Yellow 11/30/2024

## 2025-01-22 NOTE — PROGRESS NOTES
came to talk with patient about his situation. This RN observed patient becoming agitated and telling SW that he wants to stay here in the hospital. When SW explained that he cannot \"check in\" to the hospital, pt did curse at . Security out to talk with patient and patient is calm at this time. Pt states he does have neuropathy in BLE

## 2025-01-22 NOTE — CARE COORDINATION
Patient was in waiting room tonight as he was asked to leave [employer] [facility]. RN reported that [facility] called 911 on patient and requested that patient be picked up and taken to hospital.     Patient came to hospital. CM out to lobby to speak to him. Patient only person in lobby. CM asked patient if his name was correct and introduced self and explained job role. CM asked patient if patient had been in a shelter and had been asked to leave. Patient stated that he had and that he was asked to leave because patient stated that the he had to leave the shelter because the shelter was to full. (But according to the RN; [facility] had to call the police and have patient removed and brought to the ED)    Patient then said that he was told to come to the ED by [facility] because he knows that we have a \"Mental Health floor upstairs and that he has stayed in particular room on numerous occasions upstairs and he just wants us to put him up there since he has no place to go tonight.\" CM explained that the hospital does not have a mental health floor. Patient reports that this CM was lying. Patient said that CM \"can just straight to hell and burn in hell.\" CM explained that perhaps patient may have a medical condition that would warrant him to be admitted, but patient would not be able to just stay at ED because he was ask to leave [employer] [facility]. Patient then said that he has neuropathy and that the hospital had better admit him with his condition. CM explained that the physician would see him and better be able to do an assessment on his medical condition. Patient then said, \"Well... I know that you are lying about not having a mental health floor because I have stayed up there many many times and you need to watch yourself because you don't know who I am... I am Eiinsteins' brother and a nephew to Brook and you don't want to be an enemy with Brook. Ohhh it won't matter anyway because you will be burning in hell because you can
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
## 2025-01-22 NOTE — ED TRIAGE NOTES
EMS called to [facility] (homeless shelter) to  patient and stated that patient cannot return. EMS reports denies any complaints. No treatment en route

## 2025-01-22 NOTE — ED NOTES
Patient talking about things that are not really here, Patient states he is in heaven as we speak and goes on to tell me he has traveled as fast as the speed of light. Patient mumbling about lots of different topics at this time.

## 2025-01-22 NOTE — ED PROVIDER NOTES
Emergency Department Encounter      Patient: Jhonatan Cameron  MRN: [id number]  : 1955  Date of Evaluation: 2025  PCP: Kaleb Ziegler APRN - CNP  ED Provider:  Fortino Pompa DO    Triage Chief Complaint:    Mental Health Problem    HPI:   Jhonatan Cameron is a 69 y.o. male that presents with concerns of mental health abnormality.  Patient has history of bipolar disorder, history of DVT on anticoagulation, schizoaffective disorder, hyperlipidemia, COPD as well as others listed in past medical history.  Unfortunately, patient is homeless, he has unfortunately been kicked out of multiple homeless shelters and warming centers around the area.  Patient was seen in the emergency department yesterday with concerns of homelessness and peripheral neuropathy.  Was evaluated by psychiatry at that time and felt that patient did not require inpatient placement.  Was evaluated by social work as well.  Patient was discharged.    It sounds as if patient went back to the homeless shelter, there was some concern for potential trespassing.  He was taken to [facility].  Patient's counselor picked him up from [facility] today and then brought him back to the emergency department for further evaluation.  Please see social work's note for further clarification.    When I asked the patient why he came to the emergency department, he states to see what is going on.  Patient tells me that he he is the son [facility].  Patient also tells me that he knows [employer] and is very good at art work.  According to his counselor, he did recently receive all of his medications yesterday.  The patient himself declines any alcohol use or other substance use.  He is declining any homicidal or suicidal ideation at this time but does report intermittent auditory and visual hallucinations.    When asked about somatic complaints, he states he needs bilateral knee replacements and has been struggling with his lower extremity neuropathy.  Both

## 2025-01-23 NOTE — ED NOTES
Report called to the RN at [facility] that will be continuing the care of this pt after transfer.

## 2025-01-27 ENCOUNTER — HOSPITAL ENCOUNTER (EMERGENCY)
Age: 70
Discharge: PSYCHIATRIC OR SUBSTANCE USE FACILITY | End: 2025-01-28
Attending: EMERGENCY MEDICINE
Payer: MEDICARE

## 2025-01-27 DIAGNOSIS — F29 PSYCHOSIS, UNSPECIFIED PSYCHOSIS TYPE (HCC): ICD-10-CM

## 2025-01-27 DIAGNOSIS — F20.9 SCHIZOPHRENIA, UNSPECIFIED TYPE (HCC): Primary | ICD-10-CM

## 2025-01-27 LAB
ALBUMIN SERPL-MCNC: 3.9 G/DL (ref 3.4–5)
ALBUMIN/GLOB SERPL: 1.3 {RATIO} (ref 1.1–2.2)
ALP SERPL-CCNC: 104 U/L (ref 40–129)
ALT SERPL-CCNC: 21 U/L (ref 10–40)
AMPHET UR QL SCN: NEGATIVE
ANION GAP SERPL CALCULATED.3IONS-SCNC: 10 MMOL/L (ref 9–17)
APAP SERPL-MCNC: <5 UG/ML (ref 10–30)
AST SERPL-CCNC: 30 U/L (ref 15–37)
BARBITURATES UR QL SCN: POSITIVE
BASOPHILS # BLD: 0.04 K/UL
BASOPHILS NFR BLD: 1 % (ref 0–1)
BENZODIAZ UR QL: NEGATIVE
BILIRUB SERPL-MCNC: 0.4 MG/DL (ref 0–1)
BILIRUB UR QL STRIP: NEGATIVE
BUN SERPL-MCNC: 21 MG/DL (ref 7–20)
CALCIUM SERPL-MCNC: 9.2 MG/DL (ref 8.3–10.6)
CANNABINOIDS UR QL SCN: NEGATIVE
CHLORIDE SERPL-SCNC: 99 MMOL/L (ref 99–110)
CLARITY UR: CLEAR
CO2 SERPL-SCNC: 30 MMOL/L (ref 21–32)
COCAINE UR QL SCN: NEGATIVE
COLOR UR: YELLOW
CREAT SERPL-MCNC: 0.9 MG/DL (ref 0.8–1.3)
EOSINOPHIL # BLD: 0.27 K/UL
EOSINOPHILS RELATIVE PERCENT: 4 % (ref 0–3)
ERYTHROCYTE [DISTWIDTH] IN BLOOD BY AUTOMATED COUNT: 15.2 % (ref 11.7–14.9)
ETHANOLAMINE SERPL-MCNC: <10 MG/DL (ref 0–0.08)
FENTANYL UR QL: NEGATIVE
GFR, ESTIMATED: 83 ML/MIN/1.73M2
GLUCOSE SERPL-MCNC: 103 MG/DL (ref 74–99)
GLUCOSE UR STRIP-MCNC: NEGATIVE MG/DL
HCT VFR BLD AUTO: 39.3 % (ref 42–52)
HGB BLD-MCNC: 12.6 G/DL (ref 13.5–18)
HGB UR QL STRIP.AUTO: ABNORMAL
IMM GRANULOCYTES # BLD AUTO: 0.05 K/UL
IMM GRANULOCYTES NFR BLD: 1 %
KETONES UR STRIP-MCNC: NEGATIVE MG/DL
LEUKOCYTE ESTERASE UR QL STRIP: NEGATIVE
LYMPHOCYTES NFR BLD: 0.96 K/UL
LYMPHOCYTES RELATIVE PERCENT: 14 % (ref 24–44)
MCH RBC QN AUTO: 29.3 PG (ref 27–31)
MCHC RBC AUTO-ENTMCNC: 32.1 G/DL (ref 32–36)
MCV RBC AUTO: 91.4 FL (ref 78–100)
MONOCYTES NFR BLD: 0.57 K/UL
MONOCYTES NFR BLD: 8 % (ref 0–4)
NEUTROPHILS NFR BLD: 73 % (ref 36–66)
NEUTS SEG NFR BLD: 5.18 K/UL
NITRITE UR QL STRIP: NEGATIVE
OPIATES UR QL SCN: NEGATIVE
OXYCODONE UR QL SCN: NEGATIVE
PH UR STRIP: 6.5 [PH] (ref 5–8)
PLATELET # BLD AUTO: 163 K/UL (ref 140–440)
PMV BLD AUTO: 9.6 FL (ref 7.5–11.1)
POTASSIUM SERPL-SCNC: 4 MMOL/L (ref 3.5–5.1)
PROT SERPL-MCNC: 7 G/DL (ref 6.4–8.2)
PROT UR STRIP-MCNC: NEGATIVE MG/DL
RBC # BLD AUTO: 4.3 M/UL (ref 4.6–6.2)
RBC #/AREA URNS HPF: 1 /HPF (ref 0–2)
SALICYLATES SERPL-MCNC: <0.5 MG/DL (ref 15–30)
SODIUM SERPL-SCNC: 139 MMOL/L (ref 136–145)
SP GR UR STRIP: <1.005 (ref 1–1.03)
TEST INFORMATION: ABNORMAL
UROBILINOGEN UR STRIP-ACNC: 1 EU/DL (ref 0–1)
WBC #/AREA URNS HPF: <1 /HPF (ref 0–5)
WBC OTHER # BLD: 7.1 K/UL (ref 4–10.5)

## 2025-01-27 PROCEDURE — 80179 DRUG ASSAY SALICYLATE: CPT

## 2025-01-27 PROCEDURE — 85025 COMPLETE CBC W/AUTO DIFF WBC: CPT

## 2025-01-27 PROCEDURE — 90791 PSYCH DIAGNOSTIC EVALUATION: CPT | Performed by: SOCIAL WORKER

## 2025-01-27 PROCEDURE — 99285 EMERGENCY DEPT VISIT HI MDM: CPT

## 2025-01-27 PROCEDURE — G0480 DRUG TEST DEF 1-7 CLASSES: HCPCS

## 2025-01-27 PROCEDURE — 80307 DRUG TEST PRSMV CHEM ANLYZR: CPT

## 2025-01-27 PROCEDURE — 80143 DRUG ASSAY ACETAMINOPHEN: CPT

## 2025-01-27 PROCEDURE — 80053 COMPREHEN METABOLIC PANEL: CPT

## 2025-01-27 PROCEDURE — 81001 URINALYSIS AUTO W/SCOPE: CPT

## 2025-01-27 PROCEDURE — 93005 ELECTROCARDIOGRAM TRACING: CPT | Performed by: EMERGENCY MEDICINE

## 2025-01-28 VITALS
OXYGEN SATURATION: 100 % | RESPIRATION RATE: 18 BRPM | SYSTOLIC BLOOD PRESSURE: 167 MMHG | HEART RATE: 80 BPM | DIASTOLIC BLOOD PRESSURE: 88 MMHG | TEMPERATURE: 98.6 F

## 2025-01-28 LAB
EKG ATRIAL RATE: 60 BPM
EKG DIAGNOSIS: NORMAL
EKG P AXIS: 70 DEGREES
EKG P-R INTERVAL: 158 MS
EKG Q-T INTERVAL: 466 MS
EKG QRS DURATION: 64 MS
EKG QTC CALCULATION (BAZETT): 466 MS
EKG R AXIS: -18 DEGREES
EKG T AXIS: 76 DEGREES
EKG VENTRICULAR RATE: 60 BPM

## 2025-01-28 PROCEDURE — 93010 ELECTROCARDIOGRAM REPORT: CPT | Performed by: INTERNAL MEDICINE

## 2025-01-28 NOTE — ED NOTES
Report called to Joni at [facility]. All questions answered at this time.     [facility] to bedside for transport. Report given and care transferred. Pt loaded onto cot without issue.

## 2025-01-28 NOTE — ED PROVIDER NOTES
EMERGENCY DEPARTMENT ENCOUNTER      CHIEF COMPLAINT:   Psychosis    HPI: Jhonatan Cameron is a 69 y.o. male who presents to the ED, via EMS, for a mental health evaluation.  The patient has a history of schizophrenia.  He was evidently at [employer] and was trying to kick the employees out.  He states he lives at [facility].  He states that we are currently in [facility].  He states that his name is [facility] and that his father is currently in [facility].  He is not aware that he is delusional.  He states he does not feel confused.  Symptoms are constant.  There are no exacerbating or alleviating factors.  He denies suicidal or homicidal ideation.  He denies any other complaints.    REVIEW OF SYSTEMS:   Constitutional:  Denies fever or chills  Eyes:  Denies change in visual acuity  HENT:  Denies nasal congestion or sore throat  Respiratory:  Denies cough or shortness of breath  Cardiovascular:  Denies chest pain or edema  GI:  Denies abdominal pain, nausea, vomiting, bloody stools or diarrhea  :  Denies dysuria  Musculoskeletal:  Denies back pain or joint pain  Integument:  Denies rash  Neurologic:  Denies headache, focal weakness or sensory changes  Psychiatric: See HPI  \"Remaining review of systems reviewed and negative. I have reviewed the nursing triage documentation and agree unless otherwise noted below.\"      PAST MEDICAL HISTORY:   Past Medical History:   Diagnosis Date    Arthritis     Bipolar 1 disorder (HCC)     COPD (chronic obstructive pulmonary disease) (HCC)     \"use to have copd but is better\"    Hx of blood clots     \"had blood clot in lung and in my legs- that was in 2016- on Eliquis\"    Hyperlipidemia     Hypertension     Neuropathy     Schizo affective schizophrenia (HCC)     \"follow with Dr GERHARD Ramirez at [facility]    Tremors of nervous system     WD-Arterial insufficiency of lower extremity (HCC) 2/4/2021    WD-Cellulitis of left lower leg 2/4/2021    WD-Venous insufficiency of both lower

## 2025-01-28 NOTE — ED NOTES
Transfer Center Handoff for Behavioral Health Transfers      Patient's Current Location: [facility] EMERGENCY DEPARTMENT     Chief Complaint   Patient presents with    Altered Mental Status     Reports that he is \"[facility]bjit [facility]\", states that he lives in [facility] and was trying to kick the employees out. Patient reports that [facility].        Current or History of Violent Behavior: Yes    Currently in Restraints Now or During this Encounter: No  (Specify if Agitation or self harm is noted in ED?)  If yes, please describe behaviors requiring restraint:             Medical Clearance Documented and Verified in the Chart: Yes    Allergies   Allergen Reactions    Thorazine [Chlorpromazine] Other (See Comments)     \"makes me do strange things\"        Can Patient Tolerate Lying Flat: Yes    Able to Perform ADLs:  Yes  (Specify if able to ambulate or uses any mobility devices such as cane or walker)  Activity: In bed  Level of Assistance:    Assistive Device:    Miscellaneous Devices:      LABS    CBC:   Lab Results   Component Value Date/Time    WBC 7.1 01/27/2025 07:10 PM    RBC 4.30 01/27/2025 07:10 PM    HGB 12.6 01/27/2025 07:10 PM    HCT 39.3 01/27/2025 07:10 PM    MCV 91.4 01/27/2025 07:10 PM    MCH 29.3 01/27/2025 07:10 PM    MCHC 32.1 01/27/2025 07:10 PM    RDW 15.2 01/27/2025 07:10 PM     01/27/2025 07:10 PM    MPV 9.6 01/27/2025 07:10 PM     CMP:   Lab Results   Component Value Date/Time     01/27/2025 07:10 PM    K 4.0 01/27/2025 07:10 PM    CL 99 01/27/2025 07:10 PM    CO2 30 01/27/2025 07:10 PM    BUN 21 01/27/2025 07:10 PM    CREATININE 0.9 01/27/2025 07:10 PM    GFRAA >60 06/17/2022 11:01 AM    LABGLOM 83 01/27/2025 07:10 PM    LABGLOM >60 11/30/2023 11:20 AM    GLUCOSE 103 01/27/2025 07:10 PM    CALCIUM 9.2 01/27/2025 07:10 PM    BILITOT 0.4 01/27/2025 07:10 PM    ALKPHOS 104 01/27/2025 07:10 PM    AST 30 01/27/2025 07:10 PM    ALT 21 01/27/2025 07:10

## 2025-01-28 NOTE — VIRTUAL HEALTH
Jhonatan Cameron  [id number]  1955     Social Work Behavioral Health Crisis Assessment    01/27/25    Chief Complaint: Mental Health Evaluation    HPI: Per EMR \"Jhonatan Cameron is a 69 y.o. male who presents to the ED, via EMS, for a mental health evaluation.  The patient has a history of schizophrenia.  He was evidently at [employer] and was trying to kick the employees out.  He states he lives at [facility].  He states that we are currently in [facility].  He states that [facility] and that his father is currently in [facility].  He is not aware that he is delusional.  He states he does not feel confused.  Symptoms are constant.  There are no exacerbating or alleviating factors.  He denies suicidal or homicidal ideation.  He denies any other complaints.\"    Past Psychiatric History:  Previous Diagnoses/symptoms: Schizoaffective Disorder, Schizophrenia, Bipolar 1, Bev, Anxiety  Previous suicide attempts/self-harm: Denies  Inpatient psychiatric hospitalizations: yes; most recent 1/22/25  Current outpatient psychiatric provider: Denies  Current therapist: Denies  Previous psychiatric medication trials: Thorazine   Current psychiatric medications: Depakote, Lithium, Seroquel, Risperdal   Family Psychiatric History: mother's side     Sleep Hours: \"I don't need rest. I am everything. I contain light.\"     Sleep concerns: denies     Use of sleep medications: denies     Substance Abuse History:  Tobacco: Denies  Alcohol: Denies  Marijuana: Denies  Stimulant: Denies  Opiates: Denies  Benzodiazepine: Denies  Other illicit drug usage: Denies  History of substance/alcohol abuse treatment: Denies     Social History:  Education: College degree  Living Situation/Interest: homeless; per chart cannot return to [facility]  Marital/Committed relationship and parenting hx: single  Occupation: Unemployed  Legal History/Hx of Violence: Denies  Spiritual History: Denies  Psychological trauma, neglect, or

## 2025-01-28 NOTE — ED NOTES
Patient redirected multi times. Patient has taken off gown and removed underwear. Patient making inappropriate comments to sitter at this time. Patient able to be redirected. Julio César gilbert RN notified.

## 2025-01-28 NOTE — ED PROVIDER NOTES
Emergency Department Encounter    Patient: Jhonatan Cameron  MRN: [id number]  : 1955  Date of Evaluation: 2025  ED Provider:  Albina Mcnair MD    Briefly, Jhonatan Camreon is a 69 y.o. male presented to the emergency department for psychiatric evaluation.  Seen by previous physician.  Without note for full HPI    I have reviewed and interpreted all of the currently available lab results from this visit (if applicable)  Results for orders placed or performed during the hospital encounter of 25   CBC with Auto Differential   Result Value Ref Range    WBC 7.1 4.0 - 10.5 k/uL    RBC 4.30 (L) 4.60 - 6.20 m/uL    Hemoglobin 12.6 (L) 13.5 - 18.0 g/dL    Hematocrit 39.3 (L) 42.0 - 52.0 %    MCV 91.4 78.0 - 100.0 fL    MCH 29.3 27.0 - 31.0 pg    MCHC 32.1 32.0 - 36.0 g/dL    RDW 15.2 (H) 11.7 - 14.9 %    Platelets 163 140 - 440 k/uL    MPV 9.6 7.5 - 11.1 fL    Neutrophils % 73 (H) 36 - 66 %    Lymphocytes % 14 (L) 24 - 44 %    Monocytes % 8 (H) 0 - 4 %    Eosinophils % 4 (H) 0.0 - 3.0 %    Basophils % 1 0 - 1 %    Immature Granulocytes % 1 (H) 0 %    Neutrophils Absolute 5.18 k/uL    Lymphocytes Absolute 0.96 k/uL    Monocytes Absolute 0.57 k/uL    Eosinophils Absolute 0.27 k/uL    Basophils Absolute 0.04 k/uL    Immature Granulocytes Absolute 0.05 k/uL   Comprehensive Metabolic Panel   Result Value Ref Range    Sodium 139 136 - 145 mmol/L    Potassium 4.0 3.5 - 5.1 mmol/L    Chloride 99 99 - 110 mmol/L    CO2 30 21 - 32 mmol/L    Anion Gap 10 9 - 17 mmol/L    Glucose 103 (H) 74 - 99 mg/dL    BUN 21 (H) 7 - 20 mg/dL    Creatinine 0.9 0.8 - 1.3 mg/dL    Est, Glom Filt Rate 83 >60 mL/min/1.73m2    Calcium 9.2 8.3 - 10.6 mg/dL    Total Protein 7.0 6.4 - 8.2 g/dL    Albumin 3.9 3.4 - 5.0 g/dL    Albumin/Globulin Ratio 1.3 1.1 - 2.2    Total Bilirubin 0.4 0.0 - 1.0 mg/dL    Alkaline Phosphatase 104 40 - 129 U/L    ALT 21 10 - 40 U/L    AST 30 15 - 37 U/L   Ethanol   Result Value Ref Range    Ethanol Lvl

## 2025-02-22 ENCOUNTER — HOSPITAL ENCOUNTER (EMERGENCY)
Age: 70
Discharge: HOME | End: 2025-02-22
Payer: MEDICARE

## 2025-02-22 VITALS
OXYGEN SATURATION: 97 % | TEMPERATURE: 98.4 F | HEIGHT: 71 IN | DIASTOLIC BLOOD PRESSURE: 86 MMHG | BODY MASS INDEX: 34.72 KG/M2 | SYSTOLIC BLOOD PRESSURE: 159 MMHG | RESPIRATION RATE: 16 BRPM | HEART RATE: 82 BPM | WEIGHT: 248 LBS

## 2025-02-22 DIAGNOSIS — Z76.0 ENCOUNTER FOR MEDICATION REFILL: Primary | ICD-10-CM

## 2025-02-22 DIAGNOSIS — R60.0 PERIPHERAL EDEMA: ICD-10-CM

## 2025-02-22 PROCEDURE — 99283 EMERGENCY DEPT VISIT LOW MDM: CPT

## 2025-02-22 PROCEDURE — 6370000000 HC RX 637 (ALT 250 FOR IP): Performed by: NURSE PRACTITIONER

## 2025-02-22 RX ORDER — POTASSIUM CHLORIDE 750 MG/1
10 TABLET, EXTENDED RELEASE ORAL DAILY
Qty: 30 TABLET | Refills: 1 | Status: SHIPPED | OUTPATIENT
Start: 2025-02-22

## 2025-02-22 RX ORDER — CARVEDILOL 3.12 MG/1
3.12 TABLET ORAL 2 TIMES DAILY WITH MEALS
Qty: 60 TABLET | Refills: 1 | Status: SHIPPED | OUTPATIENT
Start: 2025-02-22

## 2025-02-22 RX ORDER — POTASSIUM CHLORIDE 1500 MG/1
10 TABLET, EXTENDED RELEASE ORAL ONCE
Status: COMPLETED | OUTPATIENT
Start: 2025-02-22 | End: 2025-02-22

## 2025-02-22 RX ORDER — PRIMIDONE 50 MG/1
50 TABLET ORAL ONCE
Status: COMPLETED | OUTPATIENT
Start: 2025-02-22 | End: 2025-02-22

## 2025-02-22 RX ORDER — FUROSEMIDE 40 MG/1
40 TABLET ORAL 2 TIMES DAILY
Qty: 60 TABLET | Refills: 1 | Status: SHIPPED | OUTPATIENT
Start: 2025-02-22

## 2025-02-22 RX ORDER — FUROSEMIDE 20 MG/1
40 TABLET ORAL ONCE
Status: COMPLETED | OUTPATIENT
Start: 2025-02-22 | End: 2025-02-22

## 2025-02-22 RX ORDER — CARVEDILOL 6.25 MG/1
3.12 TABLET ORAL ONCE
Status: COMPLETED | OUTPATIENT
Start: 2025-02-22 | End: 2025-02-22

## 2025-02-22 RX ORDER — ACETAMINOPHEN 500 MG
1000 TABLET ORAL EVERY 6 HOURS PRN
Qty: 120 TABLET | Refills: 0 | Status: SHIPPED | OUTPATIENT
Start: 2025-02-22

## 2025-02-22 RX ORDER — IBUPROFEN 600 MG/1
600 TABLET, FILM COATED ORAL ONCE
Status: COMPLETED | OUTPATIENT
Start: 2025-02-22 | End: 2025-02-22

## 2025-02-22 RX ORDER — ASPIRIN 81 MG/1
81 TABLET, CHEWABLE ORAL DAILY
Qty: 30 TABLET | Refills: 1 | Status: SHIPPED | OUTPATIENT
Start: 2025-02-22

## 2025-02-22 RX ADMIN — PRIMIDONE 50 MG: 50 TABLET ORAL at 21:14

## 2025-02-22 RX ADMIN — IBUPROFEN 600 MG: 600 TABLET, FILM COATED ORAL at 21:13

## 2025-02-22 RX ADMIN — APIXABAN 5 MG: 5 TABLET, FILM COATED ORAL at 21:14

## 2025-02-22 RX ADMIN — FUROSEMIDE 40 MG: 20 TABLET ORAL at 21:13

## 2025-02-22 RX ADMIN — POTASSIUM CHLORIDE 10 MEQ: 1500 TABLET, EXTENDED RELEASE ORAL at 21:13

## 2025-02-22 RX ADMIN — CARVEDILOL 3.12 MG: 6.25 TABLET, FILM COATED ORAL at 21:13

## 2025-02-22 ASSESSMENT — PAIN DESCRIPTION - ORIENTATION: ORIENTATION: RIGHT;LEFT

## 2025-02-22 ASSESSMENT — PAIN - FUNCTIONAL ASSESSMENT: PAIN_FUNCTIONAL_ASSESSMENT: 0-10

## 2025-02-22 ASSESSMENT — PAIN SCALES - GENERAL: PAINLEVEL_OUTOF10: 6

## 2025-02-22 ASSESSMENT — PAIN DESCRIPTION - LOCATION: LOCATION: LEG

## 2025-02-23 NOTE — CARE COORDINATION
CM consult for discharge planning for this pt who is homeless. Pt has a [facility]  who has attempted to assist this pt with is mental and social needs.  Pt has Medicare and [place] Medicaid insurance. Pt will have no cost for his medications.  CM visited pt who is alert and oriented. I introduced self and reason for visit. Pt confirms he has dual insurance and can afford his medication. CM asked pt if he wanted his prescriptions printed so he could take them to the pharmacy that is closest to where he stays and he can get them filled tomorrow. Pt states yes printing his prescriptions would be helpful so he can fill them tomorrow during pharmacy hours.    Pt has been cleared for discharge, this CM/RN explained that his RN would be in with his discharge paperwork. Pt verbalized understanding.    POC is for discharge. JULIAN SILVA/CAPRI

## 2025-02-23 NOTE — ED PROVIDER NOTES
[facility] EMERGENCY DEPARTMENT  EMERGENCY DEPARTMENT ENCOUNTER        Pt Name: Jhonatan Cameron  MRN: [id number]  Birthdate 1955  Date of evaluation: 2/22/2025  Provider: EMPERATRIZ CINTRON CNP  PCP: Kaleb Ziegler APRN - CNP    MASSIEL. I have evaluated this patient.        Triage CHIEF COMPLAINT       Chief Complaint   Patient presents with    Leg Swelling         HISTORY OF PRESENT ILLNESS      Chief Complaint: leg swelling, medication refill     Jhonatan Cameron is a 69 y.o. male who presents for evaluation due to worsening lower extremity edema with need for medication refill.  Patient admits that he is currently homeless and states that he ran out of his medications 3 to 4 days ago and has not been taking his Lasix and therefore swelling has gotten worse.  He denies any shortness of breath, chest pain.  He does have a history of bilateral lower extremity lymphedema.  He is also out of several other of his chronic medications.  He states he is not taking any psychiatric medications at this time.    Nursing Notes were all reviewed and agreed with or any disagreements were addressed in the HPI.    REVIEW OF SYSTEMS     Pertinent ROS as noted in HPI.      PAST MEDICAL HISTORY     Past Medical History:   Diagnosis Date    Arthritis     Bipolar 1 disorder (HCC)     COPD (chronic obstructive pulmonary disease) ([facility])     \"use to have copd but is better\"    Hx of blood clots     \"had blood clot in lung and in my legs- that was in 2016- on Eliquis\"    Hyperlipidemia     Hypertension     Neuropathy     Schizo affective schizophrenia (HCC)     \"follow with Dr GERHARD Ramirez at [facility]    Tremors of nervous system     WD-Arterial insufficiency of lower extremity (HCC) 2/4/2021    WD-Cellulitis of left lower leg 2/4/2021    WD-Venous insufficiency of both lower extremities 2/4/2021    WDCellulitis of leg, right 2/4/2021       SURGICAL HISTORY     Past Surgical History:   Procedure Laterality Date

## 2025-02-23 NOTE — ED TRIAGE NOTES
Pt to the ED via EMS with c/o bilateral leg swelling. Pt is homeless and has been walking around outside all day. Pt states that he has not taken his lasix for the last five days.

## 2025-02-24 ENCOUNTER — HOSPITAL ENCOUNTER (EMERGENCY)
Age: 70
Discharge: PSYCHIATRIC OR SUBSTANCE USE FACILITY | End: 2025-02-25
Payer: MEDICARE

## 2025-02-24 DIAGNOSIS — F31.10 BIPOLAR I DISORDER WITH MANIA (HCC): Primary | ICD-10-CM

## 2025-02-24 LAB
ALBUMIN SERPL-MCNC: 3.2 G/DL (ref 3.4–5)
ALBUMIN/GLOB SERPL: 1.3 {RATIO} (ref 1.1–2.2)
ALP SERPL-CCNC: 79 U/L (ref 40–129)
ALT SERPL-CCNC: 16 U/L (ref 10–40)
AMPHET UR QL SCN: NEGATIVE
ANION GAP SERPL CALCULATED.3IONS-SCNC: 7 MMOL/L (ref 9–17)
APAP SERPL-MCNC: 10 UG/ML (ref 10–30)
AST SERPL-CCNC: 21 U/L (ref 15–37)
BARBITURATES UR QL SCN: NEGATIVE
BASOPHILS # BLD: 0.04 K/UL
BASOPHILS NFR BLD: 1 % (ref 0–1)
BENZODIAZ UR QL: NEGATIVE
BILIRUB SERPL-MCNC: 0.3 MG/DL (ref 0–1)
BUN SERPL-MCNC: 22 MG/DL (ref 7–20)
CALCIUM SERPL-MCNC: 8.4 MG/DL (ref 8.3–10.6)
CANNABINOIDS UR QL SCN: NEGATIVE
CHLORIDE SERPL-SCNC: 107 MMOL/L (ref 99–110)
CO2 SERPL-SCNC: 27 MMOL/L (ref 21–32)
COCAINE UR QL SCN: NEGATIVE
CREAT SERPL-MCNC: 0.8 MG/DL (ref 0.8–1.3)
EOSINOPHIL # BLD: 0.36 K/UL
EOSINOPHILS RELATIVE PERCENT: 6 % (ref 0–3)
ERYTHROCYTE [DISTWIDTH] IN BLOOD BY AUTOMATED COUNT: 14.8 % (ref 11.7–14.9)
ETHANOLAMINE SERPL-MCNC: <10 MG/DL (ref 0–0.08)
FENTANYL UR QL: NEGATIVE
GFR, ESTIMATED: 90 ML/MIN/1.73M2
GLUCOSE SERPL-MCNC: 83 MG/DL (ref 74–99)
HCT VFR BLD AUTO: 37.8 % (ref 42–52)
HGB BLD-MCNC: 11.9 G/DL (ref 13.5–18)
IMM GRANULOCYTES # BLD AUTO: 0.04 K/UL
IMM GRANULOCYTES NFR BLD: 1 %
LYMPHOCYTES NFR BLD: 1.18 K/UL
LYMPHOCYTES RELATIVE PERCENT: 19 % (ref 24–44)
MCH RBC QN AUTO: 29.3 PG (ref 27–31)
MCHC RBC AUTO-ENTMCNC: 31.5 G/DL (ref 32–36)
MCV RBC AUTO: 93.1 FL (ref 78–100)
MONOCYTES NFR BLD: 0.5 K/UL
MONOCYTES NFR BLD: 8 % (ref 0–4)
NEUTROPHILS NFR BLD: 65 % (ref 36–66)
NEUTS SEG NFR BLD: 3.96 K/UL
OPIATES UR QL SCN: NEGATIVE
OXYCODONE UR QL SCN: NEGATIVE
PLATELET, FLUORESCENCE: 130 K/UL (ref 140–440)
PMV BLD AUTO: 10 FL (ref 7.5–11.1)
POTASSIUM SERPL-SCNC: 4 MMOL/L (ref 3.5–5.1)
PROT SERPL-MCNC: 5.7 G/DL (ref 6.4–8.2)
RBC # BLD AUTO: 4.06 M/UL (ref 4.6–6.2)
SALICYLATES SERPL-MCNC: <0.5 MG/DL (ref 15–30)
SODIUM SERPL-SCNC: 140 MMOL/L (ref 136–145)
TEST INFORMATION: NORMAL
WBC OTHER # BLD: 6.1 K/UL (ref 4–10.5)

## 2025-02-24 PROCEDURE — 80143 DRUG ASSAY ACETAMINOPHEN: CPT

## 2025-02-24 PROCEDURE — 99285 EMERGENCY DEPT VISIT HI MDM: CPT

## 2025-02-24 PROCEDURE — G0480 DRUG TEST DEF 1-7 CLASSES: HCPCS

## 2025-02-24 PROCEDURE — 80307 DRUG TEST PRSMV CHEM ANLYZR: CPT

## 2025-02-24 PROCEDURE — 80053 COMPREHEN METABOLIC PANEL: CPT

## 2025-02-24 PROCEDURE — 36415 COLL VENOUS BLD VENIPUNCTURE: CPT

## 2025-02-24 PROCEDURE — 90791 PSYCH DIAGNOSTIC EVALUATION: CPT | Performed by: SOCIAL WORKER

## 2025-02-24 PROCEDURE — 80179 DRUG ASSAY SALICYLATE: CPT

## 2025-02-24 PROCEDURE — 85025 COMPLETE CBC W/AUTO DIFF WBC: CPT

## 2025-02-25 VITALS
OXYGEN SATURATION: 99 % | TEMPERATURE: 98.7 F | SYSTOLIC BLOOD PRESSURE: 137 MMHG | DIASTOLIC BLOOD PRESSURE: 76 MMHG | RESPIRATION RATE: 18 BRPM | HEART RATE: 55 BPM

## 2025-02-25 NOTE — CARE COORDINATION
CM review of pt chart for possible discharge needs pt here today for MH evaluation.  It is known that pt is homeless and has been kicked out of homeless shelter and the [facility] ctr.    Pt has a MH  at [facility] [phone]. John has attempted to assist pt to secure housing but has not been successful with any housing.  Pt going back and forth to [facility] and this ER.    Pt never states where he is staying, assumption is that he is outside most of the time since he was evicted from apt in Oct. 24.    Today pt called 911 was [facility]. Today pt displaying MH behavior. MH eval to be completed.  POC pending at present. RICARDO,RN/CM

## 2025-02-25 NOTE — ED PROVIDER NOTES
EMERGENCY DEPARTMENT ENCOUNTER        Pt Name: Jhonatan Cameron  MRN: [id number]  Birthdate 1955  Date of evaluation: 2/24/2025  Provider: Reina Varma PA-C  PCP: Kaleb Ziegler APRN - CNP    MASSIEL. I have evaluated this patient.        Triage CHIEF COMPLAINT       Chief Complaint   Patient presents with    Psychiatric Evaluation         HISTORY OF PRESENT ILLNESS      Chief Complaint: \"I need to go to [facility]\"    Jhonatan Cameron is a 69 y.o. male who presents because he says he needs to get back to [facility] before midnight tonight.  He says that he is [facility] son, \"Harjit [facility]\" and exists in a time-space continuum.  He needs to get to [facility] because they are having a Superbowl Party tonight because the [employer] are playing--the Superbowl we saw on TV a few weeks ago \"was a farce.\"  He denies any SI or HI.       Nursing Notes were all reviewed and agreed with or any disagreements were addressed in the HPI.    REVIEW OF SYSTEMS     CONSTITUTIONAL:  Denies fever.  EYES:  Denies visual changes.  HEAD:  Denies headache.  ENT:  Denies earache, nasal congestion, sore throat.  NECK:  Denies neck pain.  RESPIRATORY:  Denies any shortness of breath.  CARDIOVASCULAR:  Denies chest pain.  GI:  Denies nausea or vomiting.    :  Denies urinary symptoms.  MUSCULOSKELETAL:  Denies extremity pain or swelling.  BACK:  Denies back pain.  INTEGUMENT:  Denies skin changes.  LYMPHATIC:  Denies lymphadenopathy.  NEUROLOGIC:  Denies any numbness/tingling.  PSYCHIATRIC:  Denies SI/HI.      PAST MEDICAL HISTORY     Past Medical History:   Diagnosis Date    Arthritis     Bipolar 1 disorder (HCC)     COPD (chronic obstructive pulmonary disease) (HCC)     \"use to have copd but is better\"    Hx of blood clots     \"had blood clot in lung and in my legs- that was in 2016- on Eliquis\"    Hyperlipidemia     Hypertension     Neuropathy     Schizo affective schizophrenia (HCC)     \"follow with Dr GERHARD Ramirez at [facility]

## 2025-02-25 NOTE — ED TRIAGE NOTES
Pt to the ED from the homeless shelter in need of a psychiatric evaluation. 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 Pt also states that he oversees [facility], and that his new job starts immediately. Pt denies SI/HI.

## 2025-02-25 NOTE — ED NOTES
Transfer Center Handoff for Behavioral Health Transfers      Patient's Current Location: [facility] EMERGENCY DEPARTMENT     Chief Complaint   Patient presents with    Psychiatric Evaluation       Current or History of Violent Behavior: Yes    Currently in Restraints Now or During this Encounter: No  (Specify if Agitation or self harm is noted in ED?)  If yes, please describe behaviors requiring restraint:             Medical Clearance Documented and Verified in the Chart: Yes    Allergies   Allergen Reactions    Thorazine [Chlorpromazine] Other (See Comments)     \"makes me do strange things\"        Can Patient Tolerate Lying Flat: Yes    Able to Perform ADLs:  Yes  (Specify if able to ambulate or uses any mobility devices such as cane or walker)  Activity:    Level of Assistance:    Assistive Device:    Miscellaneous Devices:      LABS    CBC:   Lab Results   Component Value Date/Time    WBC 6.1 02/24/2025 07:43 PM    RBC 4.06 02/24/2025 07:43 PM    HGB 11.9 02/24/2025 07:43 PM    HCT 37.8 02/24/2025 07:43 PM    MCV 93.1 02/24/2025 07:43 PM    MCH 29.3 02/24/2025 07:43 PM    MCHC 31.5 02/24/2025 07:43 PM    RDW 14.8 02/24/2025 07:43 PM     01/27/2025 07:10 PM    MPV 10.0 02/24/2025 07:43 PM     CMP:   Lab Results   Component Value Date/Time     02/24/2025 07:43 PM    K 4.0 02/24/2025 07:43 PM     02/24/2025 07:43 PM    CO2 27 02/24/2025 07:43 PM    BUN 22 02/24/2025 07:43 PM    CREATININE 0.8 02/24/2025 07:43 PM    GFRAA >60 06/17/2022 11:01 AM    LABGLOM 90 02/24/2025 07:43 PM    LABGLOM >60 11/30/2023 11:20 AM    GLUCOSE 83 02/24/2025 07:43 PM    CALCIUM 8.4 02/24/2025 07:43 PM    BILITOT 0.3 02/24/2025 07:43 PM    ALKPHOS 79 02/24/2025 07:43 PM    AST 21 02/24/2025 07:43 PM    ALT 16 02/24/2025 07:43 PM     Drug Panel:   Lab Results   Component Value Date/Time    OPIAU NEGATIVE 02/24/2025 08:57 PM     UA:  Lab Results   Component Value Date/Time    COLORU Yellow 01/27/2025

## 2025-02-25 NOTE — ED NOTES
Report called to [facility]. All questions answered at this time.     [facility] at bedside for transport. Belongings to cot. Pt transferred with no issues.

## 2025-02-25 NOTE — VIRTUAL HEALTH
Jhonatan Cameron  [id number]  1955     Social Work Behavioral Health Crisis Assessment    02/24/25    Chief Complaint: Mental Health Evaluation    HPI: Patient is a 69 y.o. White (non-) male who presents for mental health evaluation. Per EMR \"Jhonatan Cameron is a 69 y.o. male who presents because he says he needs to get back to [facility] before midnight tonight.  He says that he is Sarbjit Paige's son, \"Harjit Paige\" and exists in a time-space continuum.  He needs to get to [facility] because they are having a Superbowl Party tonight because the [place] are playing--the Superbowl we saw on TV a few weeks ago \"was a farce.\"  He denies any SI or HI.\"    Past Psychiatric History:  Previous Diagnoses/symptoms: Schizoaffective Disorder, Schizophrenia, Bipolar 1, Bev, Anxiety  Previous suicide attempts/self-harm: Denies  Inpatient psychiatric hospitalizations: yes; most recent 1/22/25  Current outpatient psychiatric provider: Denies  Current therapist: Denies  Previous psychiatric medication trials: Thorazine   Current psychiatric medications: Depakote, Lithium, Seroquel, Risperdal   Family Psychiatric History: mother's side     Sleep Hours: \"I rest when the speed of light\"     Sleep concerns: denies     Use of sleep medications: denies     Substance Abuse History:  Tobacco: Denies  Alcohol: Denies  Marijuana: Denies  Stimulant: Denies  Opiates: Denies  Benzodiazepine: Denies  Other illicit drug usage: Denies  History of substance/alcohol abuse treatment: Denies     Social History:  Education: College degree  Living Situation/Interest: homeless; per chart cannot return to [facility]  Marital/Committed relationship and parenting hx: single  Occupation: Unemployed  Legal History/Hx of Violence: Denies  Spiritual History: Denies  Psychological trauma, neglect, or abuse: denies hx of trauma/abuse   Access to guns or other weapons: denies having access to firearms/dangerous weapons        Past Medical  Please see my previous note

## 2025-03-08 ENCOUNTER — HOSPITAL ENCOUNTER (EMERGENCY)
Age: 70
Discharge: PSYCHIATRIC OR SUBSTANCE USE FACILITY | End: 2025-03-09
Attending: EMERGENCY MEDICINE
Payer: MEDICARE

## 2025-03-08 ENCOUNTER — HOSPITAL ENCOUNTER (EMERGENCY)
Age: 70
Discharge: HOME | End: 2025-03-08
Attending: EMERGENCY MEDICINE
Payer: MEDICARE

## 2025-03-08 VITALS
DIASTOLIC BLOOD PRESSURE: 93 MMHG | RESPIRATION RATE: 16 BRPM | OXYGEN SATURATION: 98 % | HEART RATE: 69 BPM | TEMPERATURE: 97.8 F | SYSTOLIC BLOOD PRESSURE: 171 MMHG

## 2025-03-08 DIAGNOSIS — R60.0 PERIPHERAL EDEMA: Primary | ICD-10-CM

## 2025-03-08 DIAGNOSIS — F39 MOOD DISORDER: Primary | ICD-10-CM

## 2025-03-08 LAB
ALBUMIN SERPL-MCNC: 3.7 G/DL (ref 3.4–5)
ALBUMIN SERPL-MCNC: 3.9 G/DL (ref 3.4–5)
ALBUMIN/GLOB SERPL: 1.2 {RATIO} (ref 1.1–2.2)
ALBUMIN/GLOB SERPL: 1.3 {RATIO} (ref 1.1–2.2)
ALP SERPL-CCNC: 101 U/L (ref 40–129)
ALP SERPL-CCNC: 98 U/L (ref 40–129)
ALT SERPL-CCNC: 16 U/L (ref 10–40)
ALT SERPL-CCNC: 16 U/L (ref 10–40)
AMPHET UR QL SCN: NEGATIVE
ANION GAP SERPL CALCULATED.3IONS-SCNC: 10 MMOL/L (ref 9–17)
ANION GAP SERPL CALCULATED.3IONS-SCNC: 11 MMOL/L (ref 9–17)
APAP SERPL-MCNC: <5 UG/ML (ref 10–30)
AST SERPL-CCNC: 23 U/L (ref 15–37)
AST SERPL-CCNC: 24 U/L (ref 15–37)
BARBITURATES UR QL SCN: NEGATIVE
BASOPHILS # BLD: 0.04 K/UL
BASOPHILS # BLD: 0.05 K/UL
BASOPHILS NFR BLD: 1 % (ref 0–1)
BASOPHILS NFR BLD: 1 % (ref 0–1)
BENZODIAZ UR QL: NEGATIVE
BILIRUB SERPL-MCNC: 0.4 MG/DL (ref 0–1)
BILIRUB SERPL-MCNC: 0.5 MG/DL (ref 0–1)
BILIRUB UR QL STRIP: NEGATIVE
BNP SERPL-MCNC: <36 PG/ML (ref 0–125)
BUN SERPL-MCNC: 19 MG/DL (ref 7–20)
BUN SERPL-MCNC: 22 MG/DL (ref 7–20)
CALCIUM SERPL-MCNC: 10 MG/DL (ref 8.3–10.6)
CALCIUM SERPL-MCNC: 9.6 MG/DL (ref 8.3–10.6)
CANNABINOIDS UR QL SCN: NEGATIVE
CASTS #/AREA URNS LPF: ABNORMAL /LPF
CHLORIDE SERPL-SCNC: 106 MMOL/L (ref 99–110)
CHLORIDE SERPL-SCNC: 106 MMOL/L (ref 99–110)
CLARITY UR: CLEAR
CO2 SERPL-SCNC: 24 MMOL/L (ref 21–32)
CO2 SERPL-SCNC: 26 MMOL/L (ref 21–32)
COCAINE UR QL SCN: NEGATIVE
COLOR UR: YELLOW
CREAT SERPL-MCNC: 0.9 MG/DL (ref 0.8–1.3)
CREAT SERPL-MCNC: 1 MG/DL (ref 0.8–1.3)
EOSINOPHIL # BLD: 0.21 K/UL
EOSINOPHIL # BLD: 0.31 K/UL
EOSINOPHILS RELATIVE PERCENT: 3 % (ref 0–3)
EOSINOPHILS RELATIVE PERCENT: 4 % (ref 0–3)
ERYTHROCYTE [DISTWIDTH] IN BLOOD BY AUTOMATED COUNT: 14.6 % (ref 11.7–14.9)
ERYTHROCYTE [DISTWIDTH] IN BLOOD BY AUTOMATED COUNT: 14.7 % (ref 11.7–14.9)
ETHANOLAMINE SERPL-MCNC: <10 MG/DL (ref 0–0.08)
FENTANYL UR QL: NEGATIVE
GFR, ESTIMATED: 78 ML/MIN/1.73M2
GFR, ESTIMATED: 84 ML/MIN/1.73M2
GLUCOSE SERPL-MCNC: 135 MG/DL (ref 74–99)
GLUCOSE SERPL-MCNC: 99 MG/DL (ref 74–99)
GLUCOSE UR STRIP-MCNC: NEGATIVE MG/DL
HCT VFR BLD AUTO: 42.6 % (ref 42–52)
HCT VFR BLD AUTO: 44.6 % (ref 42–52)
HGB BLD-MCNC: 13.7 G/DL (ref 13.5–18)
HGB BLD-MCNC: 14.2 G/DL (ref 13.5–18)
HGB UR QL STRIP.AUTO: ABNORMAL
IMM GRANULOCYTES # BLD AUTO: 0.02 K/UL
IMM GRANULOCYTES # BLD AUTO: 0.03 K/UL
IMM GRANULOCYTES NFR BLD: 0 %
IMM GRANULOCYTES NFR BLD: 0 %
KETONES UR STRIP-MCNC: NEGATIVE MG/DL
LEUKOCYTE ESTERASE UR QL STRIP: NEGATIVE
LYMPHOCYTES NFR BLD: 1.02 K/UL
LYMPHOCYTES NFR BLD: 1.15 K/UL
LYMPHOCYTES RELATIVE PERCENT: 14 % (ref 24–44)
LYMPHOCYTES RELATIVE PERCENT: 14 % (ref 24–44)
MCH RBC QN AUTO: 29.2 PG (ref 27–31)
MCH RBC QN AUTO: 29.5 PG (ref 27–31)
MCHC RBC AUTO-ENTMCNC: 31.8 G/DL (ref 32–36)
MCHC RBC AUTO-ENTMCNC: 32.2 G/DL (ref 32–36)
MCV RBC AUTO: 91.6 FL (ref 78–100)
MCV RBC AUTO: 91.8 FL (ref 78–100)
MONOCYTES NFR BLD: 0.43 K/UL
MONOCYTES NFR BLD: 0.66 K/UL
MONOCYTES NFR BLD: 6 % (ref 0–4)
MONOCYTES NFR BLD: 8 % (ref 0–4)
MUCOUS THREADS URNS QL MICRO: ABNORMAL
NEUTROPHILS NFR BLD: 73 % (ref 36–66)
NEUTROPHILS NFR BLD: 77 % (ref 36–66)
NEUTS SEG NFR BLD: 5.61 K/UL
NEUTS SEG NFR BLD: 5.83 K/UL
NITRITE UR QL STRIP: NEGATIVE
OPIATES UR QL SCN: NEGATIVE
OXYCODONE UR QL SCN: NEGATIVE
PH UR STRIP: 5.5 [PH] (ref 5–8)
PLATELET # BLD AUTO: 161 K/UL (ref 140–440)
PLATELET # BLD AUTO: 169 K/UL (ref 140–440)
PMV BLD AUTO: 9.5 FL (ref 7.5–11.1)
PMV BLD AUTO: 9.7 FL (ref 7.5–11.1)
POTASSIUM SERPL-SCNC: 3.9 MMOL/L (ref 3.5–5.1)
POTASSIUM SERPL-SCNC: 4 MMOL/L (ref 3.5–5.1)
PROT SERPL-MCNC: 6.6 G/DL (ref 6.4–8.2)
PROT SERPL-MCNC: 7 G/DL (ref 6.4–8.2)
PROT UR STRIP-MCNC: NEGATIVE MG/DL
RBC # BLD AUTO: 4.64 M/UL (ref 4.6–6.2)
RBC # BLD AUTO: 4.87 M/UL (ref 4.6–6.2)
RBC #/AREA URNS HPF: <1 /HPF (ref 0–2)
SALICYLATES SERPL-MCNC: <0.5 MG/DL (ref 15–30)
SODIUM SERPL-SCNC: 140 MMOL/L (ref 136–145)
SODIUM SERPL-SCNC: 142 MMOL/L (ref 136–145)
SP GR UR STRIP: 1.01 (ref 1–1.03)
TEST INFORMATION: NORMAL
UROBILINOGEN UR STRIP-ACNC: 0.2 EU/DL (ref 0–1)
WBC #/AREA URNS HPF: <1 /HPF (ref 0–5)
WBC OTHER # BLD: 7.3 K/UL (ref 4–10.5)
WBC OTHER # BLD: 8 K/UL (ref 4–10.5)

## 2025-03-08 PROCEDURE — 99285 EMERGENCY DEPT VISIT HI MDM: CPT

## 2025-03-08 PROCEDURE — 81001 URINALYSIS AUTO W/SCOPE: CPT

## 2025-03-08 PROCEDURE — 85025 COMPLETE CBC W/AUTO DIFF WBC: CPT

## 2025-03-08 PROCEDURE — 6360000002 HC RX W HCPCS: Performed by: EMERGENCY MEDICINE

## 2025-03-08 PROCEDURE — 80053 COMPREHEN METABOLIC PANEL: CPT

## 2025-03-08 PROCEDURE — 80307 DRUG TEST PRSMV CHEM ANLYZR: CPT

## 2025-03-08 PROCEDURE — 96374 THER/PROPH/DIAG INJ IV PUSH: CPT

## 2025-03-08 PROCEDURE — 80179 DRUG ASSAY SALICYLATE: CPT

## 2025-03-08 PROCEDURE — 80143 DRUG ASSAY ACETAMINOPHEN: CPT

## 2025-03-08 PROCEDURE — 83880 ASSAY OF NATRIURETIC PEPTIDE: CPT

## 2025-03-08 PROCEDURE — 99284 EMERGENCY DEPT VISIT MOD MDM: CPT

## 2025-03-08 PROCEDURE — G0480 DRUG TEST DEF 1-7 CLASSES: HCPCS

## 2025-03-08 RX ORDER — FUROSEMIDE 10 MG/ML
40 INJECTION INTRAMUSCULAR; INTRAVENOUS ONCE
Status: COMPLETED | OUTPATIENT
Start: 2025-03-08 | End: 2025-03-08

## 2025-03-08 RX ADMIN — FUROSEMIDE 40 MG: 10 INJECTION, SOLUTION INTRAMUSCULAR; INTRAVENOUS at 19:22

## 2025-03-08 ASSESSMENT — PAIN SCALES - GENERAL: PAINLEVEL_OUTOF10: 0

## 2025-03-08 NOTE — ED PROVIDER NOTES
EMERGENCY DEPARTMENT ENCOUNTER      CHIEF COMPLAINT:   Bilateral leg swelling    HPI: Jhonatan Cameron is a 69 y.o. male who presents to the Emergency Department complaining of bilateral leg swelling.  The patient states he has a history of peripheral edema.  He states that his legs are often swollen but that he has not had his Lasix in several days.  He states that he is homeless and has been staying at the shelter.  He states that he ran out of his Lasix and has not been able to get a refill.  He has had increased swelling of both lower legs.  He states they feel tight in nature.  He denies any pain. There are no exacerbating or relieving factors. The patient denies a history of DVT or PE. The patient denies fevers, chills, chest pain, shortness of breath, abdominal pain, numbness, tingling, weakness, or any other complaints.    REVIEW OF SYSTEMS:  CONSTITUTIONAL:  Denies fever, chills, weight loss or weakness  EYES:  Denies photophobia or discharge  ENT:  Denies sore throat or ear pain  CARDIOVASCULAR:  Denies chest pain, palpitations or swelling  RESPIRATORY:  Denies cough or shortness of breath  GI: Denies abdominal pain, nausea, vomiting, or diarrhea  MUSCULOSKELETAL:  Denies back pain  SKIN:  No rash  NEUROLOGIC:  Denies headache, focal weakness or sensory changes  All systems negative except as marked.  \"Remaining review of systems reviewed and negative. I have reviewed the nursing triage documentation and agree unless otherwise noted below.\"    PAST MEDICAL HISTORY:   Past Medical History:   Diagnosis Date    Arthritis     Bipolar 1 disorder (HCC)     COPD (chronic obstructive pulmonary disease) (HCC)     \"use to have copd but is better\"    Hx of blood clots     \"had blood clot in lung and in my legs- that was in 2016- on Eliquis\"    Hyperlipidemia     Hypertension     Neuropathy     Schizo affective schizophrenia (HCC)     \"follow with Dr GERHARD Ramirez at [facility]    Tremors of nervous system     WD-Arterial

## 2025-03-08 NOTE — CARE COORDINATION
CM consult per Dr Vegas for discharge planning for this pt here for leg edema, pt is seen often for the same.   CM visited pt who is alert and oriented. Pt confirms he is being housed at the [facility] shelter. Pt states he will come to the hospital o/p pharmacy on Monday to get his Rx for lasix filled on Monday, states it is close to the Shelter and he can walk.     Update to Dr Vegas, POC is for discharge home. RICARDO,RN/CM

## 2025-03-09 VITALS
HEART RATE: 89 BPM | RESPIRATION RATE: 18 BRPM | TEMPERATURE: 97.7 F | DIASTOLIC BLOOD PRESSURE: 97 MMHG | SYSTOLIC BLOOD PRESSURE: 111 MMHG | OXYGEN SATURATION: 97 %

## 2025-03-09 PROCEDURE — 90791 PSYCH DIAGNOSTIC EVALUATION: CPT | Performed by: COUNSELOR

## 2025-03-09 ASSESSMENT — PAIN SCALES - GENERAL: PAINLEVEL_OUTOF10: 0

## 2025-03-09 ASSESSMENT — PAIN - FUNCTIONAL ASSESSMENT: PAIN_FUNCTIONAL_ASSESSMENT: 0-10

## 2025-03-09 NOTE — VIRTUAL HEALTH
Jhonatan Cameron  [id number]  1955     Social Work Behavioral Health Crisis Assessment    03/09/25    Chief Complaint: Per chart, \"Pt with mood disorder decompensation. H/o schizophrenia and psychosis. Pt is medically clear. Pt is voluntary, no pink slip.\"    HPI: Patient is a 69 y.o. White (non-) male who presents for psych eval. Patient presented to the ED on 03/09/25 from homeless. Per chart, \"presents with mood disorder exacerbation. He was seen earlier today by department for evaluation of some peripheral edema. Does have a longstanding history of mood disorder. He has been prescribed in previous notes having some Zofran ER as well as episodes of psychosis. He comes emergency department making some bizarre statements. He states he is \"trapped in time.\" He states he has a house but he tried to go there and it was not there. He states he is post to be at [facility] in 1 day. He was discharged earlier today from the emergency department and sent to a homeless shelter but unfortunately he got there after a closed. He again called squad to come to the emergency department. He does endorse that he has nowhere to go. He denies any overt suicidality. Denies any plan toward self-harm. Denies any homicidal ideation. Denies any auditory visual hallucinations.\"    Past Psychiatric History:  Previous Diagnoses/symptoms: bipolar, schizoaffective, schizophrenia, shy  Previous suicide attempts/self-harm: Denies  Inpatient psychiatric hospitalizations: yes  Current outpatient psychiatric provider: yes  Current therapist: yes  Previous psychiatric medication trials: thorazine  Current psychiatric medications: depakote, lithium, seroquel, risperdal  Family Psychiatric History: yes mom's side     Sleep Hours: states very tired    Sleep concerns: difficulty attaining sleep    Use of sleep medications: denies    Substance Abuse History:  Tobacco: Denies  Alcohol: Denies  Marijuana: Denies  Stimulant:

## 2025-03-09 NOTE — CARE COORDINATION
Pt has returned to the ER this evening after he told me earlier today with prior visit that he was staying at the [facility] and could go back at any time when discharged from the hospital.     Pt now here stating he could not get in to the [facility], that he was past curfew so he returned to ER stating he is homeless, pt has been homeless for a while.     CM contacted [facility] # [phone] to see if pt was staying there at the shelter. CM was informed that pt was staying there but has been gone for the past two days. Pt showed up to shelter this afternoon but since he disappeared for past two days, he will have to meet with the supervisor,Sonu Solo or with Lia on Monday to complete paperwork and discuss getting back into the shelter.    CM visited pt to discuss him disappearing from the shelter then showing back up two days later, is breaking the shelter rules, as there are people waiting to get into the shelter.  CM reminded pt of the importance of meeting with Sonu or Lia on Monday with the possibility of being allowed to return to the shelter.    Pt has a MH  through R, John. This CM has talked with John in the past when he has been here with the pt. John has attempted to find housing for this pt but has been unsuccessful.    Pt has been in [facility] and has been kicked out of places due to his behavior, this has made it impossible for John to help him get any housing.     Pt has been banned from [facility].    Pt has been managing on the street since some time last year.  Pt is seen in the ER often and once medically cleared and discharge pt exits the ER. Pt had no needs from CM. RICARDO,RN/CM

## 2025-03-09 NOTE — ED NOTES
Bed Number: assigned on arrival   Level of Care:    Report Number: [phone]   Accepting MD (Full name): Dr. Porfirio Castorena   Accepting Facility: [facility]

## 2025-03-09 NOTE — CARE COORDINATION
CM - Room # 24 - Initially spoke with pt about his return to ER -pt claimed he was not allowed to enter the [facility] --he was just released from Er earlier today. Pt is well known to ER staff. Pt is here tonight for SI after being unable to gain entry into [facility]. See Sonia BAKER RN/CAP[facility] notes .

## 2025-03-09 NOTE — ED PROVIDER NOTES
0651 Per staff, accepted @ [facility] by Dr Castorena [CR]      ED Course User Index  [CR] Ricky Jeffries MD       Further MDM and disposition:   Assessment:   SI/homelessness  Plan:   Medically cleared and seen by telepsych prior to my involvement in the case.  Recommended inpatient admission.  Voluntary inpatient admission.  Pending placement.  Accepted @ [facility].  To be transferred      PROCEDURES: (None if blank)  Procedures:     CRITICAL CARE: (None if blank)        Independent Imaging Interpretation by me: please seen ED course/above/below  EKG (if obtained): (All EKG's are interpreted by myself in the absence of a cardiologist) Please see ED course/above/below    ED COURSE:  ED Course as of 03/09/25 0652   Sun Mar 09, 2025   0651 Per staff, accepted @ [facility] by Dr Castorena [CR]      ED Course User Index  [CR] Ricky Jeffries MD       Final diagnoses:   Mood disorder     New Prescriptions    No medications on file         Final diagnoses:   Mood disorder     Condition: stable  Dispo: Transfer    This transcription was electronically signed. It was dictated by use of voice recognition software and electronically transcribed. The transcription may contain errors not detected in proofreading.     Electronically Signed: Ricky Jeffries MD, 03/09/25, 6:52 AM       Ricky Jeffries MD  03/09/25 0652    
patient.      CONSULTS:  None    PROCEDURES:  None performed unless otherwise noted below     Procedures        FINAL IMPRESSION      1. Mood disorder          DISPOSITION/PLAN   DISPOSITION        PATIENT REFERRED TO:  No follow-up provider specified.    DISCHARGE MEDICATIONS:  New Prescriptions    No medications on file       ED Provider Disposition Time  DISPOSITION                 Appropriate personal protective equipment was worn during the patient's evaluation.  These included surgical, eye protection, surgical mask or in 95 respirator and gloves.  The patient was also placed in a surgical mask for the prevention of possible spread of respiratory viral illnesses.    The Patient was instructed to read the package inserts with any medication that was prescribed.  Major potential reactions and medication interactions were discussed.  The Patient understands that there are numerous possible adverse reactions not covered.    The patient was also instructed to arrange follow-up with his or her primary care provider for review of any pending labwork or incidental findings on any radiology results that were obtained.  All efforts were made to discuss any incidental findings that require further monitoring.      Controlled Substances Monitoring:          No data to display                (Please note that portions of this note were completed with a voice recognition program.  Efforts were made to edit the dictations but occasionally words are mis-transcribed.)    Albina Mcnair MD (electronically signed)  Attending Emergency Physician           Albina Celaya MD  03/09/25 0132

## 2025-03-09 NOTE — ED TRIAGE NOTES
Pt to the ED via EMS with c/o feeling suicidal after not making it back in time to get into the shelter. Pt was just discharged from here

## 2025-03-09 NOTE — ED NOTES
Transfer Center Handoff for Behavioral Health Transfers      Patient's Current Location: [facility] EMERGENCY DEPARTMENT     Chief Complaint   Patient presents with    Homeless     D/c'd, didn't get to the shelter in time, now claiming he is suicidal again because he doesn't have anywhere to sleep.        Current or History of Violent Behavior: Yes    Currently in Restraints Now or During this Encounter: No  (Specify if Agitation or self harm is noted in ED?)  If yes, please describe behaviors requiring restraint:             Medical Clearance Documented and Verified in the Chart: No    Allergies   Allergen Reactions    Thorazine [Chlorpromazine] Other (See Comments)     \"makes me do strange things\"        Can Patient Tolerate Lying Flat: Yes    Able to Perform ADLs:  Yes  (Specify if able to ambulate or uses any mobility devices such as cane or walker)  Activity:    Level of Assistance:    Assistive Device:    Miscellaneous Devices:      LABS    CBC:   Lab Results   Component Value Date/Time    WBC 8.0 03/08/2025 09:55 PM    RBC 4.87 03/08/2025 09:55 PM    HGB 14.2 03/08/2025 09:55 PM    HCT 44.6 03/08/2025 09:55 PM    MCV 91.6 03/08/2025 09:55 PM    MCH 29.2 03/08/2025 09:55 PM    MCHC 31.8 03/08/2025 09:55 PM    RDW 14.7 03/08/2025 09:55 PM     03/08/2025 09:55 PM    MPV 9.7 03/08/2025 09:55 PM     CMP:   Lab Results   Component Value Date/Time     03/08/2025 09:55 PM    K 3.9 03/08/2025 09:55 PM     03/08/2025 09:55 PM    CO2 24 03/08/2025 09:55 PM    BUN 22 03/08/2025 09:55 PM    CREATININE 0.9 03/08/2025 09:55 PM    GFRAA >60 06/17/2022 11:01 AM    LABGLOM 84 03/08/2025 09:55 PM    LABGLOM >60 11/30/2023 11:20 AM    GLUCOSE 99 03/08/2025 09:55 PM    CALCIUM 10.0 03/08/2025 09:55 PM    BILITOT 0.4 03/08/2025 09:55 PM    ALKPHOS 101 03/08/2025 09:55 PM    AST 23 03/08/2025 09:55 PM    ALT 16 03/08/2025 09:55 PM     Drug Panel:   Lab Results   Component Value Date/Time    OPIAU

## 2025-03-12 NOTE — MANAGEMENT PLAN
Patient Management Plan          Patient: Jhonatan Cameron MRN: [id number]   : 1955   Management Plan entered by: Moon FERRERAW, Rosa M Laguna MSW and Jennifer Barton [facility]   Date Plan entered: 2025.   CAPRI YAN/Jluis # [phone].   Emergency Contact: sister Pederson [phone]   Patients Physicians:    Possible [facility] psych provider: Kaleb Ziegler CNP [phone]    [facility] Care, Providers: BRENDA Hancock and Chloé CURRAN  [phone]   Emergency Contact:    Emily Cameron,   [phone]      Warnings and Alerts:   Notify Police/Security that patient is in the Ed and to do hourly rounds   Medication management: For acute agitation would recommend Haldol 5 mg IM and Ativan 2 mg IM and Benadryl 50 mg IM q6 hours prn              Summary      Reason for Referral: This patient has been provided a management plan for Psychiatric Needs    The patient frequently presents to the ED, insisting he has a personal room on a non-existent mental health floor. When denied admission, he becomes belligerent, verbally aggressive, and disruptive, making threats against staff and suicidal/homicidal statements. He only seeks mental health services after being removed from shelters and has been permanently banned from all [facility], including [facility], due to dangerous behavior.                    Warnings/Safety Alerts:    Pattern of Manipulation: The patient has a history of manipulating situations to serve personal interests, frequently utilizing emergency departments and hospitals for temporary housing.   Disruptive and Aggressive Behavior: The patient is known to engage in argumentative and disruptive interactions with staff, often accusing them of dishonesty while insisting on being correct. He has exhibited aggressive behavior toward staff, which may pose challenges to care and safety.   Safety Concern: The patient has been identified as a potential danger

## 2025-03-24 PROCEDURE — 99283 EMERGENCY DEPT VISIT LOW MDM: CPT

## 2025-03-25 ENCOUNTER — HOSPITAL ENCOUNTER (EMERGENCY)
Age: 70
Discharge: HOME | End: 2025-03-25
Attending: EMERGENCY MEDICINE
Payer: MEDICARE

## 2025-03-25 VITALS
TEMPERATURE: 97.5 F | OXYGEN SATURATION: 96 % | RESPIRATION RATE: 18 BRPM | DIASTOLIC BLOOD PRESSURE: 82 MMHG | SYSTOLIC BLOOD PRESSURE: 135 MMHG | HEART RATE: 87 BPM

## 2025-03-25 VITALS
DIASTOLIC BLOOD PRESSURE: 88 MMHG | WEIGHT: 240 LBS | SYSTOLIC BLOOD PRESSURE: 137 MMHG | RESPIRATION RATE: 18 BRPM | HEART RATE: 81 BPM | TEMPERATURE: 98.6 F | HEIGHT: 71 IN | OXYGEN SATURATION: 96 % | BODY MASS INDEX: 33.6 KG/M2

## 2025-03-25 DIAGNOSIS — Z76.0 ENCOUNTER FOR MEDICATION REFILL: Primary | ICD-10-CM

## 2025-03-25 DIAGNOSIS — M79.89 LEG SWELLING: Primary | ICD-10-CM

## 2025-03-25 PROCEDURE — 6370000000 HC RX 637 (ALT 250 FOR IP): Performed by: EMERGENCY MEDICINE

## 2025-03-25 PROCEDURE — 99283 EMERGENCY DEPT VISIT LOW MDM: CPT

## 2025-03-25 RX ORDER — FUROSEMIDE 20 MG/1
40 TABLET ORAL ONCE
Status: COMPLETED | OUTPATIENT
Start: 2025-03-25 | End: 2025-03-25

## 2025-03-25 RX ADMIN — FUROSEMIDE 40 MG: 20 TABLET ORAL at 01:00

## 2025-03-25 RX ADMIN — APIXABAN 5 MG: 5 TABLET, FILM COATED ORAL at 01:00

## 2025-03-25 RX ADMIN — FUROSEMIDE 40 MG: 20 TABLET ORAL at 22:51

## 2025-03-25 RX ADMIN — APIXABAN 5 MG: 5 TABLET, FILM COATED ORAL at 22:51

## 2025-03-25 ASSESSMENT — PAIN - FUNCTIONAL ASSESSMENT: PAIN_FUNCTIONAL_ASSESSMENT: 0-10

## 2025-03-25 ASSESSMENT — PAIN SCALES - GENERAL
PAINLEVEL_OUTOF10: 4
PAINLEVEL_OUTOF10: 4

## 2025-03-25 ASSESSMENT — PAIN DESCRIPTION - LOCATION: LOCATION: LEG

## 2025-03-25 ASSESSMENT — PAIN DESCRIPTION - ORIENTATION: ORIENTATION: RIGHT;LEFT

## 2025-03-25 ASSESSMENT — PAIN DESCRIPTION - DESCRIPTORS: DESCRIPTORS: ACHING

## 2025-03-25 NOTE — ED NOTES
PT became argumentative and started yelling that \"we don't care\" and that we should house the homeless.  Explained that isn't possible and we are here to treat patients medically.   PT continued to argue and yell about not being allowed to just stay here.   Explained to patient that he is discharged and I won't continue to argue with him.  Security called to the bedside.

## 2025-03-25 NOTE — ED PROVIDER NOTES
Triage Chief Complaint:   Leg Swelling (Pt states he has lymphoedema in legs. Pt states he has been off his medications for 2 days now. )    [place]:  Jhonatan Cameron is a 69 y.o. male that presents for medications.  States that he was unable to get into the homeless shelter [facility] where his medications are and has missed his dose of Lasix and Eliquis.  Denies any other acute complaints.  States that he is going to the [facility] care [place] tomorrow for additional medication and housing assistance.  No other acute complaints.    ROS:  At least 6 systems reviewed and otherwise acutely negative except as in the [place].    Past Medical History:   Diagnosis Date    Arthritis     Bipolar 1 disorder (HCC)     COPD (chronic obstructive pulmonary disease) ([facility])     \"use to have copd but is better\"    Hx of blood clots     \"had blood clot in lung and in my legs- that was in 2016- on Eliquis\"    Hyperlipidemia     Hypertension     Neuropathy     Schizo affective schizophrenia (HCC)     \"follow with Dr GERHARD Ramirez at [facility]    Tremors of nervous system     WD-Arterial insufficiency of lower extremity (HCC) 2/4/2021    WD-Cellulitis of left lower leg 2/4/2021    WD-Venous insufficiency of both lower extremities 2/4/2021    WDCellulitis of leg, right 2/4/2021     Past Surgical History:   Procedure Laterality Date    COLONOSCOPY  last one 2012    DENTAL SURGERY      \"wisdom teeth put to sleep- yrs ago\"    FRACTURE SURGERY  age 25    fx left fibia and tibia- hardware later removed    HYDROCELE EXCISION  10+ yrs ago    IR GUIDED IVC FILTER PLACEMENT N/A 07/14/2016    ALN filter lot#[id number], exp - Dr [facility]    JOINT REPLACEMENT  2012    L knee    UPPER GASTROINTESTINAL ENDOSCOPY  04/12/2018     Family History   Problem Relation Age of Onset    Cancer Mother         breast ca age 45, then later had someother form of cancer?unsure what it was    Heart Disease Father     Stroke Father     Cancer Sister     No Known Problems Brother

## 2025-03-26 NOTE — ED PROVIDER NOTES
Triage Chief Complaint:   Medication Refill    [place]:  Jhonatan Cameron is a 69 y.o. male that presents for home doses of Lasix and Eliquis.  States that the homeless shelter has all of his medications and they are not letting him into get them.  States that he can get refills tomorrow from the pharmacy.  States that he just needs his nighttime doses.  No other acute complaints.    ROS:  At least 4 systems reviewed and otherwise acutely negative except as in the [place].    Past Medical History:   Diagnosis Date    Arthritis     Bipolar 1 disorder (HCC)     COPD (chronic obstructive pulmonary disease) (HCC)     \"use to have copd but is better\"    Hx of blood clots     \"had blood clot in lung and in my legs- that was in 2016- on Eliquis\"    Hyperlipidemia     Hypertension     Neuropathy     Schizo affective schizophrenia (HCC)     \"follow with Dr GERHARD Ramirez at [facility]    Tremors of nervous system     WD-Arterial insufficiency of lower extremity (HCC) 2/4/2021    WD-Cellulitis of left lower leg 2/4/2021    WD-Venous insufficiency of both lower extremities 2/4/2021    WDCellulitis of leg, right 2/4/2021     Past Surgical History:   Procedure Laterality Date    COLONOSCOPY  last one 2012    DENTAL SURGERY      \"wisdom teeth put to sleep- yrs ago\"    FRACTURE SURGERY  age 25    fx left fibia and tibia- hardware later removed    HYDROCELE EXCISION  10+ yrs ago    IR GUIDED IVC FILTER PLACEMENT N/A 07/14/2016    ALN filter lot#[id number], exp - Dr Alex[facility]    JOINT REPLACEMENT  2012    L knee    UPPER GASTROINTESTINAL ENDOSCOPY  04/12/2018     Family History   Problem Relation Age of Onset    Cancer Mother         breast ca age 45, then later had someother form of cancer?unsure what it was    Heart Disease Father     Stroke Father     Cancer Sister     No Known Problems Brother      Social History     Socioeconomic History    Marital status: Single     Spouse name: Not on file    Number of children: Not on file    Years of  furosemide (LASIX) 40 MG tablet Take 1 tablet by mouth in the morning and 1 tablet in the evening. 60 tablet 1    potassium chloride (KLOR-CON) 10 MEQ extended release tablet Take 1 tablet by mouth daily 30 tablet 1    vitamin D 25 MCG (1000 UT) CAPS Take 1 capsule by mouth Daily 30 capsule 1    acetaminophen (TYLENOL) 500 MG tablet Take 2 tablets by mouth every 6 hours as needed for Pain 120 tablet 0    divalproex (DEPAKOTE) 500 MG DR tablet Take 4 tablets by mouth daily Take 4 tablets (2000 mg) po at bedtime 120 tablet 0    primidone (MYSOLINE) 50 MG tablet Take 2 tablets by mouth in the morning and 2 tablets in the evening. 120 tablet 0    lithium 300 MG tablet Take 2 tablets by mouth 2 times daily Take 2 capsules (600 mg) po bid 120 tablet 0    QUEtiapine (SEROQUEL) 25 MG tablet Take 2 tablets by mouth Daily Bed time 60 tablet 0    risperiDONE (RISPERDAL) 3 MG tablet Take 1 tablet by mouth nightly 30 tablet 0     Allergies   Allergen Reactions    Thorazine [Chlorpromazine] Other (See Comments)     \"makes me do strange things\"       Nursing Notes Reviewed    Physical Exam:  ED Triage Vitals [03/25/25 2227]   Encounter Vitals Group      /82      Systolic BP Percentile       Diastolic BP Percentile       Pulse 87      Respirations 18      Temp 97.5 °F (36.4 °C)      Temp src       SpO2 96 %      Weight       Height       Head Circumference       Peak Flow       Pain Score       Pain Loc       Pain Education       Exclude from Growth Chart      GENERAL APPEARANCE: Awake and alert. Cooperative. No acute distress.   EYES: EOM's grossly intact. Conjunctiva anicteric.  HEENT: NC/AT, Mucous membranes are moist. Tolerates saliva. No trismus.  HEART:  Extremities pink  LUNGS: Respirations unlabored. Even chest rise bilaterally  ABDOMEN: Non distended.   EXTREMITIES: No acute deformities.  SKIN: Dry  NEUROLOGICAL: No gross facial drooping. Moves all 4 extremities spontaneously.  PSYCHIATRIC: Normal mood.    I have

## 2025-03-26 NOTE — ED TRIAGE NOTES
Patient to ED for medication refills. Patient states he was kicked out of the homeless shelter and they will not let him go back in to get his medications.

## 2025-03-28 ENCOUNTER — HOSPITAL ENCOUNTER (EMERGENCY)
Age: 70
Discharge: HOME | End: 2025-03-28
Payer: MEDICARE

## 2025-03-28 VITALS
OXYGEN SATURATION: 95 % | DIASTOLIC BLOOD PRESSURE: 86 MMHG | TEMPERATURE: 97.7 F | RESPIRATION RATE: 18 BRPM | SYSTOLIC BLOOD PRESSURE: 131 MMHG | HEART RATE: 78 BPM

## 2025-03-28 DIAGNOSIS — R60.0 PERIPHERAL EDEMA: Primary | ICD-10-CM

## 2025-03-28 PROCEDURE — 6370000000 HC RX 637 (ALT 250 FOR IP): Performed by: PHYSICIAN ASSISTANT

## 2025-03-28 PROCEDURE — 99283 EMERGENCY DEPT VISIT LOW MDM: CPT

## 2025-03-28 RX ORDER — FUROSEMIDE 20 MG/1
40 TABLET ORAL ONCE
Status: COMPLETED | OUTPATIENT
Start: 2025-03-28 | End: 2025-03-28

## 2025-03-28 RX ADMIN — FUROSEMIDE 40 MG: 20 TABLET ORAL at 17:48

## 2025-03-28 NOTE — DISCHARGE INSTRUCTIONS
Resources for Emergency Housing Assistance      [employer]hel[employer]    [facility] (emergency housing for families & single women)  [address] [place] [address]  Phone: [phone]  Fax: [phone]    [facility] (emergency housing for single men)  [address] [place] [address]   ?Phone: [phone] ?  Fax: [phone]    [facility] (permanent supportive housing)  [address] [place] [address] ?  Phone: [phone] ?  Fax: [phone]      HOW TO GET HELP:    Step 1:  Grab your Photo ID.?We need to be sure who you are.    Step 2:  To speed up the process, get a police check downtown.    Step 3:  Call [facility]s Intake Office Hours?M-F, 9:00 am to 3:30 pm,?[phone].        [facility]    Walk-in Assistance  Location: [address] [place] [address]  Hours: Tuesday-Friday 10:30 AM - 12:00 PM  Assistance with lodging, prescriptions, ID’s, and work clothing    Emergency Assistance  Phone: [phone]  Calls accepted between 10:00 - 10:30 AM Tuesdays and Fridays.  Assistance with rent, utilities and furniture.        [place] [facility]  [address] [place] [address]  [phone]    Hours:  Mon. 2:30pm - 5:00pm     Services:  Provides emergency food and limited assistance with financial needs including rent,  utilities, and prescriptions  Serves [facility]  [address] [place] [address]  [email or web address]  [phone] Ext [phone]    Services:  Provides food assistance to [facility] residents in need  May also provide hygiene items  May offer assistance with emergency rent   *must have qualifying reason  May offer assistance with utilities (gas/electric) payments   *must have disconnection notice    Hours:  Friday 9:00am - 4:00pm by appointment only   * call during the week for

## 2025-03-30 ENCOUNTER — HOSPITAL ENCOUNTER (EMERGENCY)
Age: 70
Discharge: HOME | End: 2025-03-30
Payer: MEDICARE

## 2025-03-30 VITALS
RESPIRATION RATE: 16 BRPM | TEMPERATURE: 98.1 F | HEIGHT: 71 IN | BODY MASS INDEX: 33.6 KG/M2 | WEIGHT: 240 LBS | HEART RATE: 76 BPM | DIASTOLIC BLOOD PRESSURE: 86 MMHG | SYSTOLIC BLOOD PRESSURE: 126 MMHG | OXYGEN SATURATION: 96 %

## 2025-03-30 DIAGNOSIS — Z59.00 HOMELESSNESS: ICD-10-CM

## 2025-03-30 DIAGNOSIS — R60.0 PERIPHERAL EDEMA: Primary | ICD-10-CM

## 2025-03-30 PROCEDURE — 6370000000 HC RX 637 (ALT 250 FOR IP): Performed by: PHYSICIAN ASSISTANT

## 2025-03-30 PROCEDURE — 99283 EMERGENCY DEPT VISIT LOW MDM: CPT

## 2025-03-30 RX ORDER — FUROSEMIDE 20 MG/1
40 TABLET ORAL ONCE
Status: COMPLETED | OUTPATIENT
Start: 2025-03-30 | End: 2025-03-30

## 2025-03-30 RX ADMIN — FUROSEMIDE 40 MG: 20 TABLET ORAL at 21:58

## 2025-03-30 RX ADMIN — APIXABAN 5 MG: 5 TABLET, FILM COATED ORAL at 21:59

## 2025-03-30 SDOH — ECONOMIC STABILITY - HOUSING INSECURITY: HOMELESSNESS UNSPECIFIED: Z59.00

## 2025-03-30 ASSESSMENT — PAIN - FUNCTIONAL ASSESSMENT: PAIN_FUNCTIONAL_ASSESSMENT: 0-10

## 2025-03-30 ASSESSMENT — PAIN SCALES - GENERAL: PAINLEVEL_OUTOF10: 4

## 2025-03-31 ENCOUNTER — HOSPITAL ENCOUNTER (EMERGENCY)
Age: 70
Discharge: HOME | End: 2025-03-31
Attending: EMERGENCY MEDICINE
Payer: MEDICARE

## 2025-03-31 VITALS
RESPIRATION RATE: 18 BRPM | TEMPERATURE: 97.6 F | DIASTOLIC BLOOD PRESSURE: 67 MMHG | SYSTOLIC BLOOD PRESSURE: 137 MMHG | HEART RATE: 65 BPM | OXYGEN SATURATION: 100 %

## 2025-03-31 DIAGNOSIS — Z76.0 ENCOUNTER FOR MEDICATION REFILL: ICD-10-CM

## 2025-03-31 DIAGNOSIS — Z59.00 HOMELESSNESS: Primary | ICD-10-CM

## 2025-03-31 PROCEDURE — 99283 EMERGENCY DEPT VISIT LOW MDM: CPT

## 2025-03-31 RX ORDER — QUETIAPINE FUMARATE 25 MG/1
50 TABLET, FILM COATED ORAL DAILY
Qty: 60 TABLET | Refills: 0 | Status: SHIPPED | OUTPATIENT
Start: 2025-03-31

## 2025-03-31 RX ORDER — ASPIRIN 81 MG/1
81 TABLET, CHEWABLE ORAL DAILY
Qty: 30 TABLET | Refills: 1 | Status: SHIPPED | OUTPATIENT
Start: 2025-03-31

## 2025-03-31 RX ORDER — DIVALPROEX SODIUM 500 MG/1
2000 TABLET, DELAYED RELEASE ORAL DAILY
Qty: 120 TABLET | Refills: 0 | Status: SHIPPED | OUTPATIENT
Start: 2025-03-31 | End: 2025-04-30

## 2025-03-31 RX ORDER — LITHIUM CARBONATE 300 MG
600 TABLET ORAL 2 TIMES DAILY
Qty: 120 TABLET | Refills: 0 | Status: SHIPPED | OUTPATIENT
Start: 2025-03-31 | End: 2025-04-30

## 2025-03-31 RX ORDER — ACETAMINOPHEN 500 MG
1000 TABLET ORAL EVERY 6 HOURS PRN
Qty: 120 TABLET | Refills: 0 | Status: SHIPPED | OUTPATIENT
Start: 2025-03-31

## 2025-03-31 RX ORDER — PRIMIDONE 50 MG/1
100 TABLET ORAL 2 TIMES DAILY
Qty: 120 TABLET | Refills: 0 | Status: SHIPPED | OUTPATIENT
Start: 2025-03-31 | End: 2025-04-30

## 2025-03-31 RX ORDER — POTASSIUM CHLORIDE 750 MG/1
10 TABLET, EXTENDED RELEASE ORAL DAILY
Qty: 30 TABLET | Refills: 1 | Status: SHIPPED | OUTPATIENT
Start: 2025-03-31

## 2025-03-31 RX ORDER — CARVEDILOL 3.12 MG/1
3.12 TABLET ORAL 2 TIMES DAILY WITH MEALS
Qty: 60 TABLET | Refills: 1 | Status: SHIPPED | OUTPATIENT
Start: 2025-03-31

## 2025-03-31 RX ORDER — FUROSEMIDE 40 MG/1
40 TABLET ORAL 2 TIMES DAILY
Qty: 60 TABLET | Refills: 1 | Status: SHIPPED | OUTPATIENT
Start: 2025-03-31

## 2025-03-31 RX ORDER — RISPERIDONE 3 MG/1
3 TABLET ORAL NIGHTLY
Qty: 30 TABLET | Refills: 0 | Status: SHIPPED | OUTPATIENT
Start: 2025-03-31 | End: 2025-04-30

## 2025-03-31 SDOH — ECONOMIC STABILITY - HOUSING INSECURITY: HOMELESSNESS UNSPECIFIED: Z59.00

## 2025-03-31 ASSESSMENT — PAIN SCALES - GENERAL
PAINLEVEL_OUTOF10: 2
PAINLEVEL_OUTOF10: 2

## 2025-03-31 ASSESSMENT — PAIN DESCRIPTION - ORIENTATION: ORIENTATION: RIGHT;LEFT

## 2025-03-31 ASSESSMENT — PAIN DESCRIPTION - DESCRIPTORS
DESCRIPTORS: ACHING
DESCRIPTORS: ACHING

## 2025-03-31 ASSESSMENT — PAIN DESCRIPTION - LOCATION
LOCATION: LEG
LOCATION: LEG

## 2025-03-31 NOTE — PROGRESS NOTES
Discharge education reviewed. Questions answered. Medications clarified. Belongings gathered. Discharge instructions signed. All belongings accounted for. Patient discharged to lobby via guidance of ER CM with Insurance number and number to call for ride. Lynda ER triage RN notified.

## 2025-03-31 NOTE — DISCHARGE INSTRUCTIONS
All of these apartments are listed as income based:  [place]   [address] [place] [address]  [phone]    [address]  [address] [place] [address]  [phone]    [facility]  [address] [place] [address]  [phone]    [facility]   [address] [place] [address]  [phone]    [employer] [facility] [place]   [phone]    [facility] [address]  [address] [place] [address]  [phone]    [facility] [address]  [address] [place] [address]  [phone]    [place]  [address] [place] [address]  [phone]    [facility] ([facility])    <Have MANY sites>  [address], [place]   [phone]  [facility]   [address] [place] [address]  [phone]    [facility] [facility]  [address] [place] [address]  [phone]    [place]   [address] [place] [address]  [phone]. [phone]    [facility]  [address] [place] [address]  [phone]. [phone]    [facility] [facility]  [address] [place] [address]  [phone]        HOMELESS SHELTER  [facility] ([facility]  [address] [place] [address]  Main ph# [phone]  Intake ph# [phone]  Not able to be housed if have open charges or domestic violence within 2 years.

## 2025-03-31 NOTE — CARE COORDINATION
Room ED-19--CM spoke with Mr Cameron , he stated concern for his calf swelling bi laterally , as this is not new . He has had prescription medication for this , but has not picked up the medications per his own admission . His pharmacy was listed as [facility] [address]--this should be changed to [facility]  outpatient pharmacy for ease of access. Pt is homeless , has been banned from shelters due to violence and non-compliance . He then asked to be sent to [facility]--but admitted that it was for homelessness not any behavioral issues --of which he has stated or exhibited none.  Informed pt that homelessness was not a reason to pend pt for a behavioral stay.    --18:40--Pt released from Er , walked out on his own .

## 2025-03-31 NOTE — CARE COORDINATION
CM review of pt chart for discharge needs. Community resources provided in AVS for continued outpatient care needs.  Pt walked into ER to be seen, medically cleared for discharge and asking for a ride pt given the # for [employer] insurance transportation so he can call for a ride.   RICARDO, RN/CM

## 2025-03-31 NOTE — ED PROVIDER NOTES
EMERGENCY DEPARTMENT ENCOUNTER        Pt Name: Jhonatan Cameron  MRN: [id number]  Birthdate 1955  Date of evaluation: 3/30/2025  Provider: Reina Varma PA-C  PCP: Kaleb Ziegler APRN - CNP    MASSIEL. I have evaluated this patient.        Triage CHIEF COMPLAINT       Chief Complaint   Patient presents with    Leg Pain     States has chronic pain in BLE. Unable to fill prescriptions that was given to him.           HISTORY OF PRESENT ILLNESS      Chief Complaint: Leg swelling    Jhonatan Cameron is a 69 y.o. male who presents for bilateral LE edema.  Patient reports this is a chronic issue.  He first tells me that he went to [facility] ED and they gave him a 2 day refill but he thinks that it should have been 3 days.  However, when I asked patient about having a week's worth of refills with him on his visit to our ED on 3/28, he informs me that he actually tripped and dropped them in a puddle at \"[facility]\" and they were all destroyed from getting wet.  Then he says that he has to walk everywhere, so he hasn't been able to get to the pharmacy to  his medications.  He is upset that the homeless shelter won't let him in because he apparently did not follow their time guidelines.         Nursing Notes were all reviewed and agreed with or any disagreements were addressed in the HPI.    REVIEW OF SYSTEMS     CONSTITUTIONAL:  Denies fever.  EYES:  Denies visual changes.  HEAD:  Denies headache.  ENT:  Denies earache, nasal congestion, sore throat.  NECK:  Denies neck pain.  RESPIRATORY:  Denies any shortness of breath.  CARDIOVASCULAR:  Denies chest pain.  GI:  Denies nausea or vomiting.    :  Denies urinary symptoms.  MUSCULOSKELETAL:  + LE edema.  BACK:  Denies back pain.  INTEGUMENT:  Denies skin changes.  LYMPHATIC:  Denies lymphadenopathy.  NEUROLOGIC:  Denies any numbness/tingling.  PSYCHIATRIC:  Denies SI/HI.    PAST MEDICAL HISTORY     Past Medical History:   Diagnosis Date

## 2025-03-31 NOTE — DISCHARGE INSTRUCTIONS
[facility]  Website: [email or web address]  Phone Number: [phone] ([facility] intake office [phone])  Hours: Open 24 hours/day, 365 days/year  Cost: None  Insurance: N/A  Address: [address] [place] [address]      [facility]  Website: [facility]  Phone Number: [phone]  Hours: M-F 9am-4pm; closed for lunch noon - 1 pm  Cost: Based on income eligibility  Insurance: N/A  Address: [address] [place] [address]      [facility]  Website: www.[facility]  Phone Number: [phone]  Hours: Office hours M-F 8am-5pm; 24/7 residential treatment center  Cost: Program fees vary according to client income  Insurance: N/A  Address: [address] [address]      [facility]  Website: [email or web address]  Phone Number: [phone]  Hours: M-Th 8:30am-5pm, F 8:30am-2pm  Cost: Varies; financial assistance available  Insurance: N/A  Address: [address]  Website: [email or web address]  Phone Number: [phone]  Hours: W,Th,F 10 am-6 pm, Sat 9 am-3 pm  Cost: Call for qualification information  Insurance: N/A  Address: [address], [place] [address] [place] [address] ([facility])  Website: [email or web address]  Email: [email or web address]  Phone Number: [phone]  Hours: M-F 9:30-3pm  Cost: Call for qualification information  Insurance: N/A  Address: [address] [employer]  Website: www.[email or web address]  Phone Number: [phone] or [phone]  Hours: M-F 8am-4:30pm  Cost: Donation  Insurance: N/A  Address: [address], [place], [place], [facility] [address]

## 2025-03-31 NOTE — ED PROVIDER NOTES
times 3.  No focal neuro deficits.             Psychiatric:  calm and cooperative at this time      MDM:  CC/HPI Summary, DDx, ED Course, and Reassessment:   69 y.o. M who is homeless and requesting assistance and a medication refill.  Care management consulted.  The patient's care plan noted above.  Chart reviewed.  Medication refills ordered.  Security consulted for support upon discharge based on care plan.  Patient was cooperative at this time.  Care management met with the patient.             Patient was given the following medications:  Medications - No data to display    Discussion with Other Profesionals : Case Management      History from : Patient    Limitations to history : None      Chronic conditions affecting care: Jhonatan Cameron  has a past medical history of Arthritis, Bipolar 1 disorder (HCC), COPD (chronic obstructive pulmonary disease) (HCC), Hx of blood clots, Hyperlipidemia, Hypertension, Neuropathy, Schizo affective schizophrenia (HCC), Tremors of nervous system, WD-Arterial insufficiency of lower extremity ([facility]), WD-Cellulitis of left lower leg, WD-Venous insufficiency of both lower extremities, and WDCellulitis of leg, right.  Patient’s care impacted by chronic condition: schizophrenia How it impacts care: he has been homeless and kicked out of the shelter, currently calm and cooperative.  Has a care plan.    Social Determinants : Patient is Homeless: Provided resource guide, Patient lacks transportation: , and Patient has significant healthcare illiteracy. Additional time provided in explanations.    Records Reviewed : Source care plan as noted above    Differential diagnosis associated with the patient's presentation and disposition considerations (tests considered but not done, Shared Decision Making, Pt Expectation of Test or Tx.):   Homelessness, medication refill    Appropriate for outpatient management      This patient is not complaining of chest pain or dizziness.  They are not

## 2025-03-31 NOTE — ED TRIAGE NOTES
Patient presents to the ED via EMS with complaints of bilateral lower leg pain. Patient was seen yesterday for the same thing. Patient states he has not had his lasix in 2 days because he did not  his prescription. Patient told EMS he walked about 2.5 miles today. Rates pain 4/10 at this time.

## 2025-05-19 ENCOUNTER — HOSPITAL ENCOUNTER (EMERGENCY)
Age: 70
Discharge: HOME | End: 2025-05-19
Payer: MEDICARE

## 2025-05-19 VITALS
TEMPERATURE: 98.2 F | SYSTOLIC BLOOD PRESSURE: 153 MMHG | DIASTOLIC BLOOD PRESSURE: 79 MMHG | HEART RATE: 80 BPM | RESPIRATION RATE: 18 BRPM | OXYGEN SATURATION: 100 %

## 2025-05-19 DIAGNOSIS — I89.0 LYMPHEDEMA: Primary | ICD-10-CM

## 2025-05-19 DIAGNOSIS — R60.9 DEPENDENT EDEMA: ICD-10-CM

## 2025-05-19 PROCEDURE — 99283 EMERGENCY DEPT VISIT LOW MDM: CPT

## 2025-05-19 PROCEDURE — 6370000000 HC RX 637 (ALT 250 FOR IP): Performed by: PHYSICIAN ASSISTANT

## 2025-05-19 RX ORDER — FUROSEMIDE 10 MG/ML
80 INJECTION INTRAMUSCULAR; INTRAVENOUS ONCE
Status: DISCONTINUED | OUTPATIENT
Start: 2025-05-19 | End: 2025-05-19

## 2025-05-19 RX ORDER — FUROSEMIDE 20 MG/1
80 TABLET ORAL ONCE
Status: COMPLETED | OUTPATIENT
Start: 2025-05-19 | End: 2025-05-19

## 2025-05-19 RX ADMIN — FUROSEMIDE 80 MG: 20 TABLET ORAL at 20:50

## 2025-05-19 ASSESSMENT — PAIN SCALES - GENERAL: PAINLEVEL_OUTOF10: 0

## 2025-05-19 ASSESSMENT — PAIN DESCRIPTION - ORIENTATION: ORIENTATION: RIGHT;LEFT

## 2025-05-19 ASSESSMENT — PAIN - FUNCTIONAL ASSESSMENT
PAIN_FUNCTIONAL_ASSESSMENT: 0-10
PAIN_FUNCTIONAL_ASSESSMENT: 0-10

## 2025-05-19 ASSESSMENT — PAIN DESCRIPTION - PAIN TYPE: TYPE: ACUTE PAIN

## 2025-05-19 ASSESSMENT — PAIN DESCRIPTION - LOCATION: LOCATION: LEG;TOE (COMMENT WHICH ONE)

## 2025-05-20 NOTE — ED NOTES
Found pt sleeping in room after being d/c.  Went in and spoke with him that he was d/c, asked if he had additional questions and requested that he go out to the waiting room.  He stated that the provider was suppose to bring toe nail clippers. Explained that we do not have those in the ED.   PT stated he would wait in the waiting room.

## 2025-05-20 NOTE — ED PROVIDER NOTES
Triage Chief Complaint:   Leg Swelling (Bilateral/) and Toe Injury    [place]:  Today in the ED I had the pleasure of caring for Jhonatan Cameron who is a 70 y.o. male that presents today to the ED for evaluation for bilateral leg swelling.  Context is patient is homeless has a history of lymphedema as chronic lower extremity swelling.  [place]  Did not get to raise his legs or leg to rest.  And since then his legs have been significantly swollen and has some sensation of tightness no history of DVT.  No paresthesias.  No trauma to the legs.  Requests IV Lasix as he does take 40 mg of p.o. Lasix twice daily.  States \"whenever this happens I need Lasix to help me\"    He denies any chest pain or shortness of breath.  No fevers chills nausea from diarrhea.    Patient also states his toenails are getting too long he does not have exposed toenail clipper.  Request that we clip his toenails    ROS:  REVIEW OF SYSTEMS    At least 10 systems reviewed      All other review of systems are negative  See HPI and nursing notes for additional information       Past Medical History:   Diagnosis Date    Arthritis     Bipolar 1 disorder (HCC)     COPD (chronic obstructive pulmonary disease) ([facility])     \"use to have copd but is better\"    Hx of blood clots     \"had blood clot in lung and in my legs- that was in 2016- on Eliquis\"    Hyperlipidemia     Hypertension     Neuropathy     Schizo affective schizophrenia (HCC)     \"follow with Dr GERHARD Ramirez at [facility]    Tremors of nervous system     WD-Arterial insufficiency of lower extremity (HCC) 2/4/2021    WD-Cellulitis of left lower leg 2/4/2021    WD-Venous insufficiency of both lower extremities 2/4/2021    WDCellulitis of leg, right 2/4/2021     Past Surgical History:   Procedure Laterality Date    COLONOSCOPY  last one 2012    DENTAL SURGERY      \"wisdom teeth put to sleep- yrs ago\"    FRACTURE SURGERY  age 25    fx left fibia and tibia-

## 2025-05-21 ENCOUNTER — HOSPITAL ENCOUNTER (EMERGENCY)
Age: 70
Discharge: HOME | End: 2025-05-22
Attending: EMERGENCY MEDICINE
Payer: MEDICARE

## 2025-05-21 VITALS
HEIGHT: 71 IN | WEIGHT: 250 LBS | SYSTOLIC BLOOD PRESSURE: 134 MMHG | TEMPERATURE: 97.9 F | RESPIRATION RATE: 20 BRPM | BODY MASS INDEX: 35 KG/M2 | HEART RATE: 82 BPM | OXYGEN SATURATION: 94 % | DIASTOLIC BLOOD PRESSURE: 74 MMHG

## 2025-05-21 DIAGNOSIS — M79.89 LEG SWELLING: Primary | ICD-10-CM

## 2025-05-21 PROCEDURE — 99283 EMERGENCY DEPT VISIT LOW MDM: CPT

## 2025-05-22 NOTE — ED TRIAGE NOTES
States he's been walking a lot, and his welling was worse, and said he had nowhere to stay tonight, and got kicked out of his hotel.

## 2025-05-22 NOTE — ED PROVIDER NOTES
infection.  SKIN: Warm and dry.  NEUROLOGICAL: No gross facial drooping. Moves all 4 extremities spontaneously.  PSYCHIATRIC: Normal mood.    I have reviewed and interpreted all of the currently available lab results from this visit (if applicable):  No results found for this visit on 05/21/25.   Radiographs (if obtained):  [] The following radiograph was interpreted by myself in the absence of a radiologist:  [] Radiologist's Report Reviewed:    Medical Decision Making and ED Course:    CC/HPI Summary, DDx, ED Course, and Reassessment: Patient presents as above with acute on chronic edema/lymphedema of his lower extremities bilaterally without signs of infection or DVT.  Vital signs are normal.  Denies cardiopulmonary issues and I do not suspect CHF.  Legs were rewrapped, plan for continued supportive care and return for new or worsening symptoms.    Discharged in stable condition.    History from : Patient    Limitations to history : None    Patient was given the following medications:  Medications - No data to display    Independent Imaging Interpretation by me:     EKG (if obtained): (All EKG's are interpreted by myself in the absence of a cardiologist)     Chronic conditions affecting care: leg edema, VTE on Eliquis    Discussion with Other Profesionals : None    Social Determinants : None    Disposition Considerations (tests considered but not done, Shared Decision Making, Pt Expectation of Test or Tx.):     Appropriate for outpatient management      I am the Primary Clinician of Record.                  Clinical Impression:  1. Leg swelling      (Please note that portions of this note may have been completed with a voice recognition program. Efforts were made to edit the dictations but occasionally words are mis-transcribed.)    MD PRASHANTH Truong Ryan, MD  05/21/25 0618

## 2025-05-25 ENCOUNTER — HOSPITAL ENCOUNTER (EMERGENCY)
Age: 70
Discharge: HOME | End: 2025-05-25
Attending: EMERGENCY MEDICINE
Payer: MEDICARE

## 2025-05-25 VITALS
WEIGHT: 245 LBS | HEART RATE: 79 BPM | DIASTOLIC BLOOD PRESSURE: 89 MMHG | RESPIRATION RATE: 18 BRPM | HEIGHT: 71 IN | SYSTOLIC BLOOD PRESSURE: 119 MMHG | OXYGEN SATURATION: 99 % | TEMPERATURE: 98.2 F | BODY MASS INDEX: 34.3 KG/M2

## 2025-05-25 DIAGNOSIS — L03.119 CELLULITIS OF LOWER EXTREMITY, UNSPECIFIED LATERALITY: ICD-10-CM

## 2025-05-25 DIAGNOSIS — M79.89 LEG SWELLING: Primary | ICD-10-CM

## 2025-05-25 LAB
ALBUMIN SERPL-MCNC: 3.8 G/DL (ref 3.4–5)
ALBUMIN/GLOB SERPL: 1.2 {RATIO} (ref 1.1–2.2)
ALP SERPL-CCNC: 105 U/L (ref 40–129)
ALT SERPL-CCNC: 22 U/L (ref 10–40)
ANION GAP SERPL CALCULATED.3IONS-SCNC: 12 MMOL/L (ref 9–17)
AST SERPL-CCNC: 46 U/L (ref 15–37)
BASOPHILS # BLD: 0.04 K/UL
BASOPHILS NFR BLD: 0 % (ref 0–1)
BILIRUB SERPL-MCNC: 0.5 MG/DL (ref 0–1)
BUN SERPL-MCNC: 19 MG/DL (ref 7–20)
CALCIUM SERPL-MCNC: 9.1 MG/DL (ref 8.3–10.6)
CHLORIDE SERPL-SCNC: 98 MMOL/L (ref 99–110)
CO2 SERPL-SCNC: 24 MMOL/L (ref 21–32)
CREAT SERPL-MCNC: 1.1 MG/DL (ref 0.8–1.3)
EOSINOPHIL # BLD: 0.29 K/UL
EOSINOPHILS RELATIVE PERCENT: 3 % (ref 0–3)
ERYTHROCYTE [DISTWIDTH] IN BLOOD BY AUTOMATED COUNT: 14.7 % (ref 11.7–14.9)
GFR, ESTIMATED: 70 ML/MIN/1.73M2
GLUCOSE SERPL-MCNC: 105 MG/DL (ref 74–99)
HCT VFR BLD AUTO: 42.2 % (ref 42–52)
HGB BLD-MCNC: 13.6 G/DL (ref 13.5–18)
IMM GRANULOCYTES # BLD AUTO: 0.05 K/UL
IMM GRANULOCYTES NFR BLD: 1 %
LYMPHOCYTES NFR BLD: 1.26 K/UL
LYMPHOCYTES RELATIVE PERCENT: 13 % (ref 24–44)
MCH RBC QN AUTO: 30 PG (ref 27–31)
MCHC RBC AUTO-ENTMCNC: 32.2 G/DL (ref 32–36)
MCV RBC AUTO: 93 FL (ref 78–100)
MONOCYTES NFR BLD: 0.73 K/UL
MONOCYTES NFR BLD: 8 % (ref 0–5)
NEUTROPHILS NFR BLD: 75 % (ref 36–66)
NEUTS SEG NFR BLD: 7 K/UL
PLATELET # BLD AUTO: 181 K/UL (ref 140–440)
PMV BLD AUTO: 9.4 FL (ref 7.5–11.1)
POTASSIUM SERPL-SCNC: 3.9 MMOL/L (ref 3.5–5.1)
PROT SERPL-MCNC: 6.8 G/DL (ref 6.4–8.2)
RBC # BLD AUTO: 4.54 M/UL (ref 4.6–6.2)
SODIUM SERPL-SCNC: 134 MMOL/L (ref 136–145)
WBC OTHER # BLD: 9.4 K/UL (ref 4–10.5)

## 2025-05-25 PROCEDURE — 6370000000 HC RX 637 (ALT 250 FOR IP): Performed by: EMERGENCY MEDICINE

## 2025-05-25 PROCEDURE — 99283 EMERGENCY DEPT VISIT LOW MDM: CPT

## 2025-05-25 PROCEDURE — 85025 COMPLETE CBC W/AUTO DIFF WBC: CPT

## 2025-05-25 PROCEDURE — 36415 COLL VENOUS BLD VENIPUNCTURE: CPT

## 2025-05-25 PROCEDURE — 80053 COMPREHEN METABOLIC PANEL: CPT

## 2025-05-25 RX ORDER — SULFAMETHOXAZOLE AND TRIMETHOPRIM 800; 160 MG/1; MG/1
1 TABLET ORAL ONCE
Status: COMPLETED | OUTPATIENT
Start: 2025-05-25 | End: 2025-05-25

## 2025-05-25 RX ORDER — FUROSEMIDE 10 MG/ML
40 INJECTION INTRAMUSCULAR; INTRAVENOUS ONCE
Status: DISCONTINUED | OUTPATIENT
Start: 2025-05-25 | End: 2025-05-25

## 2025-05-25 RX ORDER — SULFAMETHOXAZOLE AND TRIMETHOPRIM 800; 160 MG/1; MG/1
1 TABLET ORAL 2 TIMES DAILY
Qty: 14 TABLET | Refills: 0 | Status: SHIPPED | OUTPATIENT
Start: 2025-05-25 | End: 2025-06-01

## 2025-05-25 RX ORDER — FUROSEMIDE 20 MG/1
80 TABLET ORAL ONCE
Status: COMPLETED | OUTPATIENT
Start: 2025-05-25 | End: 2025-05-25

## 2025-05-25 RX ORDER — CEPHALEXIN 500 MG/1
500 CAPSULE ORAL 4 TIMES DAILY
Qty: 28 CAPSULE | Refills: 0 | Status: SHIPPED | OUTPATIENT
Start: 2025-05-25 | End: 2025-06-01

## 2025-05-25 RX ADMIN — SULFAMETHOXAZOLE AND TRIMETHOPRIM 1 TABLET: 800; 160 TABLET ORAL at 04:40

## 2025-05-25 RX ADMIN — FUROSEMIDE 80 MG: 20 TABLET ORAL at 04:40

## 2025-05-25 RX ADMIN — CEPHALEXIN 500 MG: 250 CAPSULE ORAL at 04:40

## 2025-05-25 ASSESSMENT — PAIN - FUNCTIONAL ASSESSMENT: PAIN_FUNCTIONAL_ASSESSMENT: 0-10

## 2025-05-25 ASSESSMENT — PAIN SCALES - GENERAL
PAINLEVEL_OUTOF10: 0
PAINLEVEL_OUTOF10: 4

## 2025-05-25 ASSESSMENT — PAIN DESCRIPTION - PAIN TYPE: TYPE: ACUTE PAIN

## 2025-05-25 ASSESSMENT — PAIN DESCRIPTION - LOCATION: LOCATION: GENERALIZED

## 2025-05-25 NOTE — DISCHARGE INSTRUCTIONS
Continue with elevating your legs to help with swelling.     You are being prescribed antibiotics.     If you develop any worsening or concerning symptoms, please seek immediate medical attention.

## 2025-05-25 NOTE — ED PROVIDER NOTES
[facility] EMERGENCY DEPARTMENT  EMERGENCY DEPARTMENT ENCOUNTER      Pt Name: Jhonatan Cameron  MRN: [id number]  Birthdate 1955  Date of evaluation: 5/25/2025  Provider: Albina Mcnair MD    CHIEF COMPLAINT       Chief Complaint   Patient presents with    Leg Swelling     Bilateral leg swelling seen at [facility] yesterday for the same thing  States he has nowhere else to go         HISTORY OF PRESENT ILLNESS      Jhonatan Cameron is a 70 y.o. male who presents to the emergency department  for   Chief Complaint   Patient presents with    Leg Swelling     Bilateral leg swelling seen at [facility] yesterday for the same thing  States he has nowhere else to go       70-year-old male presents with concern for cellulitis in bilateral lower legs.  He has chronic leg swelling and lymphedema.  He is also on domiciled.  He reports has been taking his Lasix routinely but due to his living situation he is unable to elevate his legs.  Denies any fever, chills other constitutional infectious symptoms.  Denies any difficulty walking.  No other remarkable symptoms.  Presents ambulatory with a GCS of 15.  No trauma to his legs          Nursing Notes, Triage Notes & Vital Signs were reviewed.      REVIEW OF SYSTEMS    (2-9 systems for level 4, 10 or more for level 5)     Review of Systems   Cardiovascular:  Positive for leg swelling.       Except as noted above the remainder of the review of systems was reviewed and negative.       PAST MEDICAL HISTORY     Past Medical History:   Diagnosis Date    Arthritis     Bipolar 1 disorder (HCC)     COPD (chronic obstructive pulmonary disease) (HCC)     \"use to have copd but is better\"    Hx of blood clots     \"had blood clot in lung and in my legs- that was in 2016- on Eliquis\"    Hyperlipidemia     Hypertension     Neuropathy     Schizo affective schizophrenia (HCC)     \"follow with Dr GERHARD Ramirez at [facility]    Tremors of nervous system     WD-Arterial insufficiency of

## 2025-05-26 ENCOUNTER — HOSPITAL ENCOUNTER (EMERGENCY)
Age: 70
Discharge: HOME | End: 2025-05-26
Payer: MEDICARE

## 2025-05-26 VITALS
TEMPERATURE: 98.4 F | RESPIRATION RATE: 16 BRPM | HEART RATE: 72 BPM | OXYGEN SATURATION: 98 % | SYSTOLIC BLOOD PRESSURE: 131 MMHG | DIASTOLIC BLOOD PRESSURE: 60 MMHG

## 2025-05-26 VITALS
DIASTOLIC BLOOD PRESSURE: 67 MMHG | SYSTOLIC BLOOD PRESSURE: 146 MMHG | TEMPERATURE: 98.1 F | HEIGHT: 71 IN | OXYGEN SATURATION: 92 % | BODY MASS INDEX: 34.29 KG/M2 | RESPIRATION RATE: 19 BRPM | WEIGHT: 244.93 LBS

## 2025-05-26 VITALS
HEART RATE: 78 BPM | TEMPERATURE: 97.9 F | WEIGHT: 244 LBS | SYSTOLIC BLOOD PRESSURE: 117 MMHG | HEIGHT: 71 IN | BODY MASS INDEX: 34.16 KG/M2 | DIASTOLIC BLOOD PRESSURE: 65 MMHG | RESPIRATION RATE: 16 BRPM | OXYGEN SATURATION: 99 %

## 2025-05-26 DIAGNOSIS — R60.0 BILATERAL LOWER EXTREMITY EDEMA: Primary | ICD-10-CM

## 2025-05-26 DIAGNOSIS — L03.119 CELLULITIS OF LOWER EXTREMITY, UNSPECIFIED LATERALITY: Primary | ICD-10-CM

## 2025-05-26 DIAGNOSIS — Z59.00 HOMELESSNESS: ICD-10-CM

## 2025-05-26 DIAGNOSIS — Z51.89 VISIT FOR WOUND CHECK: Primary | ICD-10-CM

## 2025-05-26 LAB
ALBUMIN SERPL-MCNC: 3.9 G/DL (ref 3.4–5)
ALBUMIN/GLOB SERPL: 1.6 {RATIO} (ref 1.1–2.2)
ALP SERPL-CCNC: 99 U/L (ref 40–129)
ALT SERPL-CCNC: 22 U/L (ref 10–40)
ANION GAP SERPL CALCULATED.3IONS-SCNC: 9 MMOL/L (ref 9–17)
AST SERPL-CCNC: 28 U/L (ref 15–37)
BASOPHILS # BLD: 0.04 K/UL
BASOPHILS NFR BLD: 1 % (ref 0–1)
BILIRUB SERPL-MCNC: 0.3 MG/DL (ref 0–1)
BUN SERPL-MCNC: 18 MG/DL (ref 7–20)
CALCIUM SERPL-MCNC: 8.9 MG/DL (ref 8.3–10.6)
CHLORIDE SERPL-SCNC: 101 MMOL/L (ref 99–110)
CO2 SERPL-SCNC: 29 MMOL/L (ref 21–32)
CREAT SERPL-MCNC: 1 MG/DL (ref 0.8–1.3)
EOSINOPHIL # BLD: 0.29 K/UL
EOSINOPHILS RELATIVE PERCENT: 3 % (ref 0–3)
ERYTHROCYTE [DISTWIDTH] IN BLOOD BY AUTOMATED COUNT: 15.1 % (ref 11.7–14.9)
GFR, ESTIMATED: 72 ML/MIN/1.73M2
GLUCOSE SERPL-MCNC: 65 MG/DL (ref 74–99)
HCT VFR BLD AUTO: 40.3 % (ref 42–52)
HGB BLD-MCNC: 12.9 G/DL (ref 13.5–18)
IMM GRANULOCYTES # BLD AUTO: 0.03 K/UL
IMM GRANULOCYTES NFR BLD: 0 %
LYMPHOCYTES NFR BLD: 1.41 K/UL
LYMPHOCYTES RELATIVE PERCENT: 16 % (ref 24–44)
MCH RBC QN AUTO: 29.5 PG (ref 27–31)
MCHC RBC AUTO-ENTMCNC: 32 G/DL (ref 32–36)
MCV RBC AUTO: 92.2 FL (ref 78–100)
MONOCYTES NFR BLD: 0.61 K/UL
MONOCYTES NFR BLD: 7 % (ref 0–5)
NEUTROPHILS NFR BLD: 72 % (ref 36–66)
NEUTS SEG NFR BLD: 6.27 K/UL
PLATELET # BLD AUTO: 195 K/UL (ref 140–440)
PMV BLD AUTO: 9.2 FL (ref 7.5–11.1)
POTASSIUM SERPL-SCNC: 3.7 MMOL/L (ref 3.5–5.1)
PROT SERPL-MCNC: 6.3 G/DL (ref 6.4–8.2)
RBC # BLD AUTO: 4.37 M/UL (ref 4.6–6.2)
SODIUM SERPL-SCNC: 139 MMOL/L (ref 136–145)
WBC OTHER # BLD: 8.7 K/UL (ref 4–10.5)

## 2025-05-26 PROCEDURE — 6370000000 HC RX 637 (ALT 250 FOR IP): Performed by: NURSE PRACTITIONER

## 2025-05-26 PROCEDURE — 80053 COMPREHEN METABOLIC PANEL: CPT

## 2025-05-26 PROCEDURE — 85025 COMPLETE CBC W/AUTO DIFF WBC: CPT

## 2025-05-26 PROCEDURE — 6360000002 HC RX W HCPCS: Performed by: NURSE PRACTITIONER

## 2025-05-26 PROCEDURE — 6370000000 HC RX 637 (ALT 250 FOR IP)

## 2025-05-26 PROCEDURE — 99283 EMERGENCY DEPT VISIT LOW MDM: CPT

## 2025-05-26 PROCEDURE — 2500000003 HC RX 250 WO HCPCS: Performed by: NURSE PRACTITIONER

## 2025-05-26 PROCEDURE — 6370000000 HC RX 637 (ALT 250 FOR IP): Performed by: PHYSICIAN ASSISTANT

## 2025-05-26 RX ORDER — GINSENG 100 MG
CAPSULE ORAL ONCE
Status: COMPLETED | OUTPATIENT
Start: 2025-05-26 | End: 2025-05-26

## 2025-05-26 RX ORDER — SULFAMETHOXAZOLE AND TRIMETHOPRIM 800; 160 MG/1; MG/1
1 TABLET ORAL ONCE
Status: COMPLETED | OUTPATIENT
Start: 2025-05-26 | End: 2025-05-26

## 2025-05-26 RX ORDER — FUROSEMIDE 20 MG/1
80 TABLET ORAL ONCE
Status: COMPLETED | OUTPATIENT
Start: 2025-05-26 | End: 2025-05-26

## 2025-05-26 RX ADMIN — BACITRACIN 1 PACKET: 500 OINTMENT TOPICAL at 09:32

## 2025-05-26 RX ADMIN — SULFAMETHOXAZOLE AND TRIMETHOPRIM 1 TABLET: 800; 160 TABLET ORAL at 18:31

## 2025-05-26 RX ADMIN — FUROSEMIDE 80 MG: 20 TABLET ORAL at 01:44

## 2025-05-26 RX ADMIN — WATER 1000 MG: 1 INJECTION INTRAMUSCULAR; INTRAVENOUS; SUBCUTANEOUS at 18:30

## 2025-05-26 SDOH — ECONOMIC STABILITY - HOUSING INSECURITY: HOMELESSNESS UNSPECIFIED: Z59.00

## 2025-05-26 ASSESSMENT — PAIN SCALES - GENERAL
PAINLEVEL_OUTOF10: 6
PAINLEVEL_OUTOF10: 8
PAINLEVEL_OUTOF10: 10
PAINLEVEL_OUTOF10: 6

## 2025-05-26 ASSESSMENT — PAIN DESCRIPTION - PAIN TYPE: TYPE: ACUTE PAIN

## 2025-05-26 ASSESSMENT — PAIN - FUNCTIONAL ASSESSMENT
PAIN_FUNCTIONAL_ASSESSMENT: 0-10
PAIN_FUNCTIONAL_ASSESSMENT: ACTIVITIES ARE NOT PREVENTED
PAIN_FUNCTIONAL_ASSESSMENT: 0-10

## 2025-05-26 ASSESSMENT — PAIN DESCRIPTION - ORIENTATION: ORIENTATION: RIGHT

## 2025-05-26 ASSESSMENT — LIFESTYLE VARIABLES
HOW OFTEN DO YOU HAVE A DRINK CONTAINING ALCOHOL: NEVER
HOW MANY STANDARD DRINKS CONTAINING ALCOHOL DO YOU HAVE ON A TYPICAL DAY: PATIENT DOES NOT DRINK

## 2025-05-26 ASSESSMENT — PAIN DESCRIPTION - LOCATION
LOCATION: LEG
LOCATION: LEG

## 2025-05-26 ASSESSMENT — PAIN DESCRIPTION - FREQUENCY: FREQUENCY: CONTINUOUS

## 2025-05-26 ASSESSMENT — PAIN DESCRIPTION - ONSET: ONSET: ON-GOING

## 2025-05-26 ASSESSMENT — PAIN DESCRIPTION - DESCRIPTORS: DESCRIPTORS: ACHING

## 2025-05-26 NOTE — ED TRIAGE NOTES
Pt has cellulitis, states is \"getting worse by the minute\". Pt was seen on 5/21, 5/25 & earlier this am for the same thing. Pt is homeless & states he isn't able to elevate legs. Pt ambulated from hallway to bed without any difficulty.

## 2025-05-26 NOTE — ED NOTES
Attempts to d/c pt unsuccessful. Security notified. Pt adamant that they require IV lasix and states they need to speak w/ PA-C. Pt then became preoccupied w/ art stating they needed to rewrite the name on the top to match PA-C to provide provider. Pt then became increasingly agitated w/ this nurse r/t being discharged again. Requested assistance from security att.

## 2025-05-26 NOTE — ED NOTES
Patient telling this RN that he left is ER visit earlier today because he wanted to [place]. Pt now stating that he is hot and wants to be admitted to have his leg \"worked on\".

## 2025-05-26 NOTE — ED PROVIDER NOTES
EMERGENCY DEPARTMENT ENCOUNTER        Pt Name: Jhonatan Cameron  MRN: [id number]  Birthdate 1955  Date of evaluation: 5/26/2025  Provider: Reina Varma PA-C  PCP: Kaleb Ziegler APRN - CNP    MASSIEL. I have evaluated this patient.        Triage CHIEF COMPLAINT       Chief Complaint   Patient presents with    Leg Pain         HISTORY OF PRESENT ILLNESS      Chief Complaint: Leg swelling    Jhonatan Cameron is a 70 y.o. male who presents for his bilateral LE swelling.  This is a chronic issue 2/2 lymphedema.  He says he is being treated for a superimposed cellulitis right now as well.  [facility].  Says he is currently homeless so he is unable to prop his legs up during the day.  However, he does have a place to sleep [place] and knows that he can get a ride there from the hospital because he did that last night as well.     Nursing Notes were all reviewed and agreed with or any disagreements were addressed in the HPI.    REVIEW OF SYSTEMS     CONSTITUTIONAL:  Denies fever.  EYES:  Denies visual changes.  HEAD:  Denies headache.  ENT:  Denies earache, nasal congestion, sore throat.  NECK:  Denies neck pain.  RESPIRATORY:  Denies any shortness of breath.  CARDIOVASCULAR:  Denies chest pain.  GI:  Denies nausea or vomiting.    :  Denies urinary symptoms.  MUSCULOSKELETAL:  + bilateral LE edema.  BACK:  Denies back pain.  INTEGUMENT:  Denies skin changes.  LYMPHATIC:  Denies lymphadenopathy.  NEUROLOGIC:  Denies any numbness/tingling.  PSYCHIATRIC:  Denies SI/HI.    PAST MEDICAL HISTORY     Past Medical History:   Diagnosis Date    Arthritis     Bipolar 1 disorder (HCC)     COPD (chronic obstructive pulmonary disease) ([facility])     \"use to have copd but is better\"    Hx of blood clots     \"had blood clot in lung and in my legs- that was in 2016- on Eliquis\"    Hyperlipidemia     Hypertension     Neuropathy     Schizo affective schizophrenia ([facility])        Height Weight - Scale         05/26/25 0029 05/26/25 0029         1.803 m (5' 11\") 111.1 kg (244 lb 14.9 oz)            GENERAL APPEARANCE:  Well-developed, well-nourished, no acute distress.  HEAD:  NC/AT.  EYES:  Sclera anicteric.   ENT:  Ears, nose, mouth normal.     NECK:  Supple.  CARDIO:  RRR.  LUNGS:   CTAB.  Respirations unlabored.  ABDOMEN:  Soft, non-distended, non-tender.  BS active.  :  No CVA tenderness.  BACK:  No midline thoracic or lumbar spinal tenderness.    EXTREMITIES:  Edematous legs with some mild superimposed erythema.  No seeping or ulcerations.    SKIN:  Warm and dry.   NEUROLOGICAL:  Alert and oriented.  PSYCHIATRIC:  Normal mood.     DIAGNOSTIC RESULTS   LABS:    Labs Reviewed - No data to display    When ordered, only abnormal lab results are displayed.  All other labs were within normal range or not returned as of this dictation.    EKG:  When ordered, EKG's are interpreted by the Emergency Department Physician in the absence of a cardiologist.  Please see their note for interpretation of EKG.    RADIOLOGY:   Non-plain film images such as CT, Ultrasound and MRI are read by the radiologist.  Plain radiographic images are visualized and preliminarily interpreted by the ED Provider.    Interpretation per the Radiologist below:    No orders to display     No results found.      PROCEDURES   Unless otherwise noted below, none.        CONSULTS:  None      EMERGENCY DEPARTMENT COURSE and MDM:   Vitals:    Vitals:    05/26/25 0029 05/26/25 0030 05/26/25 0144   BP:  122/62 (!) 146/67   Resp:  19    Temp:  98.1 °F (36.7 °C)    TempSrc:  Oral    SpO2:  92%    Weight: 111.1 kg (244 lb 14.9 oz)     Height: 1.803 m (5' 11\")         Patient was given thefollowing medications:  Medications   furosemide (LASIX) tablet 80 mg (80 mg Oral Given 5/26/25 0144)         MDM:    Chief Complaint/HPI Summary/Differential Diagnosis:  Patient presents to the ED with chief complaint of LE edema.  Patient seen and

## 2025-05-26 NOTE — DISCHARGE INSTRUCTIONS
It was my pleasure taking care of you in the emergency department today.  If we prescribed any medications, please be sure to take them as prescribed. Continue taking medications as prescribed by your PCP unless we discussed otherwise.   Please be sure to follow-up with your PCP within the next 1-2 weeks.  Please don't hesitate to return to the emergency department in case of any worsening symptoms.  Wish you a speedy recovery.  Most sincerely,    Tami Mccarthy CNP

## 2025-05-26 NOTE — DISCHARGE INSTRUCTIONS
You need to get started on your antibiotics, cephalexin and Bactrim which were sent to your pharmacy as soon as possible.  Please call [facility] in the morning, advised them of these prescriptions and determine how you can get them included in your medication packs

## 2025-05-26 NOTE — ED NOTES
Pt remains in [facility] room 10 and is again advised to move to IW. Bags provided for additional belongings

## 2025-05-26 NOTE — ED TRIAGE NOTES
PT to ED for c/o cellulitis.  Stated he was seen earlier but left to go to the parade. Explained that pt was d/c with scripts and to follow up with wound care. PT stated he wanted to be seen again by the provider.

## 2025-05-26 NOTE — ED NOTES
Pt is currently begging for food, water, hot blankets and demanding his feet to put up on a chair.

## 2025-05-26 NOTE — ED NOTES
Attempting to completed vital signs, pt starts yelling at this RN & the tech in the room stating \"I'm healthy & fine there is nothing wrong with me. You don't need to hook me up to all of this.\" Explained to pt that every patients gets a set of VS on arrival & at DC for sure, but also throughout the stay just to monitor them. Pt yelling at staff stating \"no one ever has done this in before. I don't need this done.\" Explained to pt that he is more the welcome to leave if he would like, but he is the one that called EMS to bring him to the hospital & if he is my patient I would like to monitor him while he is here. Pt states well he is in a hurry & has somewhere to be as he wants to go watch the parade. Explained to pt he will probably be waiting for awhile as we are extremely busy but we will attempt to see him as soon as possible. Pt agreed to have vitals taken at this time & said he will wait for a bit but may leave.

## 2025-05-26 NOTE — ED TRIAGE NOTES
Pt arrives to the ED from a hotel reporting leg pain. Pt has been seen at this facility yesterday for the same problem.

## 2025-05-26 NOTE — ED PROVIDER NOTES
[facility] EMERGENCY DEPARTMENT  EMERGENCY DEPARTMENT ENCOUNTER        Pt Name: Jhonatan Cameron  MRN: [id number]  Birthdate 1955  Date of evaluation: 5/26/2025  Provider: EMPERATRIZ Lazaro CNP  PCP: Kaleb Ziegler APRN - CNP  Note Started: 8:51 AM EDT 5/26/25      MASSIEL. I have evaluated this patient.        CHIEF COMPLAINT       Chief Complaint   Patient presents with    Wound Check     Cellulitis bilat lower ext, has 1st wound clinic appt tomorrow. Pt now has open area to bilat legs       HISTORY OF PRESENT ILLNESS: 1 or more Elements     History From: Patient    Limitations to history : None    Social Determinants Significantly Affecting Health : None    Chief Complaint: Visit for wound check    Jhonatan Cameron is a 70 y.o. male medical history includes dysarthria, COPD, DVT with an IVC filter, HTN, HLD, lymphedema, and Class I obesity,  bipolar disorder, schizoaffective disorder, schizophrenia, shy, and anxiety, along with psychosis and aggression.   who presents to ED stating he was going to go to the parade today but he could not walk 57 miles to get to the parade.  Stated his wound started draining some and wanted to have it looked at.  Stated he would like to go to the parade.  Stated he has had multiple visits to evaluate his legs.  He denies any chest pain or shortness of breath.  States he has an appointment with [facility] wound care tomorrow and will be starting his antibiotics tomorrow.  Denies any fevers.  States he does not feel as if he needs to use telemetry wires or have lab work and he has had that all completed.  Stated he would like his dressing changed so he can get to the parade.    Nursing Notes were all reviewed and agreed with or any disagreements were addressed in the HPI.    REVIEW OF SYSTEMS :      Review of Systems    Positives and Pertinent negatives as per HPI.     SURGICAL HISTORY     Past Surgical History:   Procedure Laterality Date    COLONOSCOPY

## 2025-05-27 NOTE — ED PROVIDER NOTES
[facility] EMERGENCY DEPARTMENT  EMERGENCY DEPARTMENT ENCOUNTER        Pt Name: Jhonatan Cameron  MRN: [id number]  Birthdate 1955  Date of evaluation: 5/26/2025  Provider: EMPERATRIZ CINTRON CNP  PCP: Kaleb Ziegler APRN - CNP    MASSIEL. I have evaluated this patient.        Triage CHIEF COMPLAINT       Chief Complaint   Patient presents with    Wound Check         HISTORY OF PRESENT ILLNESS      Chief Complaint: Wound check lower extremity    Jhonatan Cameron is a 70 y.o. male who presents for evaluation of wound check of the lower extremities.  Patient came in by EMS.  He was here earlier today for same but did not want to stay to get evaluated at that time because he had \"things to do\" including going to see the parade for the holiday today and speaking to the police about a small stolen bicycle.  Patient reports that he has chronic lymphedema of the lower extremities and has had some increased swelling as well as some increasing erythema.  Was seen in the ED several times in the last few days admits that he has not gotten his antibiotics as this is managed through [facility] and he cannot see them until tomorrow after the holiday.  He denies any fevers or chills, nausea or vomiting.  He is also concerned about how he is going to get transportation back to the other area of town where he is staying.    Nursing Notes were all reviewed and agreed with or any disagreements were addressed in the HPI.    REVIEW OF SYSTEMS     Pertinent ROS as noted in HPI.      PAST MEDICAL HISTORY     Past Medical History:   Diagnosis Date    Arthritis     Bipolar 1 disorder (HCC)     COPD (chronic obstructive pulmonary disease) (HCC)     \"use to have copd but is better\"    Hx of blood clots     \"had blood clot in lung and in my legs- that was in 2016- on Eliquis\"    Hyperlipidemia     Hypertension     Neuropathy     Schizo affective schizophrenia (HCC)     \"follow with Dr GERHARD Ramirez at [facility]    Tremors of  CAPS Take 1 capsule by mouth Daily, Disp-30 capsule, R-1Normal      lithium 300 MG tablet Take 2 tablets by mouth 2 times daily Take 2 capsules (600 mg) po bid, Disp-120 tablet, R-0Normal      divalproex (DEPAKOTE) 500 MG DR tablet Take 4 tablets by mouth daily Take 4 tablets (2000 mg) po at bedtime, Disp-120 tablet, R-0Normal      primidone (MYSOLINE) 50 MG tablet Take 2 tablets by mouth in the morning and 2 tablets in the evening., Disp-120 tablet, R-0Normal      risperiDONE (RISPERDAL) 3 MG tablet Take 1 tablet by mouth nightly, Disp-30 tablet, R-0Normal             ALLERGIES     Thorazine [chlorpromazine]    FAMILYHISTORY       Family History   Problem Relation Age of Onset    Cancer Mother         breast ca age 45, then later had someother form of cancer?unsure what it was    Heart Disease Father     Stroke Father     Cancer Sister     No Known Problems Brother         SOCIAL HISTORY       Social History     Socioeconomic History    Marital status: Single   Tobacco Use    Smoking status: Never    Smokeless tobacco: Never   Vaping Use    Vaping status: Never Used   Substance and Sexual Activity    Alcohol use: No     Comment: stated has not had a drink in a year/\"quit drinking  30 yrs ago- use to drink on weekends- 4-5 beers\"    Drug use: Not Currently     Types: Marijuana (Weed)     Comment: \"last used over 30 yrs ago\"     Social Drivers of Health     Food Insecurity: No Food Insecurity (11/30/2024)    Hunger Vital Sign     Worried About Running Out of Food in the Last Year: Never true     Ran Out of Food in the Last Year: Never true   Transportation Needs: No Transportation Needs (11/30/2024)    PRAPARE - Transportation     Lack of Transportation (Medical): No     Lack of Transportation (Non-Medical): No   Housing Stability: Low Risk  (11/30/2024)    Housing Stability Vital Sign     Unable to Pay for Housing in the Last Year: No     Number of Times Moved in the Last Year: 1     Homeless in the Last Year: No  ED Provider with the below findings:    Interpretation [facility] Radiologist below, if available at the time of this note:    No orders to display     No results found.      PROCEDURES   Unless otherwise noted below, none       Procedures    CRITICAL CARE   CRITICAL CARE NOTE:  N/A    VITALS:   Vitals:    Vitals:    05/26/25 1634   BP: 131/60   Pulse: 72   Resp: 16   Temp: 98.4 °F (36.9 °C)   TempSrc: Oral   SpO2: 98%       EMERGENCY DEPARTMENT COURSE and MDM:   Patient presents as above.  Emergent etiologies considered.  Patient seen and examined.  Work-up initiated secondary to presentation, physical exam findings, vital signs and medical chart review.      Sepsis Consideration:   Exclusion criteria - the patient is NOT to be included for SEP-1 Core Measure due to:  2+ SIRS criteria are not met     History from : Patient    Limitations to history : None    Imaging Interpretation: Not applicable    Telemetry: Not Applicable    ECG Interpretation: N/A    Discussion with Other Profesionals : None    Social Determinants : Patient is Homeless: Provided resource guide and Patient lacks transportation:   which significantly impact access to care.    Records Reviewed : Source reviewed medical record, patient has been here 4 times in the last several days for similar complaints.  He was given a prescription for both Keflex and Bactrim on 5/25 by Dr. Jarrell Mcnair.  He was given a dose here before he left .  Was seen here in the early hours this morning and also given extra dose of 80 mg Lasix orally.    Chronic conditions affecting care: Bipolar disorder, history of DVT, history of lymphedema, chronic inflammatory demyelinating polyneuropathy, homelessness    Testing considered by not ordered: see MDM    Patient was given the following medications:  Medications   cefTRIAXone (ROCEPHIN) 1,000 mg in sterile water 10 mL IV syringe (1,000 mg IntraVENous Given 5/26/25 1830)   sulfamethoxazole-trimethoprim (BACTRIM DS;SEPTRA DS)

## 2025-05-28 ENCOUNTER — HOSPITAL ENCOUNTER (EMERGENCY)
Age: 70
Discharge: HOME | End: 2025-05-28
Attending: EMERGENCY MEDICINE
Payer: MEDICARE

## 2025-05-28 VITALS
HEIGHT: 71 IN | WEIGHT: 244 LBS | DIASTOLIC BLOOD PRESSURE: 77 MMHG | RESPIRATION RATE: 19 BRPM | OXYGEN SATURATION: 99 % | HEART RATE: 74 BPM | TEMPERATURE: 98.3 F | BODY MASS INDEX: 34.16 KG/M2 | SYSTOLIC BLOOD PRESSURE: 151 MMHG

## 2025-05-28 DIAGNOSIS — L03.119 CELLULITIS OF LOWER EXTREMITY, UNSPECIFIED LATERALITY: ICD-10-CM

## 2025-05-28 DIAGNOSIS — I89.0 LYMPHEDEMA: ICD-10-CM

## 2025-05-28 DIAGNOSIS — M79.89 LEG SWELLING: Primary | ICD-10-CM

## 2025-05-28 PROCEDURE — 96374 THER/PROPH/DIAG INJ IV PUSH: CPT

## 2025-05-28 PROCEDURE — 96375 TX/PRO/DX INJ NEW DRUG ADDON: CPT

## 2025-05-28 PROCEDURE — 2500000003 HC RX 250 WO HCPCS: Performed by: EMERGENCY MEDICINE

## 2025-05-28 PROCEDURE — 99284 EMERGENCY DEPT VISIT MOD MDM: CPT

## 2025-05-28 PROCEDURE — 6360000002 HC RX W HCPCS: Performed by: EMERGENCY MEDICINE

## 2025-05-28 RX ORDER — FUROSEMIDE 10 MG/ML
40 INJECTION INTRAMUSCULAR; INTRAVENOUS ONCE
Status: COMPLETED | OUTPATIENT
Start: 2025-05-28 | End: 2025-05-28

## 2025-05-28 RX ADMIN — FUROSEMIDE 40 MG: 10 INJECTION, SOLUTION INTRAMUSCULAR; INTRAVENOUS at 02:59

## 2025-05-28 RX ADMIN — CEFAZOLIN 2000 MG: 2 INJECTION, POWDER, FOR SOLUTION INTRAMUSCULAR; INTRAVENOUS at 02:50

## 2025-05-28 ASSESSMENT — PAIN DESCRIPTION - PAIN TYPE: TYPE: CHRONIC PAIN

## 2025-05-28 ASSESSMENT — PAIN - FUNCTIONAL ASSESSMENT: PAIN_FUNCTIONAL_ASSESSMENT: 0-10

## 2025-05-28 ASSESSMENT — PAIN SCALES - GENERAL: PAINLEVEL_OUTOF10: 7

## 2025-05-28 ASSESSMENT — PAIN DESCRIPTION - LOCATION: LOCATION: LEG

## 2025-05-28 ASSESSMENT — PAIN DESCRIPTION - ORIENTATION: ORIENTATION: RIGHT;LEFT

## 2025-05-28 ASSESSMENT — PAIN DESCRIPTION - DESCRIPTORS: DESCRIPTORS: BURNING;ACHING

## 2025-05-28 NOTE — ED PROVIDER NOTES
[facility] EMERGENCY DEPARTMENT  EMERGENCY DEPARTMENT ENCOUNTER      Pt Name: Jhonatan Cameron  MRN: [id number]  Birthdate 1955  Date of evaluation: 5/28/2025  Provider: Albina Mcnair MD    CHIEF COMPLAINT       Chief Complaint   Patient presents with    Leg Swelling         HISTORY OF PRESENT ILLNESS      Jhonatan Cameron is a 70 y.o. male who presents to the emergency department  for   Chief Complaint   Patient presents with    Leg Swelling       70-year-old male with history of mood disorder, chronic lymphedema presents complaining of concern for cellulitis in his legs.  He is been seen multiple times in the emergency primary for the same complaint recently.  He is undomiciled.  States he is unable to elevate his legs given that he is homeless.  States has not been able to fill any prescriptions either.  No constitutional infectious symptoms.  No injury to his legs.  Denies any numbness or tingling in his legs.  GCS of 15.  He is amatory without difficulty.  No difficulty breathing.          Nursing Notes, Triage Notes & Vital Signs were reviewed.      REVIEW OF SYSTEMS    (2-9 systems for level 4, 10 or more for level 5)     Review of Systems   Cardiovascular:  Positive for leg swelling.       Except as noted above the remainder of the review of systems was reviewed and negative.       PAST MEDICAL HISTORY     Past Medical History:   Diagnosis Date    Arthritis     Bipolar 1 disorder (HCC)     COPD (chronic obstructive pulmonary disease) (HCC)     \"use to have copd but is better\"    Hx of blood clots     \"had blood clot in lung and in my legs- that was in 2016- on Eliquis\"    Hyperlipidemia     Hypertension     Neuropathy     Schizo affective schizophrenia (HCC)     \"follow with Dr GERHARD Ramirez at [facility] [place]    Tremors of nervous system     WD-Arterial insufficiency of lower extremity (HCC) 2/4/2021    WD-Cellulitis of left lower leg 2/4/2021    WD-Venous insufficiency of both lower extremities

## 2025-05-28 NOTE — ED NOTES
Patient given discharge instructions at this time. Verbalizes understanding and denies any further needs, questions, concerns or complaints. Patient ambulatory to front of ER in standing, up position in stable condition.

## 2025-05-28 NOTE — ED TRIAGE NOTES
Patient presents to the ED via EMS from [facility], patient reports being homeless and going from restaurants and gas stations up and down [facility], then called EMS.   Chief complaint:  Initial vital signs:   Vitals:    05/28/25 0118   BP: (!) 140/70   Pulse: 77   Resp: 18   Temp: 98.3 °F (36.8 °C)   SpO2: 97%      Patient reports pain in legs, swelling in legs, history of cellulitis in legs.   Patient rates pain as 7 on a scale of 0-10, described as burning and achy. Pain is located at leg bilateral.     Patient states he called EMS because he needs IV lasix and a dressing change, also stated he needed a bed.

## 2025-05-28 NOTE — DISCHARGE INSTRUCTIONS
Please fill your existing prescription for antibiotics for further treatment.    Please follow-up with your established physician for further evaluation and management.    If you develop any worsening or concerning symptoms, please seek immediate medical evaluation

## 2025-05-29 ENCOUNTER — HOSPITAL ENCOUNTER (EMERGENCY)
Age: 70
Discharge: HOME | End: 2025-05-29
Attending: EMERGENCY MEDICINE
Payer: MEDICARE

## 2025-05-29 VITALS
SYSTOLIC BLOOD PRESSURE: 160 MMHG | RESPIRATION RATE: 18 BRPM | TEMPERATURE: 97.9 F | DIASTOLIC BLOOD PRESSURE: 137 MMHG | OXYGEN SATURATION: 99 % | HEART RATE: 68 BPM

## 2025-05-29 DIAGNOSIS — M79.89 LEG SWELLING: Primary | ICD-10-CM

## 2025-05-29 PROCEDURE — 99283 EMERGENCY DEPT VISIT LOW MDM: CPT

## 2025-05-29 NOTE — ED PROVIDER NOTES
Triage Chief Complaint:   Leg Swelling    [place]:  Jhonatan Cameron is a 70 y.o. male that presents for chron's lower extremity swelling.  States that he cannot get into the wound care center until next week.  He is currently on Lasix and Keflex but states that he is not able to elevate his legs because he is homeless.  Denies chest pain or shortness of breath.  No other complaints.    ROS:  At least 6 systems reviewed and otherwise acutely negative except as in the [place].    Past Medical History:   Diagnosis Date    Arthritis     Bipolar 1 disorder (HCC)     COPD (chronic obstructive pulmonary disease) (HCC)     \"use to have copd but is better\"    Hx of blood clots     \"had blood clot in lung and in my legs- that was in 2016- on Eliquis\"    Hyperlipidemia     Hypertension     Neuropathy     Schizo affective schizophrenia (HCC)     \"follow with Dr GERHARD Ramirez at [facility]    Tremors of nervous system     WD-Arterial insufficiency of lower extremity (HCC) 2/4/2021    WD-Cellulitis of left lower leg 2/4/2021    WD-Venous insufficiency of both lower extremities 2/4/2021    WDCellulitis of leg, right 2/4/2021     Past Surgical History:   Procedure Laterality Date    COLONOSCOPY  last one 2012    DENTAL SURGERY      \"wisdom teeth put to sleep- yrs ago\"    FRACTURE SURGERY  age 25    fx left fibia and tibia- hardware later removed    HYDROCELE EXCISION  10+ yrs ago    IR GUIDED IVC FILTER PLACEMENT N/A 07/14/2016    ALN filter lot#[id number], exp - Dr Alex-[facility]    JOINT REPLACEMENT  2012    L knee    UPPER GASTROINTESTINAL ENDOSCOPY  04/12/2018     Family History   Problem Relation Age of Onset    Cancer Mother         breast ca age 45, then later had someother form of cancer?unsure what it was    Heart Disease Father     Stroke Father     Cancer Sister     No Known Problems Brother      Social History     Socioeconomic History    Marital status: Single     Spouse name: Not on file    Number of children: Not on file    Years

## 2025-05-29 NOTE — ED TRIAGE NOTES
Pt endorses increased swelling in B/L legs. Pt states that it is cellulitis and his legs have been \"oozing\". Pt states he was just here yesterday for the same complaint.

## 2025-05-30 ENCOUNTER — HOSPITAL ENCOUNTER (EMERGENCY)
Age: 70
Discharge: HOME | End: 2025-05-30
Attending: STUDENT IN AN ORGANIZED HEALTH CARE EDUCATION/TRAINING PROGRAM
Payer: MEDICARE

## 2025-05-30 VITALS
HEART RATE: 81 BPM | DIASTOLIC BLOOD PRESSURE: 104 MMHG | RESPIRATION RATE: 18 BRPM | SYSTOLIC BLOOD PRESSURE: 119 MMHG | TEMPERATURE: 98 F | OXYGEN SATURATION: 97 % | BODY MASS INDEX: 34.16 KG/M2 | WEIGHT: 244 LBS | HEIGHT: 71 IN

## 2025-05-30 DIAGNOSIS — I87.2 EDEMA OF BOTH LOWER EXTREMITIES DUE TO PERIPHERAL VENOUS INSUFFICIENCY: Primary | ICD-10-CM

## 2025-05-30 PROCEDURE — 6370000000 HC RX 637 (ALT 250 FOR IP): Performed by: STUDENT IN AN ORGANIZED HEALTH CARE EDUCATION/TRAINING PROGRAM

## 2025-05-30 PROCEDURE — 99283 EMERGENCY DEPT VISIT LOW MDM: CPT

## 2025-05-30 RX ORDER — FUROSEMIDE 20 MG/1
80 TABLET ORAL ONCE
Status: COMPLETED | OUTPATIENT
Start: 2025-05-30 | End: 2025-05-30

## 2025-05-30 RX ADMIN — FUROSEMIDE 80 MG: 20 TABLET ORAL at 03:24

## 2025-05-30 ASSESSMENT — PAIN - FUNCTIONAL ASSESSMENT: PAIN_FUNCTIONAL_ASSESSMENT: NONE - DENIES PAIN

## 2025-05-30 NOTE — ED PROVIDER NOTES
Emergency Department Encounter    Patient: Jhonatan Cameron  MRN: [id number]  : 1955  Date of Evaluation: 2025  ED Provider:  Cristhian Austin DO    Triage Chief Complaint:   Leg Swelling    [place]:  Jhonatan Cameron is a 70 y.o. male that presents via EMS for bilateral lower extremity edema.  States he is not able to get into wound care clinic.  He did not take his Lasix today.  He tells me he has cellulitis.  This has been ongoing for last several days coronary tells me.  No chest pain no shortness of breath no fevers no nausea or vomiting.    MDM:    History from : Patient and EMS    On arrival patient was overall well-appearing.  He was resting comfortably when I went to go evaluate him.  He has significant bilateral lower extremity edema.  Appears to have chronic lymphedema/peripheral venous insufficiency.  I do not actually think he has cellulitis and this is likely related to his chronic edema.  He is not septic.  He does take 40 mg Lasix twice a day.  I did give him 80 mg Lasix tablet.  He is not in any respiratory distress.  He has been seen multiple times this month for lower extremity edema.    I reviewed his care management plan.  He is homeless and has been known to be malingering.  He has also been noted to have behavior disruptions when he is not admitted to the hospital.  At this time based on his examination I do not think he needs to be admitted.  Will place compressive dressings on both of his legs since he does not have compression stockings.  He has no signs of systemic illness.  No further diagnostics or interventions needed.  Stable for release.           Patient was given the following medications:  Medications   furosemide (LASIX) tablet 80 mg (has no administration in time range)       Discharge condition: stable    I am the Primary Clinician of Record.    Is this patient to be included in the SEP-1 Core Measure due to severe sepsis or septic shock?   No   Exclusion criteria - the

## 2025-05-31 ENCOUNTER — HOSPITAL ENCOUNTER (EMERGENCY)
Age: 70
Discharge: HOME | End: 2025-05-31
Attending: STUDENT IN AN ORGANIZED HEALTH CARE EDUCATION/TRAINING PROGRAM
Payer: MEDICARE

## 2025-05-31 VITALS
TEMPERATURE: 98.4 F | WEIGHT: 244 LBS | OXYGEN SATURATION: 96 % | RESPIRATION RATE: 18 BRPM | HEART RATE: 72 BPM | DIASTOLIC BLOOD PRESSURE: 60 MMHG | BODY MASS INDEX: 34.16 KG/M2 | HEIGHT: 71 IN | SYSTOLIC BLOOD PRESSURE: 129 MMHG

## 2025-05-31 DIAGNOSIS — I87.2 PERIPHERAL VENOUS INSUFFICIENCY: ICD-10-CM

## 2025-05-31 DIAGNOSIS — R60.0 BILATERAL LOWER EXTREMITY EDEMA: Primary | ICD-10-CM

## 2025-05-31 PROCEDURE — 99283 EMERGENCY DEPT VISIT LOW MDM: CPT

## 2025-05-31 RX ORDER — FUROSEMIDE 20 MG/1
40 TABLET ORAL ONCE
Status: DISCONTINUED | OUTPATIENT
Start: 2025-05-31 | End: 2025-05-31 | Stop reason: HOSPADM

## 2025-05-31 ASSESSMENT — PAIN SCALES - GENERAL
PAINLEVEL_OUTOF10: 8
PAINLEVEL_OUTOF10: 8

## 2025-05-31 ASSESSMENT — PAIN - FUNCTIONAL ASSESSMENT
PAIN_FUNCTIONAL_ASSESSMENT: 0-10
PAIN_FUNCTIONAL_ASSESSMENT: 0-10

## 2025-05-31 NOTE — ED PROVIDER NOTES
Emergency Department Encounter    Patient: Jhonatan Cameron  MRN: [id number]  : 1955  Date of Evaluation: 2025  ED Provider:  Cristhian Austin DO    Triage Chief Complaint:   Leg Swelling (Bilateral lower legs)    [place]:  Jhonatan Cameron is a 70 y.o. male that presents due to continued bilateral lower extremity edema and pain.  This been a recurrent issue.  He was just seen yesterday.  He reports he did not take his Lasix today and would like a dose of Lasix and have his legs rewrapped.  No chest pain no shortness of breath.    MDM:    History from : Patient and EMS    Patient presents with normal vital signs well-appearing.  I saw him last night for similar issues.  He has been seen multiple times for chronic lower extremity edema.  He has a management plan in place, there has been concerns for malingering previously.  He is homeless.  His evaluation today is unchanged from yesterday.  He has no signs of any systemic illness.  Will rewrap his legs given his Lasix he did not take it today and he tells me he has follow-up with the wound care clinic next week already.  No further diagnostics needed           Patient was given the following medications:  Medications   furosemide (LASIX) tablet 40 mg (has no administration in time range)       Discharge condition: stable    I am the Primary Clinician of Record.    Is this patient to be included in the SEP-1 Core Measure due to severe sepsis or septic shock?   No   Exclusion criteria - the patient is NOT to be included for SEP-1 Core Measure due to:  Infection is not suspected      ROS - see HPI, below listed is current ROS at time of my eval:  systems reviewed and negative except as above.     Past Medical History:   Diagnosis Date    Arthritis     Bipolar 1 disorder (HCC)     COPD (chronic obstructive pulmonary disease) (HCC)     \"use to have copd but is better\"    Hx of blood clots     \"had blood clot in lung and in my legs- that was in 2016- on Eliquis\"     Hyperlipidemia     Hypertension     Neuropathy     Schizo affective schizophrenia (HCC)     \"follow with Dr GERHARD Ramirez at [facility]    Tremors of nervous system     WD-Arterial insufficiency of lower extremity (HCC) 2/4/2021    WD-Cellulitis of left lower leg 2/4/2021    WD-Venous insufficiency of both lower extremities 2/4/2021    WDCellulitis of leg, right 2/4/2021     Past Surgical History:   Procedure Laterality Date    COLONOSCOPY  last one 2012    DENTAL SURGERY      \"wisdom teeth put to sleep- yrs ago\"    FRACTURE SURGERY  age 25    fx left fibia and tibia- hardware later removed    HYDROCELE EXCISION  10+ yrs ago    IR GUIDED IVC FILTER PLACEMENT N/A 07/14/2016    ALN filter lot#[id number], exp - Dr [facility]    JOINT REPLACEMENT  2012    L knee    UPPER GASTROINTESTINAL ENDOSCOPY  04/12/2018     Family History   Problem Relation Age of Onset    Cancer Mother         breast ca age 45, then later had someother form of cancer?unsure what it was    Heart Disease Father     Stroke Father     Cancer Sister     No Known Problems Brother      Social History     Socioeconomic History    Marital status: Single     Spouse name: Not on file    Number of children: Not on file    Years of education: Not on file    Highest education level: Not on file   Occupational History    Not on file   Tobacco Use    Smoking status: Never    Smokeless tobacco: Never   Vaping Use    Vaping status: Never Used   Substance and Sexual Activity    Alcohol use: No     Comment: stated has not had a drink in a year/\"quit drinking  30 yrs ago- use to drink on weekends- 4-5 beers\"    Drug use: Not Currently     Types: Marijuana (Weed)     Comment: \"last used over 30 yrs ago\"    Sexual activity: Not on file   Other Topics Concern    Not on file   Social History Narrative    Not on file     Social Drivers of Health     Financial Resource Strain: Not on file   Food Insecurity: No Food Insecurity (11/30/2024)    Hunger Vital Sign     Worried About

## 2025-06-03 ENCOUNTER — HOSPITAL ENCOUNTER (OUTPATIENT)
Dept: WOUND CARE | Age: 70
Discharge: HOME | End: 2025-06-03
Attending: NURSE PRACTITIONER
Payer: MEDICARE

## 2025-06-03 VITALS
HEART RATE: 84 BPM | TEMPERATURE: 98.5 F | DIASTOLIC BLOOD PRESSURE: 68 MMHG | RESPIRATION RATE: 18 BRPM | SYSTOLIC BLOOD PRESSURE: 126 MMHG

## 2025-06-03 DIAGNOSIS — L97.122 VENOUS STASIS ULCER OF LEFT THIGH WITH FAT LAYER EXPOSED WITH VARICOSE VEINS (HCC): Primary | ICD-10-CM

## 2025-06-03 DIAGNOSIS — E66.01 MORBID OBESITY DUE TO EXCESS CALORIES (HCC): ICD-10-CM

## 2025-06-03 DIAGNOSIS — L97.112 VENOUS STASIS ULCER OF RIGHT THIGH WITH FAT LAYER EXPOSED WITH VARICOSE VEINS (HCC): ICD-10-CM

## 2025-06-03 DIAGNOSIS — L60.2 LONG TOENAIL: ICD-10-CM

## 2025-06-03 DIAGNOSIS — I83.011 VENOUS STASIS ULCER OF RIGHT THIGH WITH FAT LAYER EXPOSED WITH VARICOSE VEINS (HCC): ICD-10-CM

## 2025-06-03 DIAGNOSIS — I87.2 VENOUS STASIS DERMATITIS OF BOTH LOWER EXTREMITIES: ICD-10-CM

## 2025-06-03 DIAGNOSIS — Z79.01 ANTICOAGULATED ON APIXABAN: ICD-10-CM

## 2025-06-03 DIAGNOSIS — I89.0 LYMPHEDEMA OF BOTH LOWER EXTREMITIES: ICD-10-CM

## 2025-06-03 DIAGNOSIS — I83.021 VENOUS STASIS ULCER OF LEFT THIGH WITH FAT LAYER EXPOSED WITH VARICOSE VEINS (HCC): Primary | ICD-10-CM

## 2025-06-03 PROCEDURE — 11719 TRIM NAIL(S) ANY NUMBER: CPT

## 2025-06-03 PROCEDURE — 99213 OFFICE O/P EST LOW 20 MIN: CPT

## 2025-06-03 PROCEDURE — 11042 DBRDMT SUBQ TIS 1ST 20SQCM/<: CPT

## 2025-06-03 NOTE — PATIENT INSTRUCTIONS
PHYSICIAN ORDERS AND DISCHARGE INSTRUCTIONS     Wound Care Notes:           Orders for this week:  6/3/2025       Bilateral lower leg wounds:Wash with soap and water. Pat dry.   Apply Anasept and Stimulen to wound bed  Cover with Zetuvit Plus  Wrap with Coban 2   Leave in place for 1 week or Nurse Visit Friday if needed      Dispense 30 day quantity when ordering supplies.  Plan:        Follow Up Instructions: 1 week   Primary Wound Care Provider: Jak GARCIA  Call  for any questions or concerns.  Central [phone] for imaging and lab work

## 2025-06-05 PROBLEM — L97.122 VENOUS STASIS ULCER OF LEFT THIGH WITH FAT LAYER EXPOSED WITH VARICOSE VEINS (HCC): Status: ACTIVE | Noted: 2025-06-05

## 2025-06-05 PROBLEM — I87.2 VENOUS STASIS DERMATITIS OF BOTH LOWER EXTREMITIES: Status: ACTIVE | Noted: 2025-06-05

## 2025-06-05 PROBLEM — I83.021 VENOUS STASIS ULCER OF LEFT THIGH WITH FAT LAYER EXPOSED WITH VARICOSE VEINS (HCC): Status: ACTIVE | Noted: 2025-06-05

## 2025-06-05 PROBLEM — L60.2 LONG TOENAIL: Status: ACTIVE | Noted: 2025-06-05

## 2025-06-05 PROBLEM — I83.011 VENOUS STASIS ULCER OF RIGHT THIGH WITH FAT LAYER EXPOSED WITH VARICOSE VEINS (HCC): Status: ACTIVE | Noted: 2025-06-05

## 2025-06-05 PROBLEM — L97.112 VENOUS STASIS ULCER OF RIGHT THIGH WITH FAT LAYER EXPOSED WITH VARICOSE VEINS (HCC): Status: ACTIVE | Noted: 2025-06-05

## 2025-06-05 PROBLEM — Z79.01 ANTICOAGULATED ON APIXABAN: Status: ACTIVE | Noted: 2025-06-05

## 2025-06-05 RX ORDER — TRIAMCINOLONE ACETONIDE 1 MG/G
OINTMENT TOPICAL PRN
OUTPATIENT
Start: 2025-06-05

## 2025-06-05 RX ORDER — MUPIROCIN 20 MG/G
OINTMENT TOPICAL PRN
OUTPATIENT
Start: 2025-06-05

## 2025-06-05 RX ORDER — LIDOCAINE HYDROCHLORIDE 20 MG/ML
JELLY TOPICAL PRN
OUTPATIENT
Start: 2025-06-05

## 2025-06-05 RX ORDER — GINSENG 100 MG
CAPSULE ORAL PRN
OUTPATIENT
Start: 2025-06-05

## 2025-06-05 RX ORDER — LIDOCAINE HYDROCHLORIDE 40 MG/ML
SOLUTION TOPICAL PRN
OUTPATIENT
Start: 2025-06-05

## 2025-06-05 RX ORDER — GENTAMICIN SULFATE 1 MG/G
OINTMENT TOPICAL PRN
OUTPATIENT
Start: 2025-06-05

## 2025-06-05 RX ORDER — SODIUM CHLOR/HYPOCHLOROUS ACID 0.033 %
SOLUTION, IRRIGATION IRRIGATION PRN
OUTPATIENT
Start: 2025-06-05

## 2025-06-05 RX ORDER — LIDOCAINE 40 MG/G
CREAM TOPICAL PRN
OUTPATIENT
Start: 2025-06-05

## 2025-06-05 RX ORDER — LIDOCAINE 50 MG/G
OINTMENT TOPICAL PRN
OUTPATIENT
Start: 2025-06-05

## 2025-06-05 RX ORDER — BETAMETHASONE DIPROPIONATE 0.5 MG/G
CREAM TOPICAL PRN
OUTPATIENT
Start: 2025-06-05

## 2025-06-05 RX ORDER — BACITRACIN ZINC AND POLYMYXIN B SULFATE 500; 1000 [USP'U]/G; [USP'U]/G
OINTMENT TOPICAL PRN
OUTPATIENT
Start: 2025-06-05

## 2025-06-05 RX ORDER — CLOBETASOL PROPIONATE 0.5 MG/G
OINTMENT TOPICAL PRN
OUTPATIENT
Start: 2025-06-05

## 2025-06-05 RX ORDER — SILVER SULFADIAZINE 10 MG/G
CREAM TOPICAL PRN
OUTPATIENT
Start: 2025-06-05

## 2025-06-05 RX ORDER — NEOMYCIN/BACITRACIN/POLYMYXINB 3.5-400-5K
OINTMENT (GRAM) TOPICAL PRN
OUTPATIENT
Start: 2025-06-05

## 2025-06-05 NOTE — PROGRESS NOTES
Wound Care Center Progress Visit      Jhonatan Cameron  AGE: 70 y.o.   GENDER: male  : 1955  EPISODE DATE:  6/3/2025   Referred by: Kaleb Ziegler APRN - CNP     Subjective:     CHIEF COMPLAINT  WOUND   Problem List Items Addressed This Visit          Circulatory    Venous stasis ulcer of left thigh with fat layer exposed with varicose veins (HCC) - Primary    Venous stasis ulcer of right thigh with fat layer exposed with varicose veins (HCC)    Venous stasis dermatitis of both lower extremities       Other    Morbid obesity due to excess calories (HCC)    WD-Lymphedema of both lower extremities    Anticoagulated on apixaban    Long toenail     Chief Complaint   Patient presents with    Wound Check        HISTORY of PRESENT ILLNESS      Jhonatan Cameron is a 70 y.o. male who presents to the Wound Clinic for evaluation and treatment of Acute venous and lymphedema  ulcer(s) of  R lower leg anterior, L lower leg anterior.  The condition is of moderate severity. The ulcer has been present for approximately 3 months at initial visit to the wound clinic.  The underlying cause is thought to be venous and lymphedema.  The patients care to date has included multiple visits to the ED for treatment of lymphedema and cellulitis with antibiotics including Keflex, Bactrim, rocephin, and cefazolin earlier in May. The patient has been homeless recently, but states he has a place to stay at this time.  Advanced wound care modalities established with initiation of care at the wound clinic.The patient has significant underlying medical conditions as below. PCP is, Kaleb Ziegler APRN - CNP .    Living Situation  [] Home [] SNF []  Assisted living  [x] Other (Motel/shelters) [] Home Health  []  Transportation    Wound Pain Timing/Severity: constant, waxing and waning, moderate  Quality of pain: aching, burning  Severity of pain:  8 / 10   Modifying Factors: edema, lymphedema, obesity, and anticoagulation

## 2025-06-10 ENCOUNTER — HOSPITAL ENCOUNTER (OUTPATIENT)
Dept: WOUND CARE | Age: 70
Discharge: HOME | End: 2025-06-10
Attending: NURSE PRACTITIONER
Payer: MEDICARE

## 2025-06-10 VITALS
RESPIRATION RATE: 18 BRPM | SYSTOLIC BLOOD PRESSURE: 136 MMHG | HEART RATE: 60 BPM | DIASTOLIC BLOOD PRESSURE: 66 MMHG | TEMPERATURE: 98.1 F

## 2025-06-10 DIAGNOSIS — L97.112 VENOUS STASIS ULCER OF RIGHT THIGH WITH FAT LAYER EXPOSED WITH VARICOSE VEINS (HCC): ICD-10-CM

## 2025-06-10 DIAGNOSIS — I83.011 VENOUS STASIS ULCER OF RIGHT THIGH WITH FAT LAYER EXPOSED WITH VARICOSE VEINS (HCC): ICD-10-CM

## 2025-06-10 DIAGNOSIS — I83.021 VENOUS STASIS ULCER OF LEFT THIGH WITH FAT LAYER EXPOSED WITH VARICOSE VEINS (HCC): Primary | ICD-10-CM

## 2025-06-10 DIAGNOSIS — L97.122 VENOUS STASIS ULCER OF LEFT THIGH WITH FAT LAYER EXPOSED WITH VARICOSE VEINS (HCC): Primary | ICD-10-CM

## 2025-06-10 PROCEDURE — 11042 DBRDMT SUBQ TIS 1ST 20SQCM/<: CPT

## 2025-06-10 RX ORDER — MUPIROCIN 2 %
OINTMENT (GRAM) TOPICAL PRN
OUTPATIENT
Start: 2025-06-10

## 2025-06-10 RX ORDER — LIDOCAINE 40 MG/G
CREAM TOPICAL PRN
OUTPATIENT
Start: 2025-06-10

## 2025-06-10 RX ORDER — GINSENG 100 MG
CAPSULE ORAL PRN
OUTPATIENT
Start: 2025-06-10

## 2025-06-10 RX ORDER — LIDOCAINE HYDROCHLORIDE 20 MG/ML
JELLY TOPICAL PRN
OUTPATIENT
Start: 2025-06-10

## 2025-06-10 RX ORDER — LIDOCAINE 50 MG/G
OINTMENT TOPICAL PRN
OUTPATIENT
Start: 2025-06-10

## 2025-06-10 RX ORDER — SODIUM CHLOR/HYPOCHLOROUS ACID 0.033 %
SOLUTION, IRRIGATION IRRIGATION PRN
OUTPATIENT
Start: 2025-06-10

## 2025-06-10 RX ORDER — BETAMETHASONE DIPROPIONATE 0.5 MG/G
CREAM TOPICAL PRN
OUTPATIENT
Start: 2025-06-10

## 2025-06-10 RX ORDER — NEOMYCIN/BACITRACIN/POLYMYXINB 3.5-400-5K
OINTMENT (GRAM) TOPICAL PRN
OUTPATIENT
Start: 2025-06-10

## 2025-06-10 RX ORDER — LIDOCAINE HYDROCHLORIDE 40 MG/ML
SOLUTION TOPICAL PRN
OUTPATIENT
Start: 2025-06-10

## 2025-06-10 RX ORDER — BACITRACIN ZINC AND POLYMYXIN B SULFATE 500; 1000 [USP'U]/G; [USP'U]/G
OINTMENT TOPICAL PRN
OUTPATIENT
Start: 2025-06-10

## 2025-06-10 RX ORDER — GENTAMICIN SULFATE 1 MG/G
OINTMENT TOPICAL PRN
OUTPATIENT
Start: 2025-06-10

## 2025-06-10 RX ORDER — CLOBETASOL PROPIONATE 0.5 MG/G
OINTMENT TOPICAL PRN
OUTPATIENT
Start: 2025-06-10

## 2025-06-10 RX ORDER — SILVER SULFADIAZINE 10 MG/G
CREAM TOPICAL PRN
OUTPATIENT
Start: 2025-06-10

## 2025-06-10 RX ORDER — TRIAMCINOLONE ACETONIDE 1 MG/G
OINTMENT TOPICAL PRN
OUTPATIENT
Start: 2025-06-10

## 2025-06-10 ASSESSMENT — PAIN SCALES - GENERAL: PAINLEVEL_OUTOF10: 0

## 2025-06-10 NOTE — PATIENT INSTRUCTIONS
PHYSICIAN ORDERS AND DISCHARGE INSTRUCTIONS     Wound Care Notes:           Orders for this week:  6/10/2025       Bilateral lower leg wounds:Wash with soap and water. Pat dry.   Apply Anasept and Stimulen to wound bed  Cover with Zetuvit Plus  Wrap with Coban 2   Leave in place for 1 week      Dispense 30 day quantity when ordering supplies.  Plan:        Follow Up Instructions: 1 week   Primary Wound Care Provider: Jak GARCIA  Call  for any questions or concerns.  Central [phone] for imaging and lab work

## 2025-06-10 NOTE — PROGRESS NOTES
Multilayer Compression Wrap   (Not Unna) Below the Knee    NAME:  Jhonatan Cameron  YOB: 1955  MEDICAL RECORD NUMBER:  [id number]  DATE:  6/10/2025    Multilayer compression wrap: Removed old Multilayer wrap if indicated and wash leg with mild soap/water.  Applied moisturizing agent to dry skin as needed.   Applied primary and secondary dressing as ordered.  Applied multilayered dressing below the knee to right lower leg.  Applied multilayered dressing below the knee to left lower leg.  Instructed patient/caregiver not to remove dressing and to keep it clean and dry.   Instructed patient/caregiver on complications to report to provider, such as pain, numbness in toes, heavy drainage, and slippage of dressing.  Instructed patient on purpose of compression dressing and on activity and exercise recommendations.      Electronically signed by Babatunde Lobato RN on 6/10/2025 at 3:49 PM  
pressure ulcers to the torso. Turning and changing positions frequently, at least every two hours. Use of pressure cushion if sitting up in chair.     Skin Care  Keep skin clean and well moisturized , moisturize routinely with ointments for heavier moisturizer needs for extremely dry skin or cracks such as A&D ointment and lotions for a light moisturizer such as CeraVe or Eucerin. If incontinent change incontinence garments as soon as soiled and keeping skin clean and use barrier cream to protect the skin.    Reduce Salt and Sodium  Choose low- or reduced- sodium, or no-salt-added versions of foods and condiments when available. Buy fresh, plain frozen, or canned with “no-added-salt” vegetables. Use fresh poultry, fish and lean meat, rather than canned, smoked or processed types. Choose ready-to-eat breakfast cereals that are lower in sodium. Limit cured foods (such as gee and ham), foods packed in brine (such as pickled foods) and condiments (such as MSG, mustard, horseradish, and catsup). Limit even lower sodium versions of soy sauce and teriyaki sauce-treat these condiments just like salt). In cooking and at the table, flavor foods with herbs, spices, lemon, lime, vinegar or salt-free seasoning blends. Start by cutting salt in half. Cook rice, pasta and hot cereals without salt. Cut back on instant or flavored rice, pasta and cereal mixes, which usually have added salt. Choose “convenience” foods that are lower in sodium. Limit frozen dinners, packaged mixes, canned soups and dressings. Rinse canned foods, such as tuna, to remove some sodium. Choose fruits or vegetables instead of salty snack foods.    Edema Management   Whenever resting, raise your legs up. Try to keep the swollen area higher than the level of your heart. Take breaks from standing or sitting in one position. Walk around to increase the blood flow in your lower legs. Move your feet and ankles often while you stand, or tighten and relax your leg

## 2025-06-15 ENCOUNTER — APPOINTMENT (OUTPATIENT)
Dept: CT IMAGING | Age: 70
DRG: 074 | End: 2025-06-15
Payer: MEDICARE

## 2025-06-15 ENCOUNTER — HOSPITAL ENCOUNTER (INPATIENT)
Age: 70
LOS: 2 days | Discharge: NURSING, REHAB OR LONG-TERM CARE | DRG: 074 | End: 2025-06-17
Attending: EMERGENCY MEDICINE | Admitting: STUDENT IN AN ORGANIZED HEALTH CARE EDUCATION/TRAINING PROGRAM
Payer: MEDICARE

## 2025-06-15 DIAGNOSIS — R27.0 ATAXIA: ICD-10-CM

## 2025-06-15 DIAGNOSIS — R42 DIZZINESS: Primary | ICD-10-CM

## 2025-06-15 LAB
ALBUMIN SERPL-MCNC: 3.9 G/DL (ref 3.4–5)
ALBUMIN/GLOB SERPL: 1.4 {RATIO} (ref 1.1–2.2)
ALP SERPL-CCNC: 109 U/L (ref 40–129)
ALT SERPL-CCNC: 18 U/L (ref 10–40)
ANION GAP SERPL CALCULATED.3IONS-SCNC: 10 MMOL/L (ref 9–17)
AST SERPL-CCNC: 19 U/L (ref 15–37)
BASOPHILS # BLD: 0.06 K/UL
BASOPHILS NFR BLD: 1 % (ref 0–1)
BILIRUB SERPL-MCNC: 0.3 MG/DL (ref 0–1)
BUN SERPL-MCNC: 21 MG/DL (ref 7–20)
CALCIUM SERPL-MCNC: 9.4 MG/DL (ref 8.3–10.6)
CHLORIDE SERPL-SCNC: 106 MMOL/L (ref 99–110)
CHP ED QC CHECK: YES
CO2 SERPL-SCNC: 27 MMOL/L (ref 21–32)
CREAT SERPL-MCNC: 1 MG/DL (ref 0.8–1.3)
EKG ATRIAL RATE: 49 BPM
EKG DIAGNOSIS: NORMAL
EKG P AXIS: 19 DEGREES
EKG P-R INTERVAL: 186 MS
EKG Q-T INTERVAL: 454 MS
EKG QRS DURATION: 116 MS
EKG QTC CALCULATION (BAZETT): 410 MS
EKG R AXIS: -21 DEGREES
EKG T AXIS: 43 DEGREES
EKG VENTRICULAR RATE: 49 BPM
EOSINOPHIL # BLD: 0.66 K/UL
EOSINOPHILS RELATIVE PERCENT: 9 % (ref 0–3)
ERYTHROCYTE [DISTWIDTH] IN BLOOD BY AUTOMATED COUNT: 14.9 % (ref 11.7–14.9)
GFR, ESTIMATED: 75 ML/MIN/1.73M2
GLUCOSE BLD-MCNC: 90 MG/DL
GLUCOSE BLD-MCNC: 90 MG/DL (ref 74–99)
GLUCOSE SERPL-MCNC: 103 MG/DL (ref 74–99)
HCT VFR BLD AUTO: 41.6 % (ref 42–52)
HGB BLD-MCNC: 13.2 G/DL (ref 13.5–18)
IMM GRANULOCYTES # BLD AUTO: 0.04 K/UL
IMM GRANULOCYTES NFR BLD: 1 %
INR PPP: 1
LYMPHOCYTES NFR BLD: 1.22 K/UL
LYMPHOCYTES RELATIVE PERCENT: 17 % (ref 24–44)
MCH RBC QN AUTO: 29.6 PG (ref 27–31)
MCHC RBC AUTO-ENTMCNC: 31.7 G/DL (ref 32–36)
MCV RBC AUTO: 93.3 FL (ref 78–100)
MONOCYTES NFR BLD: 0.5 K/UL
MONOCYTES NFR BLD: 7 % (ref 0–5)
NEUTROPHILS NFR BLD: 66 % (ref 36–66)
NEUTS SEG NFR BLD: 4.83 K/UL
PLATELET # BLD AUTO: 186 K/UL (ref 140–440)
PMV BLD AUTO: 9.8 FL (ref 7.5–11.1)
POTASSIUM SERPL-SCNC: 4.1 MMOL/L (ref 3.5–5.1)
PROT SERPL-MCNC: 6.6 G/DL (ref 6.4–8.2)
PROTHROMBIN TIME: 13.4 SEC (ref 11.7–14.5)
RBC # BLD AUTO: 4.46 M/UL (ref 4.6–6.2)
SODIUM SERPL-SCNC: 143 MMOL/L (ref 136–145)
TROPONIN I SERPL HS-MCNC: 17 NG/L (ref 0–22)
WBC OTHER # BLD: 7.3 K/UL (ref 4–10.5)

## 2025-06-15 PROCEDURE — 6370000000 HC RX 637 (ALT 250 FOR IP): Performed by: INTERNAL MEDICINE

## 2025-06-15 PROCEDURE — 2580000003 HC RX 258: Performed by: EMERGENCY MEDICINE

## 2025-06-15 PROCEDURE — 97116 GAIT TRAINING THERAPY: CPT

## 2025-06-15 PROCEDURE — 1200000000 HC SEMI PRIVATE

## 2025-06-15 PROCEDURE — 96360 HYDRATION IV INFUSION INIT: CPT

## 2025-06-15 PROCEDURE — 94761 N-INVAS EAR/PLS OXIMETRY MLT: CPT

## 2025-06-15 PROCEDURE — 99285 EMERGENCY DEPT VISIT HI MDM: CPT

## 2025-06-15 PROCEDURE — 6370000000 HC RX 637 (ALT 250 FOR IP): Performed by: EMERGENCY MEDICINE

## 2025-06-15 PROCEDURE — 85025 COMPLETE CBC W/AUTO DIFF WBC: CPT

## 2025-06-15 PROCEDURE — 70496 CT ANGIOGRAPHY HEAD: CPT

## 2025-06-15 PROCEDURE — 6360000004 HC RX CONTRAST MEDICATION: Performed by: EMERGENCY MEDICINE

## 2025-06-15 PROCEDURE — 97163 PT EVAL HIGH COMPLEX 45 MIN: CPT

## 2025-06-15 PROCEDURE — 97530 THERAPEUTIC ACTIVITIES: CPT

## 2025-06-15 PROCEDURE — 2500000003 HC RX 250 WO HCPCS: Performed by: INTERNAL MEDICINE

## 2025-06-15 PROCEDURE — 82962 GLUCOSE BLOOD TEST: CPT

## 2025-06-15 PROCEDURE — 70450 CT HEAD/BRAIN W/O DYE: CPT

## 2025-06-15 PROCEDURE — 80053 COMPREHEN METABOLIC PANEL: CPT

## 2025-06-15 PROCEDURE — 93010 ELECTROCARDIOGRAM REPORT: CPT | Performed by: INTERNAL MEDICINE

## 2025-06-15 PROCEDURE — 93005 ELECTROCARDIOGRAM TRACING: CPT | Performed by: EMERGENCY MEDICINE

## 2025-06-15 PROCEDURE — 85610 PROTHROMBIN TIME: CPT

## 2025-06-15 PROCEDURE — 6370000000 HC RX 637 (ALT 250 FOR IP): Performed by: NURSE PRACTITIONER

## 2025-06-15 PROCEDURE — 84484 ASSAY OF TROPONIN QUANT: CPT

## 2025-06-15 RX ORDER — ONDANSETRON 2 MG/ML
4 INJECTION INTRAMUSCULAR; INTRAVENOUS EVERY 6 HOURS PRN
Status: DISCONTINUED | OUTPATIENT
Start: 2025-06-15 | End: 2025-06-18 | Stop reason: HOSPADM

## 2025-06-15 RX ORDER — ONDANSETRON 4 MG/1
4 TABLET, ORALLY DISINTEGRATING ORAL EVERY 8 HOURS PRN
Status: DISCONTINUED | OUTPATIENT
Start: 2025-06-15 | End: 2025-06-18 | Stop reason: HOSPADM

## 2025-06-15 RX ORDER — ASPIRIN 81 MG/1
324 TABLET, CHEWABLE ORAL ONCE
Status: COMPLETED | OUTPATIENT
Start: 2025-06-15 | End: 2025-06-15

## 2025-06-15 RX ORDER — PRIMIDONE 50 MG/1
100 TABLET ORAL 2 TIMES DAILY
Status: DISCONTINUED | OUTPATIENT
Start: 2025-06-15 | End: 2025-06-16

## 2025-06-15 RX ORDER — ATORVASTATIN CALCIUM 40 MG/1
40 TABLET, FILM COATED ORAL NIGHTLY
Status: DISCONTINUED | OUTPATIENT
Start: 2025-06-15 | End: 2025-06-18 | Stop reason: HOSPADM

## 2025-06-15 RX ORDER — FUROSEMIDE 40 MG/1
40 TABLET ORAL 2 TIMES DAILY
Status: DISCONTINUED | OUTPATIENT
Start: 2025-06-15 | End: 2025-06-18 | Stop reason: HOSPADM

## 2025-06-15 RX ORDER — ASPIRIN 300 MG/1
300 SUPPOSITORY RECTAL DAILY
Status: DISCONTINUED | OUTPATIENT
Start: 2025-06-15 | End: 2025-06-16

## 2025-06-15 RX ORDER — SODIUM CHLORIDE 0.9 % (FLUSH) 0.9 %
5-40 SYRINGE (ML) INJECTION PRN
Status: DISCONTINUED | OUTPATIENT
Start: 2025-06-15 | End: 2025-06-18 | Stop reason: HOSPADM

## 2025-06-15 RX ORDER — MECLIZINE HYDROCHLORIDE 25 MG/1
25 TABLET ORAL ONCE
Status: COMPLETED | OUTPATIENT
Start: 2025-06-15 | End: 2025-06-15

## 2025-06-15 RX ORDER — GABAPENTIN 300 MG/1
300 CAPSULE ORAL 3 TIMES DAILY
Status: DISCONTINUED | OUTPATIENT
Start: 2025-06-15 | End: 2025-06-18 | Stop reason: HOSPADM

## 2025-06-15 RX ORDER — ASPIRIN 81 MG/1
81 TABLET, CHEWABLE ORAL DAILY
Status: DISCONTINUED | OUTPATIENT
Start: 2025-06-15 | End: 2025-06-16

## 2025-06-15 RX ORDER — SODIUM CHLORIDE 9 MG/ML
INJECTION, SOLUTION INTRAVENOUS PRN
Status: DISCONTINUED | OUTPATIENT
Start: 2025-06-15 | End: 2025-06-18 | Stop reason: HOSPADM

## 2025-06-15 RX ORDER — POTASSIUM CHLORIDE 750 MG/1
10 TABLET, EXTENDED RELEASE ORAL DAILY
Status: DISCONTINUED | OUTPATIENT
Start: 2025-06-15 | End: 2025-06-16

## 2025-06-15 RX ORDER — 0.9 % SODIUM CHLORIDE 0.9 %
500 INTRAVENOUS SOLUTION INTRAVENOUS ONCE
Status: COMPLETED | OUTPATIENT
Start: 2025-06-15 | End: 2025-06-15

## 2025-06-15 RX ORDER — SODIUM CHLORIDE 0.9 % (FLUSH) 0.9 %
5-40 SYRINGE (ML) INJECTION EVERY 12 HOURS SCHEDULED
Status: DISCONTINUED | OUTPATIENT
Start: 2025-06-15 | End: 2025-06-18 | Stop reason: HOSPADM

## 2025-06-15 RX ORDER — ACETAMINOPHEN 500 MG
1000 TABLET ORAL EVERY 6 HOURS PRN
Status: DISCONTINUED | OUTPATIENT
Start: 2025-06-15 | End: 2025-06-16

## 2025-06-15 RX ORDER — POLYETHYLENE GLYCOL 3350 17 G/17G
17 POWDER, FOR SOLUTION ORAL DAILY PRN
Status: DISCONTINUED | OUTPATIENT
Start: 2025-06-15 | End: 2025-06-18 | Stop reason: HOSPADM

## 2025-06-15 RX ORDER — LITHIUM CARBONATE 300 MG
600 TABLET ORAL 2 TIMES DAILY
Status: DISCONTINUED | OUTPATIENT
Start: 2025-06-15 | End: 2025-06-16

## 2025-06-15 RX ORDER — ENOXAPARIN SODIUM 100 MG/ML
40 INJECTION SUBCUTANEOUS DAILY
Status: DISCONTINUED | OUTPATIENT
Start: 2025-06-16 | End: 2025-06-15

## 2025-06-15 RX ORDER — DIVALPROEX SODIUM 500 MG/1
2000 TABLET, DELAYED RELEASE ORAL DAILY
Status: DISCONTINUED | OUTPATIENT
Start: 2025-06-15 | End: 2025-06-16

## 2025-06-15 RX ORDER — VITAMIN B COMPLEX
1000 TABLET ORAL DAILY
Status: DISCONTINUED | OUTPATIENT
Start: 2025-06-15 | End: 2025-06-18 | Stop reason: HOSPADM

## 2025-06-15 RX ORDER — GABAPENTIN 300 MG/1
300 CAPSULE ORAL 3 TIMES DAILY
COMMUNITY

## 2025-06-15 RX ORDER — IOPAMIDOL 755 MG/ML
75 INJECTION, SOLUTION INTRAVASCULAR
Status: COMPLETED | OUTPATIENT
Start: 2025-06-15 | End: 2025-06-15

## 2025-06-15 RX ADMIN — Medication 1000 UNITS: at 16:55

## 2025-06-15 RX ADMIN — MECLIZINE HYDROCHLORIDE 25 MG: 25 TABLET ORAL at 12:42

## 2025-06-15 RX ADMIN — GABAPENTIN 300 MG: 300 CAPSULE ORAL at 21:13

## 2025-06-15 RX ADMIN — APIXABAN 5 MG: 5 TABLET, FILM COATED ORAL at 20:01

## 2025-06-15 RX ADMIN — SODIUM CHLORIDE, PRESERVATIVE FREE 10 ML: 5 INJECTION INTRAVENOUS at 20:01

## 2025-06-15 RX ADMIN — ATORVASTATIN CALCIUM 40 MG: 40 TABLET, FILM COATED ORAL at 20:01

## 2025-06-15 RX ADMIN — ASPIRIN 81 MG: 81 TABLET, CHEWABLE ORAL at 16:55

## 2025-06-15 RX ADMIN — SODIUM CHLORIDE 500 ML: 0.9 INJECTION, SOLUTION INTRAVENOUS at 12:46

## 2025-06-15 RX ADMIN — POTASSIUM CHLORIDE 10 MEQ: 750 TABLET, EXTENDED RELEASE ORAL at 16:55

## 2025-06-15 RX ADMIN — IOPAMIDOL 75 ML: 755 INJECTION, SOLUTION INTRAVENOUS at 12:08

## 2025-06-15 RX ADMIN — FUROSEMIDE 40 MG: 40 TABLET ORAL at 16:55

## 2025-06-15 RX ADMIN — ASPIRIN 324 MG: 81 TABLET, CHEWABLE ORAL at 13:52

## 2025-06-15 ASSESSMENT — PAIN - FUNCTIONAL ASSESSMENT: PAIN_FUNCTIONAL_ASSESSMENT: NONE - DENIES PAIN

## 2025-06-15 ASSESSMENT — PAIN SCALES - GENERAL: PAINLEVEL_OUTOF10: 0

## 2025-06-15 NOTE — PROGRESS NOTES
4 Eyes Skin Assessment     NAME:  Jhonatan Cameron  YOB: 1955  MEDICAL RECORD NUMBER:  [id number]    The patient is being assessed for  Admission    I agree that at least one RN has performed a thorough Head to Toe Skin Assessment on the patient. ALL assessment sites listed below have been assessed.      Areas assessed by both nurses:    Head, Face, Ears, Shoulders, Back, Chest, Arms, Elbows, Hands, Sacrum. Buttock, Coccyx, Ischium, Legs. Feet and Heels, and Under Medical Devices         Does the Patient have a Wound? No noted wound(s)       Rich Prevention initiated by RN: No  Wound Care Orders initiated by RN: No    Pressure Injury (Stage 3,4, Unstageable, DTI, NWPT, and Complex wounds) if present, place Wound referral order by RN under : No    New Ostomies, if present place, Ostomy referral order under : No     Nurse 1 eSignature: Electronically signed by Anali Portillo RN on 6/15/25 at 6:12 PM EDT    **SHARE this note so that the co-signing nurse can place an eSignature**    Nurse 2 eSignature: Electronically signed by Janay Easton RN on 6/15/25 at 7:30 PM EDT

## 2025-06-15 NOTE — H&P
V2.0  History and Physical      Name:  Jhonatan Cameron /Age/Sex: 1955  (70 y.o. male)   MRN & CSN:  [id number] & [id number] Encounter Date/Time: 6/15/2025 2:55 PM EDT   [facility] PCP: Kaleb Ziegler APRN - CNP       Hospital Day: 1    Assessment and Plan:   Jhonatan Cameron is a 70 y.o. male     Dizziness and unsteady gait  - ED did not call a stroke alert as symptoms had resolved prior to physician's evaluation  - CT head reassuring  - CTA reassuring  - ED requested a posterior stroke evaluation  - Aspirin given in ED-continue-but keep in mind that he is on Eliquis  - Atorvastatin 40 mg daily started for now (not on a statin at home)  - Consider neurology consult    Bipolar disorder  - Patient reports that he does not need any psychiatric medications (Depakote, Risperdal, and lithium are listed on his chart), he reports that he used to have a behavioral health problem in the past, and states that he no longer has it  - The afternoon of admission nursing sent a message indicating that patient was talking to them about being a time traveler, this may be his baseline at this point  - Will consult psychiatry in the morning, although patient was cooperative this admission in significant contrast to previous admissions (management plan on chart review)    History of DVT and PE  - Status post IVC filter placed in IR in  (and upon CT abdomen review in  it was still present)  - On Eliquis-continue    Hypertension  - Mild hypertension noted in ED  - Not on medication-will monitor BP    History of lower extremity lymphedema  - On Lasix 40 mg twice daily-continue    Neuropathy  - On gabapentin 300 mg 3 times daily-continue    Apparent homelessness-he reports that he is staying in a hotel-case management to follow    Obesity Body mass index is 36.9 kg/m².        Disposition:   Current Living situation: Staying at a motel-[facility]  Expected Disposition PT/OT ordered  Estimated D/C: In 1

## 2025-06-15 NOTE — PROGRESS NOTES
Physical Therapy  Facility/Department: [facility] 3E  Physical Therapy Initial Assessment    Name: Jhonatan Cameron  : 1955  MRN: [id number]  Date of Service: 6/15/2025    Discharge Recommendations:      Facility for moderate post-acute rehabilitation, anticipate 1-2 hours per day and 5 days per week.           Patient Diagnosis(es): The primary encounter diagnosis was Dizziness. A diagnosis of Ataxia was also pertinent to this visit.  Past Medical History:  has a past medical history of Arthritis, Bipolar 1 disorder (HCC), COPD (chronic obstructive pulmonary disease) (HCC), Hx of blood clots, Hyperlipidemia, Hypertension, Neuropathy, Schizo affective schizophrenia (HCC), Tremors of nervous system, WD-Arterial insufficiency of lower extremity (HCC), WD-Cellulitis of left lower leg, WD-Venous insufficiency of both lower extremities, and WDCellulitis of leg, right.  Past Surgical History:  has a past surgical history that includes Hydrocele surgery (10+ yrs ago); IR GUIDED IVC FILTER PLACEMENT (N/A, 2016); joint replacement (); fracture surgery (age 25); Colonoscopy (last one ); Dental surgery; and Upper gastrointestinal endoscopy (2018).    Assessment  Body Structures, Functions, Activity Limitations Requiring Skilled Therapeutic Intervention: Decreased functional mobility ;Decreased ADL status;Decreased endurance;Decreased cognition;Decreased balance;Decreased high-level IADLs;Decreased strength;Decreased safe awareness  Therapy Prognosis: Good  Decision Making: High Complexity  Clinical Presentation: unpredictable characteristics  Requires PT Follow-Up: Yes  Activity Tolerance  Activity Tolerance: Patient tolerated evaluation without incident    Plan  Physical Therapy Plan  General Plan: 3-5 times per week  Current Treatment Recommendations: Strengthening, ROM, Balance training, Functional mobility training, Transfer training, ADL/Self-care training, IADL training, Cognitive/Perceptual  hospital room?: A Little  How much help is needed climbing 3-5 steps with a railing?: Total  AM-PAC Inpatient Mobility Raw Score : 16  AM-PAC Inpatient T-Scale Score : 40.78  Mobility Inpatient CMS 0-100% Score: 54.16  Mobility Inpatient CMS G-Code Modifier : CK           Goals  Long Term Goals  Time Frame for Long Term Goals : In one week:  Long Term Goal 1: Pt will complete all bed mobility with supervision  Long Term Goal 2: Pt will complete sit <> stand transfers with supervision  Long Term Goal 3: Pt will ambulate 500 feet with SBAx1 with LRAD  Long Term Goal 4: Pt will ascend/descend 6 steps with a handrail with minAx1  Long Term Goal 5: Pt will complete 3 sets of 10 reps of BLE AROM exercises       Education  Patient Education  Education Given To: Patient  Education Provided: Role of Therapy;Energy Conservation;Plan of Care;IADL Safety;ADL Adaptive Strategies;Transfer Training;Mobility Training;Fall Prevention Strategies;Equipment  Education Method: Demonstration;Verbal  Education Outcome: Verbalized understanding;Demonstrated understanding;Continued education needed      Time In: 1610  Time Out: 1650  Total Treatment Time: 40  Timed Code Treatment Minutes: 30            Callum Bueno PT, DPT  License #: [id number]

## 2025-06-15 NOTE — ED NOTES
ED TO INPATIENT SBAR HANDOFF    Patient Name: Jhonatan Cameron   :  1955  70 y.o.   Preferred Name    Family/Caregiver Present no   Restraints no   C-SSRS: Risk of Suicide: No Risk  Sitter no   Sepsis Risk Score Sepsis V2 Risk Score: 8.6    PLEASE NOTE--Encounter / Re-Admission Within 30 Days  This patient has had another encounter or admission within the last 30 days.      Readmission Risk Score: 13.5      Situation  Chief Complaint   Patient presents with    Dizziness     Dizziness since 530, hypertension     Brief Description of Patient's Condition: pt to the ED with dizziness since 530 from home with concerns of HTN as well. Patient has known gait issues due to neuropathy in his feet. Pt was taken straight to CT on arrival, is alert and oriented, and ambulatory. Passed swallow screen  Mental Status: oriented, alert, coherent, logical, thought processes intact, and able to concentrate and follow conversation  Arrived from: home    Imaging:   CTA HEAD NECK W CONTRAST   Final Result      CT HEAD WO CONTRAST   Final Result        Abnormal labs:   Abnormal Labs Reviewed   CBC WITH AUTO DIFFERENTIAL - Abnormal; Notable for the following components:       Result Value    RBC 4.46 (*)     Hemoglobin 13.2 (*)     Hematocrit 41.6 (*)     MCHC 31.7 (*)     Lymphocytes % 17 (*)     Monocytes % 7 (*)     Eosinophils % 9 (*)     Immature Granulocytes % 1 (*)     All other components within normal limits   COMPREHENSIVE METABOLIC PANEL W/ REFLEX TO MG FOR LOW K - Abnormal; Notable for the following components:    Glucose 103 (*)     BUN 21 (*)     All other components within normal limits        Background  History:   Past Medical History:   Diagnosis Date    Arthritis     Bipolar 1 disorder (HCC)     COPD (chronic obstructive pulmonary disease) (HCC)     \"use to have copd but is better\"    Hx of blood clots     \"had blood clot in lung and in my legs- that was in 2016- on Eliquis\"    Hyperlipidemia     Hypertension

## 2025-06-16 ENCOUNTER — APPOINTMENT (OUTPATIENT)
Dept: NON INVASIVE DIAGNOSTICS | Age: 70
DRG: 074 | End: 2025-06-16
Attending: INTERNAL MEDICINE
Payer: MEDICARE

## 2025-06-16 ENCOUNTER — APPOINTMENT (OUTPATIENT)
Dept: MRI IMAGING | Age: 70
DRG: 074 | End: 2025-06-16
Payer: MEDICARE

## 2025-06-16 LAB
ANION GAP SERPL CALCULATED.3IONS-SCNC: 10 MMOL/L (ref 9–17)
BUN SERPL-MCNC: 22 MG/DL (ref 7–20)
CALCIUM SERPL-MCNC: 8.7 MG/DL (ref 8.3–10.6)
CHLORIDE SERPL-SCNC: 102 MMOL/L (ref 99–110)
CHOLEST SERPL-MCNC: 151 MG/DL (ref 125–199)
CO2 SERPL-SCNC: 26 MMOL/L (ref 21–32)
CREAT SERPL-MCNC: 1 MG/DL (ref 0.8–1.3)
DATE LAST DOSE: ABNORMAL
ECHO AO ROOT DIAM: 3.6 CM
ECHO AO ROOT INDEX: 1.51 CM/M2
ECHO AV AREA PEAK VELOCITY: 3.5 CM2
ECHO AV AREA VTI: 3.6 CM2
ECHO AV AREA/BSA PEAK VELOCITY: 1.5 CM2/M2
ECHO AV AREA/BSA VTI: 1.5 CM2/M2
ECHO AV MEAN GRADIENT: 4 MMHG
ECHO AV MEAN VELOCITY: 0.9 M/S
ECHO AV PEAK GRADIENT: 6 MMHG
ECHO AV PEAK VELOCITY: 1.3 M/S
ECHO AV VELOCITY RATIO: 0.92
ECHO AV VTI: 27.7 CM
ECHO EST RA PRESSURE: 3 MMHG
ECHO IVC PROX: 1.9 CM
ECHO LA AREA 4C: 17.8 CM2
ECHO LA DIAMETER INDEX: 1.51 CM/M2
ECHO LA DIAMETER: 3.6 CM
ECHO LA MAJOR AXIS: 7.9 CM
ECHO LA TO AORTIC ROOT RATIO: 1
ECHO LA VOL MOD A4C: 33 ML (ref 18–58)
ECHO LA VOLUME INDEX MOD A4C: 14 ML/M2 (ref 16–34)
ECHO LV E' LATERAL VELOCITY: 13.6 CM/S
ECHO LV E' SEPTAL VELOCITY: 6.7 CM/S
ECHO LV EDV A4C: 152 ML
ECHO LV EDV INDEX A4C: 64 ML/M2
ECHO LV EF PHYSICIAN: 55 %
ECHO LV EJECTION FRACTION A4C: 63 %
ECHO LV ESV A4C: 56 ML
ECHO LV ESV INDEX A4C: 24 ML/M2
ECHO LV FRACTIONAL SHORTENING: 32 % (ref 28–44)
ECHO LV INTERNAL DIMENSION DIASTOLE INDEX: 2.48 CM/M2
ECHO LV INTERNAL DIMENSION DIASTOLIC: 5.9 CM (ref 4.2–5.9)
ECHO LV INTERNAL DIMENSION SYSTOLIC INDEX: 1.68 CM/M2
ECHO LV INTERNAL DIMENSION SYSTOLIC: 4 CM
ECHO LV IVSD: 1.2 CM (ref 0.6–1)
ECHO LV MASS 2D: 255.7 G (ref 88–224)
ECHO LV MASS INDEX 2D: 107.4 G/M2 (ref 49–115)
ECHO LV POSTERIOR WALL DIASTOLIC: 0.9 CM (ref 0.6–1)
ECHO LV RELATIVE WALL THICKNESS RATIO: 0.31
ECHO LVOT AREA: 3.8 CM2
ECHO LVOT AV VTI INDEX: 0.95
ECHO LVOT DIAM: 2.2 CM
ECHO LVOT MEAN GRADIENT: 3 MMHG
ECHO LVOT PEAK GRADIENT: 5 MMHG
ECHO LVOT PEAK VELOCITY: 1.2 M/S
ECHO LVOT STROKE VOLUME INDEX: 42.1 ML/M2
ECHO LVOT SV: 100.3 ML
ECHO LVOT VTI: 26.4 CM
ECHO MV A VELOCITY: 0.76 M/S
ECHO MV E VELOCITY: 0.84 M/S
ECHO MV E/A RATIO: 1.11
ECHO MV E/E' LATERAL: 6.18
ECHO MV E/E' RATIO (AVERAGED): 9.36
ECHO MV E/E' SEPTAL: 12.54
ECHO RIGHT VENTRICULAR SYSTOLIC PRESSURE (RVSP): 20 MMHG
ECHO RV MID DIMENSION: 4.5 CM
ECHO TV REGURGITANT MAX VELOCITY: 2.09 M/S
ECHO TV REGURGITANT PEAK GRADIENT: 17 MMHG
ERYTHROCYTE [DISTWIDTH] IN BLOOD BY AUTOMATED COUNT: 15 % (ref 11.7–14.9)
EST. AVERAGE GLUCOSE BLD GHB EST-MCNC: 110 MG/DL
GFR, ESTIMATED: 77 ML/MIN/1.73M2
GLUCOSE SERPL-MCNC: 139 MG/DL (ref 74–99)
HBA1C MFR BLD: 5.5 % (ref 4.2–6.3)
HCT VFR BLD AUTO: 40.3 % (ref 42–52)
HDLC SERPL-MCNC: 67 MG/DL
HGB BLD-MCNC: 12.9 G/DL (ref 13.5–18)
LDLC SERPL CALC-MCNC: 47 MG/DL
LITHIUM DATE LAST DOSE: ABNORMAL
LITHIUM DOSE AMOUNT: ABNORMAL
LITHIUM DOSE TIME: ABNORMAL
LITHIUM LEVEL: <0.1 MMOL/L (ref 0.6–1.2)
MCH RBC QN AUTO: 30.1 PG (ref 27–31)
MCHC RBC AUTO-ENTMCNC: 32 G/DL (ref 32–36)
MCV RBC AUTO: 94.2 FL (ref 78–100)
PLATELET # BLD AUTO: 197 K/UL (ref 140–440)
PMV BLD AUTO: 10 FL (ref 7.5–11.1)
POTASSIUM SERPL-SCNC: 3.9 MMOL/L (ref 3.5–5.1)
RBC # BLD AUTO: 4.28 M/UL (ref 4.6–6.2)
SODIUM SERPL-SCNC: 139 MMOL/L (ref 136–145)
TME LAST DOSE: ABNORMAL H
TRIGL SERPL-MCNC: 186 MG/DL
VALPROATE SERPL-MCNC: <3 UG/ML (ref 50–100)
VANCOMYCIN DOSE: ABNORMAL MG
WBC OTHER # BLD: 7.9 K/UL (ref 4–10.5)

## 2025-06-16 PROCEDURE — 80178 ASSAY OF LITHIUM: CPT

## 2025-06-16 PROCEDURE — 6370000000 HC RX 637 (ALT 250 FOR IP): Performed by: NURSE PRACTITIONER

## 2025-06-16 PROCEDURE — 94761 N-INVAS EAR/PLS OXIMETRY MLT: CPT

## 2025-06-16 PROCEDURE — 80164 ASSAY DIPROPYLACETIC ACD TOT: CPT

## 2025-06-16 PROCEDURE — 83036 HEMOGLOBIN GLYCOSYLATED A1C: CPT

## 2025-06-16 PROCEDURE — 93306 TTE W/DOPPLER COMPLETE: CPT

## 2025-06-16 PROCEDURE — 80048 BASIC METABOLIC PNL TOTAL CA: CPT

## 2025-06-16 PROCEDURE — 97535 SELF CARE MNGMENT TRAINING: CPT

## 2025-06-16 PROCEDURE — 99223 1ST HOSP IP/OBS HIGH 75: CPT | Performed by: NURSE PRACTITIONER

## 2025-06-16 PROCEDURE — 6370000000 HC RX 637 (ALT 250 FOR IP): Performed by: INTERNAL MEDICINE

## 2025-06-16 PROCEDURE — 92610 EVALUATE SWALLOWING FUNCTION: CPT

## 2025-06-16 PROCEDURE — 36415 COLL VENOUS BLD VENIPUNCTURE: CPT

## 2025-06-16 PROCEDURE — 80061 LIPID PANEL: CPT

## 2025-06-16 PROCEDURE — 85027 COMPLETE CBC AUTOMATED: CPT

## 2025-06-16 PROCEDURE — 2500000003 HC RX 250 WO HCPCS: Performed by: INTERNAL MEDICINE

## 2025-06-16 PROCEDURE — 1200000000 HC SEMI PRIVATE

## 2025-06-16 PROCEDURE — 70551 MRI BRAIN STEM W/O DYE: CPT

## 2025-06-16 PROCEDURE — 93306 TTE W/DOPPLER COMPLETE: CPT | Performed by: INTERNAL MEDICINE

## 2025-06-16 PROCEDURE — 97530 THERAPEUTIC ACTIVITIES: CPT

## 2025-06-16 PROCEDURE — 97166 OT EVAL MOD COMPLEX 45 MIN: CPT

## 2025-06-16 RX ORDER — POTASSIUM CHLORIDE 750 MG/1
10 TABLET, EXTENDED RELEASE ORAL 2 TIMES DAILY
Status: DISCONTINUED | OUTPATIENT
Start: 2025-06-16 | End: 2025-06-18 | Stop reason: HOSPADM

## 2025-06-16 RX ORDER — ARIPIPRAZOLE 20 MG/1
30 TABLET ORAL DAILY
COMMUNITY

## 2025-06-16 RX ORDER — IBUPROFEN 400 MG/1
400 TABLET, FILM COATED ORAL EVERY 6 HOURS PRN
Status: DISCONTINUED | OUTPATIENT
Start: 2025-06-16 | End: 2025-06-18 | Stop reason: HOSPADM

## 2025-06-16 RX ORDER — DIVALPROEX SODIUM 500 MG/1
500 TABLET, FILM COATED, EXTENDED RELEASE ORAL 2 TIMES DAILY
Status: DISCONTINUED | OUTPATIENT
Start: 2025-06-16 | End: 2025-06-16

## 2025-06-16 RX ORDER — ARIPIPRAZOLE 10 MG/1
30 TABLET ORAL NIGHTLY
Status: DISCONTINUED | OUTPATIENT
Start: 2025-06-16 | End: 2025-06-18 | Stop reason: HOSPADM

## 2025-06-16 RX ADMIN — ATORVASTATIN CALCIUM 40 MG: 40 TABLET, FILM COATED ORAL at 21:06

## 2025-06-16 RX ADMIN — FUROSEMIDE 40 MG: 40 TABLET ORAL at 10:52

## 2025-06-16 RX ADMIN — IBUPROFEN 400 MG: 400 TABLET, FILM COATED ORAL at 06:03

## 2025-06-16 RX ADMIN — SODIUM CHLORIDE, PRESERVATIVE FREE 10 ML: 5 INJECTION INTRAVENOUS at 21:07

## 2025-06-16 RX ADMIN — GABAPENTIN 300 MG: 300 CAPSULE ORAL at 13:42

## 2025-06-16 RX ADMIN — POTASSIUM CHLORIDE 10 MEQ: 750 TABLET, EXTENDED RELEASE ORAL at 10:52

## 2025-06-16 RX ADMIN — APIXABAN 5 MG: 5 TABLET, FILM COATED ORAL at 10:52

## 2025-06-16 RX ADMIN — Medication 1000 UNITS: at 06:03

## 2025-06-16 RX ADMIN — POTASSIUM CHLORIDE 10 MEQ: 750 TABLET, EXTENDED RELEASE ORAL at 17:17

## 2025-06-16 RX ADMIN — ARIPIPRAZOLE 30 MG: 10 TABLET ORAL at 21:06

## 2025-06-16 RX ADMIN — ASPIRIN 81 MG: 81 TABLET, CHEWABLE ORAL at 10:52

## 2025-06-16 RX ADMIN — IBUPROFEN 400 MG: 400 TABLET, FILM COATED ORAL at 21:13

## 2025-06-16 RX ADMIN — APIXABAN 5 MG: 5 TABLET, FILM COATED ORAL at 21:06

## 2025-06-16 RX ADMIN — SODIUM CHLORIDE, PRESERVATIVE FREE 10 ML: 5 INJECTION INTRAVENOUS at 10:52

## 2025-06-16 RX ADMIN — FUROSEMIDE 40 MG: 40 TABLET ORAL at 17:17

## 2025-06-16 RX ADMIN — GABAPENTIN 300 MG: 300 CAPSULE ORAL at 21:06

## 2025-06-16 RX ADMIN — GABAPENTIN 300 MG: 300 CAPSULE ORAL at 10:52

## 2025-06-16 ASSESSMENT — PAIN SCALES - GENERAL
PAINLEVEL_OUTOF10: 2
PAINLEVEL_OUTOF10: 0
PAINLEVEL_OUTOF10: 0

## 2025-06-16 NOTE — CARE COORDINATION
IBANW informed that pt is very confused.  IBANW spoke with pt POA/sister Emily.  Emily stated she has spoken with Karmen with [facility] and she would like pt to go to their locked unit.  IBANW called Karmen with  and gave referral.  IBANW also sent referral thru [facility].

## 2025-06-16 NOTE — CARE COORDINATION
LSW was informed in IDR that psych is now recommending inpt MH.  LSw will need the ADT20 order placed in the chart along with a statement that pt is medically ready to be discharged.

## 2025-06-16 NOTE — PROGRESS NOTES
V2.0  [facility] [place]      Name:  Jhonatan Cameron /Age/Sex: 1955  (70 y.o. male)   MRN & CSN:  [id number] & [id number] Encounter Date/Time: 2025 7:56 AM EDT    Location:  [address] [place] [address]-A PCP: Kaleb Ziegler APRN - CNP       Hospital Day: 2    Assessment and Plan:   Jhonatan Cameron is a 70 y.o. male who presents with Ataxia      Plan:    Dizziness and unsteady gait - resolved   - ED did not call a stroke alert as symptoms had resolved prior to physician's evaluation  - CT head reassuring  - CTA reassuring  - ED requested a posterior stroke evaluation  - Aspirin given in ED, will DC ASA as he does not have a stroke, cont Lipitor 40 mg     -MRI Brain: no acute abnormalities or CVA, chronic microvascular ischemic changes      Bipolar disorder - having Manic Episode   Schizo affective Schizophrenia   - Pharmacy informed me that patient he is not taking lithium or Risperdal  - The afternoon of admission nursing sent a message indicating that patient was talking to them about being a time traveler  - Psych Consult: EMPERATRIZ Escobedo, recommends Sitter and Inpatient Psych, as he is Manic and unable to make rationale decisions for himself, took himself off all his Psych meds  -per Psych: cont Risperdal 3 mg po qhs and Lithium 600 mg po bid restarted on admission, and adding Depakote  mg po bid     Hx of DVT and PE   -cont Eliquis      Hypertension  - Mild hypertension noted in ED    Hyperlipidemia   -started on Lipitor 40 mg      History of lower extremity lymphedema     Polyneuropathy  -cont gabapentin    COPD     Chronic Venous Insufficiency   -cont Lasix and potassium       Diet ADULT DIET; Regular   DVT Prophylaxis [] Lovenox, []  Heparin, [] SCDs, [] Ambulation,  [x] Eliquis, [] Xarelto  [] Coumadin   Code Status Full Code   Disposition From: [facility]   Expected Disposition: Inpatient Mental Health   Estimated Date of Discharge: 1-2 days   Patient requires continued admission

## 2025-06-16 NOTE — PROGRESS NOTES
Facility/Department: [facility] 3E   CLINICAL BEDSIDE SWALLOW EVALUATION    NAME: Jhonatan Cameron  : 1955  MRN: [id number]    ADMISSION DATE: 6/15/2025  ADMITTING DIAGNOSIS: has SOB (shortness of breath) on exertion; Acute pulmonary embolism (HCC); Gait disturbance; Dizzy; Morbid obesity (HCC); Bipolar disorder (HCC); History of DVT of lower extremity; General weakness; Abnormal EKG; Exertional dyspnea; Sinus bradycardia; Pulmonary embolism without acute cor pulmonale (HCC); Morbid obesity due to excess calories (HCC); CIDP (chronic inflammatory demyelinating polyneuropathy) ([facility]); Bipolar disorder, current episode mixed, moderate (HCC); WD-Cellulitis of left lower leg; WDCellulitis of leg, right; WD-Arterial insufficiency of lower extremity (HCC); WD-Venous insufficiency of both lower extremities; WD-Lymphedema of both lower extremities; Debility; Bipolar I disorder with shy (HCC); Difficulty walking; Bipolar disorder with severe shy (HCC); Dysarthria; Stroke-like symptoms; Venous stasis ulcer of left thigh with fat layer exposed with varicose veins (HCC); Venous stasis ulcer of right thigh with fat layer exposed with varicose veins (HCC); Venous stasis dermatitis of both lower extremities; Anticoagulated on apixaban; Long toenail; and Ataxia on their problem list.      IMPRESSIONS: Jhonatan Cameron was seen for a bedside swallow evaluation following admission to [facility] for dizziness and unsteady gait. Pt denies a history of dysphagia and reports a baseline diet of regular/thin liquids. Pt has been on regular/thin for the duration of admission and reports no difficulties. Pt did have an OP MBS in 2018 d/t reported globus sensation with solids. MBS showed no aspiration but did reveal some backflow into upper esophagus and slow motility. GI consult was recommended but pt did not pursue a GI appt. Pertinent medical history includes bipolar 1 disorder, COPD, schizo affective schizophrenia, HTN.    Pt was

## 2025-06-16 NOTE — PROGRESS NOTES
06/16/25 1536   Encounter Summary   Encounter Overview/Reason Advance Care Planning   Encounter Code  Assessment by  services   Service Provided For Patient   Referral/Consult From Nurse;Patient   Support System Family members   Last Encounter  06/16/25  (After assessment, I can't do Advance Directives for this Pt due to his mental illness, charge nurse confirmed that, we also don't do DNR's, this Pt should not have had a consult for AD due to his mental condition.)   Complexity of Encounter Moderate   Begin Time 1501   End Time  1541   Total Time Calculated 40 min   Spiritual/Emotional needs   Type Spiritual Support   Advance Care Planning   Type ACP conversation  (Dr. Donahue -\"Full Code Pt reports he has a DNR, but I cannot find it scanned in, spiritual services consulted, full code at this time pending psychiatry evaluation\" We don't do DNR's, we shouldn't have been given consult for AD due to his mental illness)   Assessment/Intervention/Outcome   Assessment Calm;Coping;Impaired social interaction  (Dr. Donahue \"Full Code patient reports he has a DNR, (but I cannot find it scanned in), spiritual services consulted, full code at this time pending psychiatry evaluation\" We don't do DNR's and we can't do the Advance Directives due to Pt's mental illness)   Intervention Active listening;Discussed illness injury and it’s impact;Sustaining Presence/Ministry of presence;Discussed belief system/Lutheran practices/zoë   Outcome Coping;Engaged in conversation   Plan and Referrals   Plan/Referrals No future visits requested
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
## 2025-06-16 NOTE — CONSULTS
[facility] ACUTE CARE OCCUPATIONAL THERAPY EVALUATION  Jhonatan Cameron, 1955, 3018/[id number]-A, 6/16/2025    Discharge Recommendation: Facility for moderate post-acute rehabilitation, anticipate 1-2 hours per day and 5 days per week.    History  [place]:  The primary encounter diagnosis was Dizziness. A diagnosis of Ataxia was also pertinent to this visit.  Patient  has a past medical history of Arthritis, Bipolar 1 disorder (HCC), COPD (chronic obstructive pulmonary disease) ([facility]), Hx of blood clots, Hyperlipidemia, Hypertension, Neuropathy, Schizo affective schizophrenia ([facility]), Tremors of nervous system, WD-Arterial insufficiency of lower extremity (HCC), WD-Cellulitis of left lower leg, WD-Venous insufficiency of both lower extremities, and WDCellulitis of leg, right.  Patient  has a past surgical history that includes Hydrocele surgery (10+ yrs ago); IR GUIDED IVC FILTER PLACEMENT (N/A, 07/14/2016); joint replacement (2012); fracture surgery (age 25); Colonoscopy (last one 2012); Dental surgery; and Upper gastrointestinal endoscopy (04/12/2018).    Subjective:  Patient states:  \"I've been at almost all the psych facilities around here running quality reviews\"   Pain:  reports R knee pain, did not rate numerically.    Communication: RN cleared pt for therapy and notified about finding pocket knife in pts bed (removed and placed in closet), CM   Restrictions: general precautions, fall risk    Home Setup/Prior level of function  Social/Functional History  Lives With: Alone  Type of Home:  (\"I've been living at the [facility]\")  Home Layout: One level  Bathroom Shower/Tub: Tub/Shower unit  Bathroom Equipment: Grab bars in shower  Home Equipment: None  Has the patient had two or more falls in the past year or any fall with injury in the past year?: Yes  Prior Level of Assist for ADLs: Independent  Prior Level of Assist for Ambulation: Independent household ambulator, with or without device ((no DME  notified, gait belt used.     Assessment:  Pt is a 69 y/o male admitted for The primary encounter diagnosis was Dizziness. A diagnosis of Ataxia was also pertinent to this visit.. Pt at baseline is independent with ADLs and independent with functional transfers/mobility using no DME. Pt currently presents w/ deficits relating to ADLs, IADLs, UE strength/ROM, functional activity tolerance, cognition, safety awareness, and functional mobility. Pt would benefit from continued acute care OT services     Complexity: Moderate   Prognosis: Good, no significant barriers to participation at this time.   Occupational Therapy Plan  Times Per Week: 2+       Goals:  - Pt will perform standing ADLs with Mod I  using AE PRN by d/c  - Pt will perform LE ADLs with Mod I  using AE PRN by d/c  - Pt will perform toileting with Mod I using AE PRN by d/c  - Pt will perform HH distance functional mobility with Mod I  using DME PRN in preparation for return to home   - Pt will perform functional transfers to/from bed, chair, and toilet with Mod I using AE PRN by d/c    Treatment plan:  Pt will improve functional activity tolerance, strength, functional mobility, and ADL completion    Recommendations for NURSING activity: Up to chair for all 3 meals and up to toilet for all toileting needs    Time:   Time in: 1305  Time out: 1339  Timed treatment minutes: 23  Total time: 34    Electronically signed by:    PREET Ruggiero/L [id number]  6/16/2025, 1:46 PM

## 2025-06-16 NOTE — PROGRESS NOTES
Xenia from [facility]- [phone]. She called to update the nurse on home medications. They video monitor the patient taking medications Mon-Fri at home. Medication list updated. She states that the patient hasn't been taking depakote or lithium. He has been taking abilify oral. He refused his abilify injection, but has been agreeable to the oral.

## 2025-06-16 NOTE — CONSULTS
Initial Psychiatric History and Physical    Edward NIKOLAS Cameron  [id number]  6/15/2025  06/16/25    ID: Patient is a 70 yrs y.o. male    CC:MH assessment    HPI: Patient is a 70 year old male with pmhx of Bipolar one, COPD, HTN, who presents to [facility] ED with concerns of dizziness and is currently noting he no longer has a \"behavioral health problem in the past.\".He was also talking elaborately about traveling back in time, and it was difficult to bring him back to the medical interview,  but he was very pleasant about it and was cooperative   Psychiatry consulted by Dr Donahue due to \"Patient is talking about being a time traveler and other bizarre things\"        Met with patient at bedside. Patient is alert and oriented x 3. Patient came in for dizziness that he says has resolved and it was due to \"vertigo.\" Patient notes he is linked with Dr Holt and he feels a good connection. Patient quite delusional with pressured speech. He is not taking his medications nor eating much. Observed later in the day with PT and walking extremely fast. Patient was kind and calm. Difficult to get to slow down and needed interrupted often. Patient noting when asked the last time he was psychiatrically admitted, \"Jessy been inspecting psychiatric units undercover. They are concentration camps disguised as psychiatric units. He commented that Dr Bueno is a \"bot\" and killed 100's of million people and is with the [place] [facility].  [place] is the center of the universe and the world siphons down to [place].\" He is staying currently at [facility]. He is upset his sister Emily is not paying for it as \"she has millions.\" He comments his father is [facility]. He went off on other tangents with flight of ideas and circumstantial speech. He also denies that he has a Mental illness and while he did for 3 years, he no longer does. Patient is grandiose at this time. Insight and judgment impaired.      Psychiatry left a message for    Result Value Ref Range    Troponin, High Sensitivity 17 0 - 22 ng/L   Protime-INR    Collection Time: 06/15/25 11:54 AM   Result Value Ref Range    Protime 13.4 11.7 - 14.5 sec    INR 1.0    EKG 12 Lead - Recommend after Head CT performed    Collection Time: 06/15/25 12:41 PM   Result Value Ref Range    Ventricular Rate 49 BPM    Atrial Rate 49 BPM    P-R Interval 186 ms    QRS Duration 116 ms    Q-T Interval 454 ms    QTc Calculation (Bazett) 410 ms    P Axis 19 degrees    R Axis -21 degrees    T Axis 43 degrees    Diagnosis       Sinus bradycardia  When compared with ECG of 27-JAN-2025 18:07,  QRS duration has increased  Nonspecific T wave abnormality no longer evident in Lateral leads  Confirmed by MCKAYLA BEAVER ([id number]) on 6/15/2025 3:28:11 PM     Hemoglobin A1c    Collection Time: 06/16/25  2:33 AM   Result Value Ref Range    Hemoglobin A1C 5.5 4.2 - 6.3 %    Estimated Avg Glucose 110 mg/dL   Basic Metabolic Panel w/ Reflex to MG    Collection Time: 06/16/25  2:34 AM   Result Value Ref Range    Sodium 139 136 - 145 mmol/L    Potassium 3.9 3.5 - 5.1 mmol/L    Chloride 102 99 - 110 mmol/L    CO2 26 21 - 32 mmol/L    Anion Gap 10 9 - 17 mmol/L    Glucose 139 (H) 74 - 99 mg/dL    BUN 22 (H) 7 - 20 mg/dL    Creatinine 1.0 0.8 - 1.3 mg/dL    Est, Glom Filt Rate 77 >60 mL/min/1.73m2    Calcium 8.7 8.3 - 10.6 mg/dL   CBC    Collection Time: 06/16/25  2:34 AM   Result Value Ref Range    WBC 7.9 4.0 - 10.5 k/uL    RBC 4.28 (L) 4.60 - 6.20 m/uL    Hemoglobin 12.9 (L) 13.5 - 18.0 g/dL    Hematocrit 40.3 (L) 42.0 - 52.0 %    MCV 94.2 78.0 - 100.0 fL    MCH 30.1 27.0 - 31.0 pg    MCHC 32.0 32.0 - 36.0 g/dL    RDW 15.0 (H) 11.7 - 14.9 %    Platelets 197 140 - 440 k/uL    MPV 10.0 7.5 - 11.1 fL   Lipid Panel    Collection Time: 06/16/25  2:34 AM   Result Value Ref Range    Cholesterol, Total 151 125 - 199 mg/dL    HDL 67 >40 mg/dL    LDL Cholesterol 47 <101 mg/dL    Triglycerides 186 (H) <150 mg/dL   Valproic Acid

## 2025-06-16 NOTE — PLAN OF CARE
Problem: Discharge Planning  Goal: Discharge to home or other facility with appropriate resources  Outcome: Progressing     Problem: Safety - Adult  Goal: Free from fall injury  6/15/2025 2313 by Shree Monroe RN  Outcome: Progressing  6/15/2025 1815 by Anali Portillo RN  Outcome: Progressing     Problem: ABCDS Injury Assessment  Goal: Absence of physical injury  6/15/2025 2313 by Shree Monroe RN  Outcome: Progressing  6/15/2025 1815 by Anali Portillo RN  Outcome: Progressing     Problem: Pain  Goal: Verbalizes/displays adequate comfort level or baseline comfort level  Outcome: Progressing

## 2025-06-17 ENCOUNTER — HOSPITAL ENCOUNTER (OUTPATIENT)
Dept: WOUND CARE | Age: 70
Discharge: HOME | End: 2025-06-17
Attending: NURSE PRACTITIONER

## 2025-06-17 VITALS
RESPIRATION RATE: 16 BRPM | WEIGHT: 282.41 LBS | OXYGEN SATURATION: 100 % | HEIGHT: 71 IN | BODY MASS INDEX: 39.54 KG/M2 | HEART RATE: 89 BPM | TEMPERATURE: 97.6 F | SYSTOLIC BLOOD PRESSURE: 109 MMHG | DIASTOLIC BLOOD PRESSURE: 58 MMHG

## 2025-06-17 PROCEDURE — 6370000000 HC RX 637 (ALT 250 FOR IP): Performed by: INTERNAL MEDICINE

## 2025-06-17 PROCEDURE — 94761 N-INVAS EAR/PLS OXIMETRY MLT: CPT

## 2025-06-17 PROCEDURE — 97116 GAIT TRAINING THERAPY: CPT

## 2025-06-17 PROCEDURE — 2500000003 HC RX 250 WO HCPCS: Performed by: INTERNAL MEDICINE

## 2025-06-17 PROCEDURE — 6370000000 HC RX 637 (ALT 250 FOR IP): Performed by: NURSE PRACTITIONER

## 2025-06-17 PROCEDURE — 99232 SBSQ HOSP IP/OBS MODERATE 35: CPT | Performed by: NURSE PRACTITIONER

## 2025-06-17 RX ORDER — ATORVASTATIN CALCIUM 40 MG/1
40 TABLET, FILM COATED ORAL NIGHTLY
Qty: 30 TABLET | Refills: 3 | Status: SHIPPED | OUTPATIENT
Start: 2025-06-17

## 2025-06-17 RX ADMIN — GABAPENTIN 300 MG: 300 CAPSULE ORAL at 10:11

## 2025-06-17 RX ADMIN — FUROSEMIDE 40 MG: 40 TABLET ORAL at 10:11

## 2025-06-17 RX ADMIN — ARIPIPRAZOLE 30 MG: 10 TABLET ORAL at 20:01

## 2025-06-17 RX ADMIN — GABAPENTIN 300 MG: 300 CAPSULE ORAL at 20:00

## 2025-06-17 RX ADMIN — IBUPROFEN 400 MG: 400 TABLET, FILM COATED ORAL at 14:24

## 2025-06-17 RX ADMIN — APIXABAN 5 MG: 5 TABLET, FILM COATED ORAL at 20:01

## 2025-06-17 RX ADMIN — FUROSEMIDE 40 MG: 40 TABLET ORAL at 16:47

## 2025-06-17 RX ADMIN — IBUPROFEN 400 MG: 400 TABLET, FILM COATED ORAL at 03:48

## 2025-06-17 RX ADMIN — SODIUM CHLORIDE, PRESERVATIVE FREE 10 ML: 5 INJECTION INTRAVENOUS at 10:10

## 2025-06-17 RX ADMIN — GABAPENTIN 300 MG: 300 CAPSULE ORAL at 14:24

## 2025-06-17 RX ADMIN — SODIUM CHLORIDE, PRESERVATIVE FREE 10 ML: 5 INJECTION INTRAVENOUS at 20:01

## 2025-06-17 RX ADMIN — Medication 1000 UNITS: at 10:11

## 2025-06-17 RX ADMIN — ATORVASTATIN CALCIUM 40 MG: 40 TABLET, FILM COATED ORAL at 20:01

## 2025-06-17 RX ADMIN — APIXABAN 5 MG: 5 TABLET, FILM COATED ORAL at 10:11

## 2025-06-17 RX ADMIN — POTASSIUM CHLORIDE 10 MEQ: 750 TABLET, EXTENDED RELEASE ORAL at 10:11

## 2025-06-17 RX ADMIN — POTASSIUM CHLORIDE 10 MEQ: 750 TABLET, EXTENDED RELEASE ORAL at 20:01

## 2025-06-17 ASSESSMENT — PAIN SCALES - GENERAL
PAINLEVEL_OUTOF10: 3
PAINLEVEL_OUTOF10: 0
PAINLEVEL_OUTOF10: 0
PAINLEVEL_OUTOF10: 1
PAINLEVEL_OUTOF10: 0
PAINLEVEL_OUTOF10: 0

## 2025-06-17 ASSESSMENT — PAIN DESCRIPTION - PAIN TYPE: TYPE: ACUTE PAIN

## 2025-06-17 ASSESSMENT — PAIN DESCRIPTION - ONSET: ONSET: ON-GOING

## 2025-06-17 ASSESSMENT — PAIN DESCRIPTION - FREQUENCY: FREQUENCY: CONTINUOUS

## 2025-06-17 ASSESSMENT — PAIN DESCRIPTION - LOCATION: LOCATION: LEG;KNEE

## 2025-06-17 ASSESSMENT — PAIN - FUNCTIONAL ASSESSMENT: PAIN_FUNCTIONAL_ASSESSMENT: ACTIVITIES ARE NOT PREVENTED

## 2025-06-17 ASSESSMENT — PAIN DESCRIPTION - DESCRIPTORS: DESCRIPTORS: ACHING

## 2025-06-17 ASSESSMENT — PAIN DESCRIPTION - ORIENTATION: ORIENTATION: RIGHT;LEFT;LOWER

## 2025-06-17 NOTE — FLOWSHEET NOTE
06/17/25 1503   Vitals   Temp 97.6 °F (36.4 °C)   Temp Source Oral   Pulse 64   Heart Rate Source Monitor   Respirations 16   /62   MAP (Calculated) 85   MAP (mmHg) 77   BP Location Right upper arm   BP Upper/Lower Upper   BP Method Automatic   Patient Position Semi fowlers

## 2025-06-17 NOTE — PROGRESS NOTES
Outpatient Pharmacy Progress Note for Meds-to-Beds    Total number of Prescriptions Filled: 1    Additional Documentation:  Medication(s) were delivered to the patient's room prior to discharge      Thank you for letting us serve your patients.  [address] [address]    Phone: [phone]    Fax: [phone]

## 2025-06-17 NOTE — PLAN OF CARE
Problem: Discharge Planning  Goal: Discharge to home or other facility with appropriate resources  6/17/2025 1935 by Fernando Snell RN  Outcome: Progressing  6/17/2025 1233 by Heather Wesley RN  Outcome: Progressing     Problem: Safety - Adult  Goal: Free from fall injury  6/17/2025 1935 by Fernando Snell RN  Outcome: Progressing  6/17/2025 1233 by Heather Wesley RN  Outcome: Progressing     Problem: ABCDS Injury Assessment  Goal: Absence of physical injury  6/17/2025 1935 by Fernando Snell RN  Outcome: Progressing  6/17/2025 1233 by Heather Wesley RN  Outcome: Progressing     Problem: Pain  Goal: Verbalizes/displays adequate comfort level or baseline comfort level  6/17/2025 1935 by Fernando Snell RN  Outcome: Progressing  6/17/2025 1233 by Heather Wesley RN  Outcome: Progressing     Problem: Confusion  Goal: Confusion, delirium, dementia, or psychosis is improved or at baseline  Description: INTERVENTIONS:  1. Assess for possible contributors to thought disturbance, including medications, impaired vision or hearing, underlying metabolic abnormalities, dehydration, psychiatric diagnoses, and notify attending LIP  2. [place] high risk fall precautions, as indicated  3. Provide frequent short contacts to provide reality reorientation, refocusing and direction  4. Decrease environmental stimuli, including noise as appropriate  5. Monitor and intervene to maintain adequate nutrition, hydration, elimination, sleep and activity  6. If unable to ensure safety without constant attention obtain sitter and review sitter guidelines with assigned personnel  7. Initiate Psychosocial CNS and Spiritual Care consult, as indicated  6/17/2025 1935 by Fernando Snell RN  Outcome: Progressing  6/17/2025 1233 by Heather Wesley RN  Outcome: Progressing     Problem: Behavior  Goal: Pt/Family maintain appropriate behavior and adhere to behavioral management agreement, if
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
## 2025-06-17 NOTE — DISCHARGE SUMMARY
V2.0  Discharge Summary    Name:  Jhonatan Cameron /Age/Sex: 1955 (70 y.o. male)   Admit Date: 6/15/2025  Discharge Date: 25    MRN & CSN:  [id number] & [id number] Encounter Date and Time 25 10:16 AM EDT    Attending:  Lm Villalba MD Discharging Provider: Lm Villalba MD       Hospital Course:     Brief HPI:     Jhonatan Cameron is a 70 y.o. male that presents with dizziness. Patient's last known well is reported as 10 PM last night when he went to bed, per patient.  Dizziness is described as a spinning sensation.  Symptoms are worse with position change including laying flat.  Patient reports \"I think I had a mini stroke\".  Patient reports he has had 1 previous mini stroke.  No numbness or weakness other than baseline neuropathy.  No vision changes.  No difficulty with speech or understanding.     Brief Problem Based Course:     Patient clinically stable for transfer to inpatient psych    Dizziness and unsteady gait - resolved   - ED did not call a stroke alert as symptoms had resolved prior to physician's evaluation  - CT head reassuring  - CTA reassuring  - ED requested a posterior stroke evaluation  - Aspirin given in ED, will DC ASA as he does not have a stroke, cont Lipitor 40 mg     -MRI Brain: no acute abnormalities or CVA, chronic microvascular ischemic changes      Bipolar disorder - having Manic Episode   Schizo affective Schizophrenia   - Psych Consult: EMPERATRIZ Escobedo, recommends Sitter and Inpatient Psych, as he is Manic and unable to make rationale decisions for himself, took himself off all his Psych meds  -per Psych: Continue Abilify and gabapentin.  Risperdal, Depakote and lithium discontinued as patient no longer taking.       Hx of DVT and PE   -cont Eliquis      Elevated blood pressure  - Mild hypertension noted in ED, resolved     Hyperlipidemia   -started on Lipitor 40 mg      History of lower extremity lymphedema     Polyneuropathy  -cont gabapentin     COPD

## 2025-06-17 NOTE — PROGRESS NOTES
Physical Therapy Treatment Note  Name: Jhonatan Cameron MRN: [id number] :   1955   Date:  2025   Admission Date: 6/15/2025 Room:  [address] [place] [address]   Restrictions/Precautions:  Restrictions/Precautions  Restrictions/Precautions: General Precautions, Fall Risk    Communication with other providers:  Pt okay to see for therapy per RN   Subjective:  Patient states:  \"Do you like dad jokes?\"   Pain:   Location, Type, Intensity (0/10 to 10/10):  Endorses B knee pain and notes neuropathy in feet.   Objective:    Observation:  Pt siting up in recliner upon PTA arrival.   Objective Measures:  Tele, HR 90s-Low 100s.   Treatment, including education/measures:    Therapeutic Activity Training:   Therapeutic activity training was instructed today.  Cues were given for safety, sequence, UE/LE placement, awareness, and balance. Activities performed today included STS.     Mobility:  STS: SBA from recliner     Gait:  Pt ambulated ~400ft x 5 with no AD and CGA progressing to SBA. WBOS, decrease step length, decreased gait speed, postural sway noted.     Safety:   Gait belt donned this session. Pt returned safely to recliner with chair alarm activated, call light in reach, all needs met.   Assessment / Impression:    Pt tolerated OOB activities well this date with minimal functional mobility deficits noted.   Patient's tolerance of treatment:  Good   Adverse Reaction: none  Significant change in status and impact:  none  Barriers to improvement:  none  Plan for Next Session:    Plan to continue OOB Activities.   Time in:  1022  Time out:  1041  Timed treatment minutes:  19  Total treatment time:  19    Previously filed items:  Social/Functional History  Lives With: Alone  Type of Home:  (\"I've been living at the [facility]\")  Home Layout: One level  Bathroom Shower/Tub: Tub/Shower unit  Bathroom Equipment: Grab bars in shower  Home Equipment: None  Has the patient had two or more falls in the past year or any fall with

## 2025-06-17 NOTE — CARE COORDINATION
Transfer Center Handoff for Behavioral Health Transfers      Patient's Current Location: [facility] 3E     Chief Complaint   Patient presents with    Dizziness     Dizziness since 0530, hypertension       Current or History of Violent Behavior: No    Currently in Restraints Now or During this Encounter: No  (Specify if Agitation or self harm is noted in ED?)  If yes, please describe behaviors requiring restraint:             Medical Clearance Documented and Verified in the Chart: Yes    Allergies   Allergen Reactions    Thorazine [Chlorpromazine] Other (See Comments)     \"makes me do strange things\"        Can Patient Tolerate Lying Flat: Yes    Able to Perform ADLs:  Yes  (Specify if able to ambulate or uses any mobility devices such as cane or walker)  Activity: In bed  Level of Assistance:    Assistive Device: Gait belt  Miscellaneous Devices:      LABS    CBC:   Lab Results   Component Value Date/Time    WBC 7.9 06/16/2025 02:34 AM    RBC 4.28 06/16/2025 02:34 AM    HGB 12.9 06/16/2025 02:34 AM    HCT 40.3 06/16/2025 02:34 AM    MCV 94.2 06/16/2025 02:34 AM    MCH 30.1 06/16/2025 02:34 AM    MCHC 32.0 06/16/2025 02:34 AM    RDW 15.0 06/16/2025 02:34 AM     06/16/2025 02:34 AM    MPV 10.0 06/16/2025 02:34 AM     CMP:   Lab Results   Component Value Date/Time     06/16/2025 02:34 AM    K 3.9 06/16/2025 02:34 AM     06/16/2025 02:34 AM    CO2 26 06/16/2025 02:34 AM    BUN 22 06/16/2025 02:34 AM    CREATININE 1.0 06/16/2025 02:34 AM    GFRAA >60 06/17/2022 11:01 AM    LABGLOM 77 06/16/2025 02:34 AM    LABGLOM >60 11/30/2023 11:20 AM    GLUCOSE 139 06/16/2025 02:34 AM    CALCIUM 8.7 06/16/2025 02:34 AM    BILITOT 0.3 06/15/2025 11:54 AM    ALKPHOS 109 06/15/2025 11:54 AM    AST 19 06/15/2025 11:54 AM    ALT 18 06/15/2025 11:54 AM     Drug Panel:   Lab Results   Component Value Date/Time    OPIAU NEGATIVE 03/08/2025 09:15 PM     UA:  Lab Results   Component Value Date/Time    COLORU Yellow 03/08/2025

## 2025-06-17 NOTE — PLAN OF CARE
Problem: Safety - Adult  Goal: Free from fall injury  Outcome: Progressing     Problem: ABCDS Injury Assessment  Goal: Absence of physical injury  Outcome: Progressing     Problem: Pain  Goal: Verbalizes/displays adequate comfort level or baseline comfort level  Outcome: Progressing     Problem: Confusion  Goal: Confusion, delirium, dementia, or psychosis is improved or at baseline  Description: INTERVENTIONS:  1. Assess for possible contributors to thought disturbance, including medications, impaired vision or hearing, underlying metabolic abnormalities, dehydration, psychiatric diagnoses, and notify attending LIP  2. [place] high risk fall precautions, as indicated  3. Provide frequent short contacts to provide reality reorientation, refocusing and direction  4. Decrease environmental stimuli, including noise as appropriate  5. Monitor and intervene to maintain adequate nutrition, hydration, elimination, sleep and activity  6. If unable to ensure safety without constant attention obtain sitter and review sitter guidelines with assigned personnel  7. Initiate Psychosocial CNS and Spiritual Care consult, as indicated  Outcome: Progressing     Problem: Cardiovascular - Adult  Goal: Maintains optimal cardiac output and hemodynamic stability  Outcome: Progressing  Goal: Absence of cardiac dysrhythmias or at baseline  Outcome: Progressing     Problem: Skin/Tissue Integrity - Adult  Goal: Skin integrity remains intact  Outcome: Progressing  Goal: Oral mucous membranes remain intact  Outcome: Progressing     Problem: Neurosensory - Adult  Goal: Achieves stable or improved neurological status  Outcome: Progressing  Goal: Achieves maximal functionality and self care  Outcome: Progressing

## 2025-06-18 NOTE — PROGRESS NOTES
Discharged patient, well and stable to [facility] via [facility]. IV line removed. Patient transported per stretcher. Documents handed over to transport crew.

## 2025-07-08 ENCOUNTER — TELEPHONE (OUTPATIENT)
Dept: WOUND CARE | Age: 70
End: 2025-07-08

## 2025-07-14 ENCOUNTER — HOSPITAL ENCOUNTER (EMERGENCY)
Age: 70
Discharge: HOME | End: 2025-07-14
Attending: EMERGENCY MEDICINE
Payer: MEDICARE

## 2025-07-14 VITALS
RESPIRATION RATE: 17 BRPM | OXYGEN SATURATION: 99 % | HEIGHT: 71 IN | HEART RATE: 62 BPM | SYSTOLIC BLOOD PRESSURE: 146 MMHG | BODY MASS INDEX: 39.2 KG/M2 | DIASTOLIC BLOOD PRESSURE: 62 MMHG | WEIGHT: 280 LBS | TEMPERATURE: 98.6 F

## 2025-07-14 DIAGNOSIS — R60.0 PERIPHERAL EDEMA: Primary | ICD-10-CM

## 2025-07-14 PROCEDURE — 6370000000 HC RX 637 (ALT 250 FOR IP): Performed by: EMERGENCY MEDICINE

## 2025-07-14 PROCEDURE — 99283 EMERGENCY DEPT VISIT LOW MDM: CPT

## 2025-07-14 RX ORDER — FUROSEMIDE 20 MG/1
40 TABLET ORAL ONCE
Status: COMPLETED | OUTPATIENT
Start: 2025-07-14 | End: 2025-07-14

## 2025-07-14 RX ADMIN — FUROSEMIDE 40 MG: 20 TABLET ORAL at 06:59

## 2025-07-14 ASSESSMENT — PAIN DESCRIPTION - DESCRIPTORS: DESCRIPTORS: ACHING;DISCOMFORT

## 2025-07-14 ASSESSMENT — PAIN - FUNCTIONAL ASSESSMENT
PAIN_FUNCTIONAL_ASSESSMENT: 0-10
PAIN_FUNCTIONAL_ASSESSMENT: NONE - DENIES PAIN

## 2025-07-14 ASSESSMENT — PAIN DESCRIPTION - LOCATION: LOCATION: KNEE

## 2025-07-14 ASSESSMENT — PAIN DESCRIPTION - PAIN TYPE: TYPE: CHRONIC PAIN

## 2025-07-14 ASSESSMENT — PAIN SCALES - GENERAL: PAINLEVEL_OUTOF10: 3

## 2025-07-14 NOTE — ED PROVIDER NOTES
a 70 y.o. male who presents to the Emergency Department complaining of bilateral lower extremity swelling.  The patient states that he has a history of peripheral edema for which he takes Lasix.  He states he is due for his morning dose but did not have access to his [place].  He is staying at the [facility] and states that his medications are at that location.  He states he just came to the ER for his morning dose of the Lasix.  He is unsure of the dose but states that is in the chart.  Leg swelling is chronic.  There are no exacerbating or alleviating factors.  He denies any associated shortness of breath.  The patient denies a history of DVT or PE. The patient denies fevers, chills, chest pain, shortness of breath, abdominal pain, numbness, tingling, weakness, or any other complaints.    History from : Patient    Limitations to history : None    Chronic conditions affecting care:   Past Medical History:   Diagnosis Date    Arthritis     Bipolar 1 disorder (HCC)     COPD (chronic obstructive pulmonary disease) ([facility])     \"use to have copd but is better\"    Hx of blood clots     \"had blood clot in lung and in my legs- that was in 2016- on Eliquis\"    Hyperlipidemia     Hypertension     Neuropathy     Schizo affective schizophrenia (HCC)     \"follow with Dr GERHARD Ramirez at [facility]    Tremors of nervous system     WD-Arterial insufficiency of lower extremity (HCC) 2/4/2021    WD-Cellulitis of left lower leg 2/4/2021    WD-Venous insufficiency of both lower extremities 2/4/2021    WDCellulitis of leg, right 2/4/2021       Social Determinants : Patient is Homeless    Records reviewed: The patient was admitted for evaluation of vertigo on 6/15/2025 through 6/17/2025.  CT head was negative.  CTA head and neck was negative.  MRI of brain showed no acute abnormalities.  He was also found to be manic and unable to make rational decisions during that admission as he had taken himself off all of his

## 2025-07-18 ENCOUNTER — APPOINTMENT (OUTPATIENT)
Dept: CT IMAGING | Age: 70
End: 2025-07-18
Payer: MEDICARE

## 2025-07-18 ENCOUNTER — HOSPITAL ENCOUNTER (OUTPATIENT)
Age: 70
Setting detail: OBSERVATION
Discharge: HOME | End: 2025-07-19
Attending: EMERGENCY MEDICINE
Payer: MEDICARE

## 2025-07-18 DIAGNOSIS — R29.810 FACIAL DROOP: ICD-10-CM

## 2025-07-18 DIAGNOSIS — R47.81 SLURRED SPEECH: Primary | ICD-10-CM

## 2025-07-18 PROBLEM — G45.9 TIA (TRANSIENT ISCHEMIC ATTACK): Status: ACTIVE | Noted: 2025-07-18

## 2025-07-18 LAB
ALBUMIN SERPL-MCNC: 4.1 G/DL (ref 3.4–5)
ALBUMIN/GLOB SERPL: 1.3 {RATIO} (ref 1.1–2.2)
ALP SERPL-CCNC: 118 U/L (ref 40–129)
ALT SERPL-CCNC: 25 U/L (ref 10–40)
ANION GAP SERPL CALCULATED.3IONS-SCNC: 10 MMOL/L (ref 9–17)
AST SERPL-CCNC: 28 U/L (ref 15–37)
BASOPHILS # BLD: 0.05 K/UL
BASOPHILS NFR BLD: 1 % (ref 0–1)
BILIRUB SERPL-MCNC: 0.4 MG/DL (ref 0–1)
BUN SERPL-MCNC: 17 MG/DL (ref 7–20)
CALCIUM SERPL-MCNC: 9.6 MG/DL (ref 8.3–10.6)
CHLORIDE SERPL-SCNC: 96 MMOL/L (ref 99–110)
CHP ED QC CHECK: NORMAL
CO2 SERPL-SCNC: 29 MMOL/L (ref 21–32)
CREAT SERPL-MCNC: 1.1 MG/DL (ref 0.8–1.3)
EKG ATRIAL RATE: 87 BPM
EKG DIAGNOSIS: NORMAL
EKG P AXIS: 58 DEGREES
EKG P-R INTERVAL: 194 MS
EKG Q-T INTERVAL: 412 MS
EKG QRS DURATION: 114 MS
EKG QTC CALCULATION (BAZETT): 495 MS
EKG R AXIS: -37 DEGREES
EKG T AXIS: 60 DEGREES
EKG VENTRICULAR RATE: 87 BPM
EOSINOPHIL # BLD: 0.34 K/UL
EOSINOPHILS RELATIVE PERCENT: 4 % (ref 0–3)
ERYTHROCYTE [DISTWIDTH] IN BLOOD BY AUTOMATED COUNT: 14.3 % (ref 11.7–14.9)
GFR, ESTIMATED: 67 ML/MIN/1.73M2
GLUCOSE BLD-MCNC: 102 MG/DL
GLUCOSE BLD-MCNC: 102 MG/DL (ref 74–99)
GLUCOSE SERPL-MCNC: 85 MG/DL (ref 74–99)
HCT VFR BLD AUTO: 44.2 % (ref 42–52)
HGB BLD-MCNC: 14.3 G/DL (ref 13.5–18)
IMM GRANULOCYTES # BLD AUTO: 0.03 K/UL
IMM GRANULOCYTES NFR BLD: 0 %
INR PPP: 1
LYMPHOCYTES NFR BLD: 1.49 K/UL
LYMPHOCYTES RELATIVE PERCENT: 16 % (ref 24–44)
MCH RBC QN AUTO: 29.7 PG (ref 27–31)
MCHC RBC AUTO-ENTMCNC: 32.4 G/DL (ref 32–36)
MCV RBC AUTO: 91.9 FL (ref 78–100)
MONOCYTES NFR BLD: 0.61 K/UL
MONOCYTES NFR BLD: 7 % (ref 0–5)
NEUTROPHILS NFR BLD: 73 % (ref 36–66)
NEUTS SEG NFR BLD: 6.74 K/UL
PLATELET # BLD AUTO: 179 K/UL (ref 140–440)
PMV BLD AUTO: 9.8 FL (ref 7.5–11.1)
POTASSIUM SERPL-SCNC: 4.1 MMOL/L (ref 3.5–5.1)
PROT SERPL-MCNC: 7.4 G/DL (ref 6.4–8.2)
PROTHROMBIN TIME: 13.7 SEC (ref 11.7–14.5)
RBC # BLD AUTO: 4.81 M/UL (ref 4.6–6.2)
SODIUM SERPL-SCNC: 134 MMOL/L (ref 136–145)
TROPONIN I SERPL HS-MCNC: 17 NG/L (ref 0–22)
WBC OTHER # BLD: 9.3 K/UL (ref 4–10.5)

## 2025-07-18 PROCEDURE — 2500000003 HC RX 250 WO HCPCS: Performed by: STUDENT IN AN ORGANIZED HEALTH CARE EDUCATION/TRAINING PROGRAM

## 2025-07-18 PROCEDURE — 82962 GLUCOSE BLOOD TEST: CPT

## 2025-07-18 PROCEDURE — 94761 N-INVAS EAR/PLS OXIMETRY MLT: CPT

## 2025-07-18 PROCEDURE — 85610 PROTHROMBIN TIME: CPT

## 2025-07-18 PROCEDURE — G0378 HOSPITAL OBSERVATION PER HR: HCPCS

## 2025-07-18 PROCEDURE — 6370000000 HC RX 637 (ALT 250 FOR IP): Performed by: EMERGENCY MEDICINE

## 2025-07-18 PROCEDURE — 99285 EMERGENCY DEPT VISIT HI MDM: CPT

## 2025-07-18 PROCEDURE — 93005 ELECTROCARDIOGRAM TRACING: CPT | Performed by: EMERGENCY MEDICINE

## 2025-07-18 PROCEDURE — 85025 COMPLETE CBC W/AUTO DIFF WBC: CPT

## 2025-07-18 PROCEDURE — 70450 CT HEAD/BRAIN W/O DYE: CPT

## 2025-07-18 PROCEDURE — 6370000000 HC RX 637 (ALT 250 FOR IP): Performed by: STUDENT IN AN ORGANIZED HEALTH CARE EDUCATION/TRAINING PROGRAM

## 2025-07-18 PROCEDURE — 93010 ELECTROCARDIOGRAM REPORT: CPT | Performed by: INTERNAL MEDICINE

## 2025-07-18 PROCEDURE — 76937 US GUIDE VASCULAR ACCESS: CPT

## 2025-07-18 PROCEDURE — 84484 ASSAY OF TROPONIN QUANT: CPT

## 2025-07-18 PROCEDURE — 80053 COMPREHEN METABOLIC PANEL: CPT

## 2025-07-18 RX ORDER — ONDANSETRON 2 MG/ML
4 INJECTION INTRAMUSCULAR; INTRAVENOUS EVERY 6 HOURS PRN
Status: DISCONTINUED | OUTPATIENT
Start: 2025-07-18 | End: 2025-07-19 | Stop reason: HOSPADM

## 2025-07-18 RX ORDER — SODIUM CHLORIDE 0.9 % (FLUSH) 0.9 %
5-40 SYRINGE (ML) INJECTION EVERY 12 HOURS SCHEDULED
Status: DISCONTINUED | OUTPATIENT
Start: 2025-07-18 | End: 2025-07-19 | Stop reason: HOSPADM

## 2025-07-18 RX ORDER — POTASSIUM CHLORIDE 7.45 MG/ML
10 INJECTION INTRAVENOUS PRN
Status: DISCONTINUED | OUTPATIENT
Start: 2025-07-18 | End: 2025-07-19 | Stop reason: HOSPADM

## 2025-07-18 RX ORDER — ACETAMINOPHEN 325 MG/1
650 TABLET ORAL EVERY 6 HOURS PRN
Status: DISCONTINUED | OUTPATIENT
Start: 2025-07-18 | End: 2025-07-19 | Stop reason: HOSPADM

## 2025-07-18 RX ORDER — POLYETHYLENE GLYCOL 3350 17 G/17G
17 POWDER, FOR SOLUTION ORAL DAILY PRN
Status: DISCONTINUED | OUTPATIENT
Start: 2025-07-18 | End: 2025-07-19 | Stop reason: HOSPADM

## 2025-07-18 RX ORDER — ASPIRIN 81 MG/1
81 TABLET, CHEWABLE ORAL ONCE
Status: COMPLETED | OUTPATIENT
Start: 2025-07-18 | End: 2025-07-18

## 2025-07-18 RX ORDER — ONDANSETRON 4 MG/1
4 TABLET, ORALLY DISINTEGRATING ORAL EVERY 8 HOURS PRN
Status: DISCONTINUED | OUTPATIENT
Start: 2025-07-18 | End: 2025-07-19 | Stop reason: HOSPADM

## 2025-07-18 RX ORDER — POTASSIUM CHLORIDE 1500 MG/1
40 TABLET, EXTENDED RELEASE ORAL PRN
Status: DISCONTINUED | OUTPATIENT
Start: 2025-07-18 | End: 2025-07-19 | Stop reason: HOSPADM

## 2025-07-18 RX ORDER — SODIUM CHLORIDE 0.9 % (FLUSH) 0.9 %
5-40 SYRINGE (ML) INJECTION PRN
Status: DISCONTINUED | OUTPATIENT
Start: 2025-07-18 | End: 2025-07-19 | Stop reason: HOSPADM

## 2025-07-18 RX ORDER — ACETAMINOPHEN 650 MG/1
650 SUPPOSITORY RECTAL EVERY 6 HOURS PRN
Status: DISCONTINUED | OUTPATIENT
Start: 2025-07-18 | End: 2025-07-19 | Stop reason: HOSPADM

## 2025-07-18 RX ORDER — ATORVASTATIN CALCIUM 40 MG/1
40 TABLET, FILM COATED ORAL NIGHTLY
Status: DISCONTINUED | OUTPATIENT
Start: 2025-07-18 | End: 2025-07-19 | Stop reason: HOSPADM

## 2025-07-18 RX ORDER — ARIPIPRAZOLE 10 MG/1
30 TABLET ORAL DAILY
Status: DISCONTINUED | OUTPATIENT
Start: 2025-07-18 | End: 2025-07-19 | Stop reason: HOSPADM

## 2025-07-18 RX ORDER — GABAPENTIN 300 MG/1
300 CAPSULE ORAL 3 TIMES DAILY
Status: DISCONTINUED | OUTPATIENT
Start: 2025-07-18 | End: 2025-07-19 | Stop reason: HOSPADM

## 2025-07-18 RX ORDER — SODIUM CHLORIDE 9 MG/ML
INJECTION, SOLUTION INTRAVENOUS PRN
Status: DISCONTINUED | OUTPATIENT
Start: 2025-07-18 | End: 2025-07-19 | Stop reason: HOSPADM

## 2025-07-18 RX ORDER — MAGNESIUM SULFATE IN WATER 40 MG/ML
2000 INJECTION, SOLUTION INTRAVENOUS PRN
Status: DISCONTINUED | OUTPATIENT
Start: 2025-07-18 | End: 2025-07-19 | Stop reason: HOSPADM

## 2025-07-18 RX ADMIN — ASPIRIN 81 MG 81 MG: 81 TABLET ORAL at 17:23

## 2025-07-18 RX ADMIN — ARIPIPRAZOLE 30 MG: 10 TABLET ORAL at 20:33

## 2025-07-18 RX ADMIN — GABAPENTIN 300 MG: 300 CAPSULE ORAL at 20:33

## 2025-07-18 RX ADMIN — APIXABAN 5 MG: 5 TABLET, FILM COATED ORAL at 20:34

## 2025-07-18 RX ADMIN — SODIUM CHLORIDE, PRESERVATIVE FREE 10 ML: 5 INJECTION INTRAVENOUS at 20:34

## 2025-07-18 RX ADMIN — ATORVASTATIN CALCIUM 40 MG: 40 TABLET, FILM COATED ORAL at 20:33

## 2025-07-18 NOTE — ED PROVIDER NOTES
EMERGENCY DEPARTMENT ENCOUNTER      CHIEF COMPLAINT:   Facial droop  Slurred speech    HPI: Jhonatan Cameron is a 70 y.o. male, with a past medical history of hypertension, hyperlipidemia, DVT and PE, schizophrenia, COPD and bipolar disorder who presents to the emergency department, via EMS, for evaluation of right-sided facial droop and slurred speech.  The patient was last known well at 12:45 PM.  He states that he decided to take a nap and when he woke up he was having slurred speech.  He called EMS who called a stroke alert in the field and transported the patient here.  His symptoms have persisted at this time.  He otherwise denies any blurred vision, confusion, headache, neck stiffness, chest pain, shortness of breath, nausea, vomiting, numbness, tingling, arm or leg weakness or any other complaints.    REVIEW OF SYSTEMS:   Constitutional:  Denies fever or chills  Eyes:  Denies change in visual acuity  HENT:  Denies nasal congestion or sore throat  Respiratory:  Denies cough or shortness of breath  Cardiovascular:  Denies chest pain or edema  GI:  Denies abdominal pain, nausea, vomiting, bloody stools or diarrhea  :  Denies dysuria  Musculoskeletal:  Denies back pain or joint pain  Integument:  Denies rash  Neurologic: See HPI  \"Remaining review of systems reviewed and negative. I have reviewed the nursing triage documentation and agree unless otherwise noted below.\"      PAST MEDICAL HISTORY:   Past Medical History:   Diagnosis Date    Arthritis     Bipolar 1 disorder (HCC)     COPD (chronic obstructive pulmonary disease) ([facility])     \"use to have copd but is better\"    Hx of blood clots     \"had blood clot in lung and in my legs- that was in 2016- on Eliquis\"    Hyperlipidemia     Hypertension     Neuropathy     Schizo affective schizophrenia (HCC)     \"follow with [facility]    Tremors of nervous system     WD-Arterial insufficiency of lower extremity (HCC) 2/4/2021    WD-Cellulitis of left lower leg  2/4/2021    WD-Venous insufficiency of both lower extremities 2/4/2021    WDCellulitis of leg, right 2/4/2021       CURRENT MEDICATIONS:   Home medications reviewed.    SURGICAL HISTORY:   Past Surgical History:   Procedure Laterality Date    COLONOSCOPY  last one 2012    DENTAL SURGERY      \"wisdom teeth put to sleep- yrs ago\"    FRACTURE SURGERY  age 25    fx left fibia and tibia- hardware later removed    HYDROCELE EXCISION  10+ yrs ago    IR GUIDED IVC FILTER PLACEMENT N/A 07/14/2016    ALN filter lot#[id number], exp - Dr [facility]    JOINT REPLACEMENT  2012    L knee    UPPER GASTROINTESTINAL ENDOSCOPY  04/12/2018       FAMILY HISTORY:   Family History   Problem Relation Age of Onset    Cancer Mother         breast ca age 45, then later had someother form of cancer?unsure what it was    Heart Disease Father     Stroke Father     Cancer Sister     No Known Problems Brother        SOCIAL HISTORY:   Social History     Socioeconomic History    Marital status: Single     Spouse name: Not on file    Number of children: Not on file    Years of education: Not on file    Highest education level: Not on file   Occupational History    Not on file   Tobacco Use    Smoking status: Never    Smokeless tobacco: Never   Vaping Use    Vaping status: Never Used   Substance and Sexual Activity    Alcohol use: No     Comment: stated has not had a drink in a year/\"quit drinking  30 yrs ago- use to drink on weekends- 4-5 beers\"    Drug use: Not Currently     Types: Marijuana (Weed)     Comment: \"last used over 30 yrs ago\"    Sexual activity: Not on file   Other Topics Concern    Not on file   Social History Narrative    Not on file     Social Drivers of Health     Financial Resource Strain: Not on file   Food Insecurity: No Food Insecurity (6/15/2025)    Hunger Vital Sign     Worried About Running Out of Food in the Last Year: Never true     Ran Out of Food in the Last Year: Never true   Transportation Needs: No

## 2025-07-18 NOTE — ED NOTES
Called Lab to check on bloodwork, spoke with qasim and he stated he will talk to chemistry and see what's going on to get the results.

## 2025-07-18 NOTE — H&P
V2.0  History and Physical      Name:  Jhonatan Cameron /Age/Sex: 1955  (70 y.o. male)   MRN & CSN:  [id number] & [id number] Encounter Date/Time: 2025 5:41 PM EDT   Location:   PCP: Kaleb Ziegler APRN - CNP       Hospital Day: 1    Assessment and Plan:   Jhonatan Cameron is a 70 y.o. male  who presents with TIA (transient ischemic attack)    Hospital Problems           Last Modified POA    * (Principal) TIA (transient ischemic attack) 2025 Yes       Assessment and Plan:  Slurred speech.  Last known well around 1 PM.  NIH of 1.  Patient is on Eliquis not a candidate for TNK as per stroke team as well.  Aspirin and statin.  Telemetry in place neurology consulted MRI of the brain has been ordered CT head negative.  Patient IV ripped off patient refusing CTA.  Awaiting MRI results.  Advance diet as tolerated able to swallow.  SLP to confirm.  Regular soft diet  Hyperlipidemia atorvastatin  Mood disorder on Abilify at home  Peripheral neuropathy on gabapentin at home  Chronic diastolic heart failure with preserved ejection fraction on Lasix at home which can be held  History of vitamin D deficiency    Advance Care Planning    10 minutes were spent discussing the patient's resuscitation status, advance care planning, and end of life care with the patient and/or family/surrogate.    The patient and/or family/surrogate voluntarily agreed to participate in ACP services.    Patient's cognitive capacity: Alert and oriented X3    I answered all the patient/family questions that I could within the range and scope of the current medical situation.  We discussed the medical conditions, risks, benefits, outcomes, and goals of care at this time for the patient's medical issues at hand in the face of the patient's chronic issues and current presentation.    Summary of Discussion:     Outcome:    Home Meds Documented: [] Yes [] Reconciled as much as possible (based on acuity) through chart review and  for input(s): \"PROBNP\" in the last 72 hours.  UA:  Lab Results   Component Value Date/Time    NITRU NEGATIVE 03/08/2025 09:15 PM    COLORU Yellow 03/08/2025 09:15 PM    PHUR 5.5 03/08/2025 09:15 PM    WBCUA <1 03/08/2025 09:15 PM    RBCUA <1 03/08/2025 09:15 PM    RBCUA 5 10/27/2022 01:37 PM    MUCUS RARE 03/08/2025 09:15 PM    TRICHOMONAS None seen 11/30/2024 08:24 PM    BACTERIA RARE 11/30/2024 08:24 PM    CLARITYU CLEAR 10/27/2022 01:37 PM    LEUKOCYTESUR NEGATIVE 03/08/2025 09:15 PM    UROBILINOGEN 0.2 03/08/2025 09:15 PM    BILIRUBINUR NEGATIVE 03/08/2025 09:15 PM    BLOODU SMALL 10/27/2022 01:37 PM    GLUCOSEU NEGATIVE 03/08/2025 09:15 PM    KETUA NEGATIVE 03/08/2025 09:15 PM     Urine Cultures: No results found for: \"LABURIN\"  Blood Cultures: No results found for: \"BC\"  No results found for: \"BLOODCULT2\"  Organism: No results found for: \"ORG\"    Imaging/Diagnostics Last 24 Hours   CT HEAD WO CONTRAST  Result Date: 7/18/2025  PROCEDURE: CT HEAD WO CONTRAST DATE OF EXAM:  7/18/2025 13:34 DEMOGRAPHICS: 70 years old Male INDICATION: Right facial droop, slurred speech TECHNIQUE: CT HEAD WO CONTRAST COMPARISON: June 12, 2025. FINDINGS:  No acute intracranial hemorrhage. Sinuses and mastoid air cells are clear. Calvarium is intact. No significant parenchymal atrophy. Intracranial atherosclerosis. IMPRESSION:  1.  No acute intracranial finding. This dictation was created with voice recognition software.  While attempts have  been made to review the dictation as it is transcribed, on occasion the spoken word can be misinterpreted by the technology leading to omissions or inappropriate words, phrases or sentences.  Dictated and Electronically Signed By: Flavio Alaniz MD [facility] 7/18/2025 13:49          Personally reviewed Lab Studies, Imaging    Electronically signed by Rayshawn Moore MD on 7/18/2025 at 5:41 PM

## 2025-07-18 NOTE — CONSULTS
IV Consult complete.  Nexiva 20g 1.75\" PIV Inserted in Left Forearm  x 1 attempt using sterile ultrasound guided technique.  Brisk blood return, flushes easy.  Labs drawn/sent from straights stick left AC.

## 2025-07-18 NOTE — PROGRESS NOTES
This nurse at bedside. Patient refusing tele. Patient stating he is  immortal and has recovered from his TIA. He would like to remain hospitalized overnight for testing.    Dr GERHARD Moore notified.

## 2025-07-18 NOTE — ED NOTES
Attempted IV access x 4.   IV US was placed and line infiltrated with CT contrast.  Pt refusing CTA at this time.  PICC consult placed and spoke with PICC team to come down for line placement.

## 2025-07-19 ENCOUNTER — APPOINTMENT (OUTPATIENT)
Dept: MRI IMAGING | Age: 70
End: 2025-07-19
Payer: MEDICARE

## 2025-07-19 VITALS
BODY MASS INDEX: 41.45 KG/M2 | DIASTOLIC BLOOD PRESSURE: 63 MMHG | SYSTOLIC BLOOD PRESSURE: 124 MMHG | TEMPERATURE: 98.1 F | HEART RATE: 72 BPM | OXYGEN SATURATION: 95 % | RESPIRATION RATE: 18 BRPM | HEIGHT: 71 IN | WEIGHT: 296.1 LBS

## 2025-07-19 PROBLEM — R47.81 SLURRED SPEECH: Status: ACTIVE | Noted: 2025-07-19

## 2025-07-19 PROBLEM — R29.810 FACIAL DROOP: Status: ACTIVE | Noted: 2025-07-19

## 2025-07-19 LAB
ANION GAP SERPL CALCULATED.3IONS-SCNC: 10 MMOL/L (ref 9–17)
BUN SERPL-MCNC: 20 MG/DL (ref 7–20)
CALCIUM SERPL-MCNC: 9 MG/DL (ref 8.3–10.6)
CHLORIDE SERPL-SCNC: 101 MMOL/L (ref 99–110)
CO2 SERPL-SCNC: 25 MMOL/L (ref 21–32)
CREAT SERPL-MCNC: 1 MG/DL (ref 0.8–1.3)
ERYTHROCYTE [DISTWIDTH] IN BLOOD BY AUTOMATED COUNT: 14.2 % (ref 11.7–14.9)
GFR, ESTIMATED: 77 ML/MIN/1.73M2
GLUCOSE SERPL-MCNC: 112 MG/DL (ref 74–99)
HCT VFR BLD AUTO: 41.5 % (ref 42–52)
HGB BLD-MCNC: 13.3 G/DL (ref 13.5–18)
MAGNESIUM SERPL-MCNC: 2.2 MG/DL (ref 1.8–2.4)
MCH RBC QN AUTO: 30 PG (ref 27–31)
MCHC RBC AUTO-ENTMCNC: 32 G/DL (ref 32–36)
MCV RBC AUTO: 93.5 FL (ref 78–100)
PHOSPHATE SERPL-MCNC: 2.7 MG/DL (ref 2.5–4.9)
PLATELET # BLD AUTO: 167 K/UL (ref 140–440)
PMV BLD AUTO: 10.1 FL (ref 7.5–11.1)
POTASSIUM SERPL-SCNC: 4.5 MMOL/L (ref 3.5–5.1)
RBC # BLD AUTO: 4.44 M/UL (ref 4.6–6.2)
SODIUM SERPL-SCNC: 136 MMOL/L (ref 136–145)
WBC OTHER # BLD: 7.5 K/UL (ref 4–10.5)

## 2025-07-19 PROCEDURE — 36415 COLL VENOUS BLD VENIPUNCTURE: CPT

## 2025-07-19 PROCEDURE — 92610 EVALUATE SWALLOWING FUNCTION: CPT | Performed by: SPEECH-LANGUAGE PATHOLOGIST

## 2025-07-19 PROCEDURE — 85027 COMPLETE CBC AUTOMATED: CPT

## 2025-07-19 PROCEDURE — 99223 1ST HOSP IP/OBS HIGH 75: CPT

## 2025-07-19 PROCEDURE — 84100 ASSAY OF PHOSPHORUS: CPT

## 2025-07-19 PROCEDURE — G0378 HOSPITAL OBSERVATION PER HR: HCPCS

## 2025-07-19 PROCEDURE — 2500000003 HC RX 250 WO HCPCS: Performed by: STUDENT IN AN ORGANIZED HEALTH CARE EDUCATION/TRAINING PROGRAM

## 2025-07-19 PROCEDURE — 6370000000 HC RX 637 (ALT 250 FOR IP): Performed by: STUDENT IN AN ORGANIZED HEALTH CARE EDUCATION/TRAINING PROGRAM

## 2025-07-19 PROCEDURE — 83735 ASSAY OF MAGNESIUM: CPT

## 2025-07-19 PROCEDURE — 80048 BASIC METABOLIC PNL TOTAL CA: CPT

## 2025-07-19 PROCEDURE — 6370000000 HC RX 637 (ALT 250 FOR IP)

## 2025-07-19 PROCEDURE — 94761 N-INVAS EAR/PLS OXIMETRY MLT: CPT

## 2025-07-19 PROCEDURE — 70551 MRI BRAIN STEM W/O DYE: CPT

## 2025-07-19 RX ORDER — IBUPROFEN 400 MG/1
400 TABLET, FILM COATED ORAL ONCE
Status: COMPLETED | OUTPATIENT
Start: 2025-07-19 | End: 2025-07-19

## 2025-07-19 RX ADMIN — ARIPIPRAZOLE 30 MG: 10 TABLET ORAL at 09:00

## 2025-07-19 RX ADMIN — GABAPENTIN 300 MG: 300 CAPSULE ORAL at 14:26

## 2025-07-19 RX ADMIN — GABAPENTIN 300 MG: 300 CAPSULE ORAL at 09:00

## 2025-07-19 RX ADMIN — APIXABAN 5 MG: 5 TABLET, FILM COATED ORAL at 09:00

## 2025-07-19 RX ADMIN — IBUPROFEN 400 MG: 400 TABLET, FILM COATED ORAL at 09:00

## 2025-07-19 RX ADMIN — SODIUM CHLORIDE, PRESERVATIVE FREE 10 ML: 5 INJECTION INTRAVENOUS at 09:01

## 2025-07-19 NOTE — DISCHARGE SUMMARY
V2.0  Discharge Summary    Name:  Jhonatan Cameron /Age/Sex: 1955 (70 y.o. male)   Admit Date: 2025  Discharge Date: 25    MRN & CSN:  [id number] & [id number] Encounter Date and Time 25 3:15 PM EDT    Attending:  Ayad Zeng MD Discharging Provider: EMPERATRIZ Garay [facility] Course:     Brief HPI: Jhonatan Cameron is a 70 y.o. male who presented with slurred speech symptoms started around 1 PM.  Denies any nausea vomiting diarrhea constipation dizziness lightheaded leg pain  Patient reports that 1 month ago he also had a similar symptom in the past denies any history of CVA or TIA diagnosed in the past but does believe that this looks like mini stroke is one of his family member has been stroke like this.  To be admitted to the hospital    Brief Problem Based Course:   # Slurred speech likely 2/2 TIA, resolved  Last known well around 1 PM.  NIH of 1.  Patient is on Eliquis not a candidate for TNK as per stroke team as well.  Aspirin and statin.  Telemetry in place neurology consulted MRI of the brain has been ordered CT head negative.  Patient IV ripped off patient refusing CTA.  MRI with no acute intracranial abnormality.Chronic macrovascular disease. Advance diet as tolerated able to swallow.  SLP negative.  Regular soft diet  # Hyperlipidemia:  atorvastatin  # Mood disorder on Abilify at home  # Peripheral neuropathy on gabapentin at home  # Chronic diastolic heart failure with preserved ejection fraction on Lasix at home which can be held  History of vitamin D deficiency         The patient expressed appropriate understanding of, and agreement with the discharge recommendations, medications, and plan.     Consults this admission:  IP CONSULT TO VASCULAR ACCESS TEAM  IP CONSULT TO NEUROLOGY    Discharge Diagnosis:   TIA (transient ischemic attack)    HLD  Mood disorder  Neuropathy  CHF  Vitamin D deficiency    Discharge Instruction:   Follow up appointments:

## 2025-07-19 NOTE — PROGRESS NOTES
This nurse verified with Emily Cameron via phone that patient room at [facility] is paid for. [employer] fom faxed to security at this time.

## 2025-07-19 NOTE — PROGRESS NOTES
Sister Emily Nisha aware of DC. States she cannot pick patient up today. Request for the hospital to arrange transport to [facility]

## 2025-07-19 NOTE — PLAN OF CARE
Problem: Discharge Planning  Goal: Discharge to home or other facility with appropriate resources  7/19/2025 0923 by Zoie Carrillo, RN  Outcome: Progressing  7/19/2025 0004 by Rayo Dietrich, RN  Outcome: Progressing     
  Problem: Discharge Planning  Goal: Discharge to home or other facility with appropriate resources  7/19/2025 1044 by Zoie Carrillo, RN  Outcome: Progressing  7/19/2025 0923 by Zoie Carrillo, RN  Outcome: Progressing  7/19/2025 0004 by Rayo Dietrich, RN  Outcome: Progressing     
  Problem: Discharge Planning  Goal: Discharge to home or other facility with appropriate resources  Outcome: Progressing     
    Social Determinants of Health     Financial Resource Strain: Low Risk  (8/1/2024)    Overall Financial Resource Strain (CARDIA)     Difficulty of Paying Living Expenses: Not hard at all   Food Insecurity: No Food Insecurity (8/1/2024)    Hunger Vital Sign     Worried About Running Out of Food in the Last Year: Never true     Ran Out of Food in the Last Year: Never true   Transportation Needs: Unknown (8/1/2024)    PRAPARE - Transportation     Lack of Transportation (Medical): Not on file     Lack of Transportation (Non-Medical): No   Physical Activity: Not on file   Stress: Not on file   Social Connections: Moderately Integrated (11/5/2024)    Social Connections ([facility] HRSN)     If for any reason you need help with day-to-day activities such as bathing, preparing meals, shopping, managing finances, etc., do you get the help you need?: Not on file   Intimate Partner Violence: Not on file   Housing Stability: Unknown (8/1/2024)    Housing Stability Vital Sign     Unable to Pay for Housing in the Last Year: Not on file     Number of Places Lived in the Last Year: Not on file     Unstable Housing in the Last Year: No        No family history on file.    PE  Vitals:    11/05/24 1050   BP: 138/84   Site: Left Upper Arm   Position: Sitting   Cuff Size: Large Adult   Weight: 95.7 kg (211 lb)     Estimated body mass index is 35.11 kg/m² as calculated from the following:    Height as of 8/14/24: 1.651 m (5' 5\").    Weight as of this encounter: 95.7 kg (211 lb).    Physical Exam  Constitutional:       Appearance: Normal appearance. She is obese.   Cardiovascular:      Rate and Rhythm: Normal rate and regular rhythm.      Pulses: Normal pulses.      Heart sounds: Normal heart sounds.   Pulmonary:      Effort: Pulmonary effort is normal.      Breath sounds: Normal breath sounds.   Abdominal:      General: Abdomen is flat. Bowel sounds are normal.      Palpations: Abdomen is soft.   Neurological:      General: No focal deficit

## 2025-07-19 NOTE — PROGRESS NOTES
SLP Therapy  Facility/Department: [facility] OBSERVATION   CLINICAL BEDSIDE SWALLOW EVALUATION    NAME: Jhonatan Cameron  : 1955  MRN: [id number]    ADMISSION DATE: 2025  ADMITTING DIAGNOSIS: has SOB (shortness of breath) on exertion; Acute pulmonary embolism (HCC); Gait disturbance; Dizzy; Morbid obesity (HCC); Bipolar disorder (HCC); History of DVT of lower extremity; General weakness; Abnormal EKG; Exertional dyspnea; Sinus bradycardia; Pulmonary embolism without acute cor pulmonale (HCC); Morbid obesity due to excess calories (HCC); CIDP (chronic inflammatory demyelinating polyneuropathy) ([facility]); Bipolar disorder, current episode mixed, moderate (HCC); WD-Cellulitis of left lower leg; WDCellulitis of leg, right; WD-Arterial insufficiency of lower extremity (HCC); WD-Venous insufficiency of both lower extremities; WD-Lymphedema of both lower extremities; Debility; Bipolar I disorder with shy (HCC); Difficulty walking; Bipolar disorder with severe shy (HCC); Dysarthria; Stroke-like symptoms; Venous stasis ulcer of left thigh with fat layer exposed with varicose veins (HCC); Venous stasis ulcer of right thigh with fat layer exposed with varicose veins (HCC); Venous stasis dermatitis of both lower extremities; Anticoagulated on apixaban; Long toenail; Ataxia; and TIA (transient ischemic attack) on their problem list.    Impression  Dysphagia Diagnosis: Swallow function appears [facility]  Dysphagia Outcome Severity Scale: Level 6: Within functional limits/Modified independence     Jhonatan Cameron was seen for a clinical bedside swallow evaluation under Observation in the Emergency Dept.  Pt admitted due to c/o stroke-like symptoms (right facial droop and \"slurred\" speech).  Pt is intermittently homeless vs staying in a hotel provided by his sister.  He was alert, pleasant, severely verbose and tangential likely baseline and related to apparent mental illness (pt with delusional thinking, reported he is the

## 2025-07-19 NOTE — CONSULTS
Neurology Service Consult Note  [facility]   Patient Name: Jhonatan Cameron  : 1955        Subjective:   Reason for consult: Facial droop and dysarthria  70 y.o. - male with history of bipolar, COPD, HLD, HTN, neuropathy, schizoaffective disorder, tremor, cellulitis, DVT, PE is presenting to [facility] for right-sided facial droop and dysarthria.    Patient reports his last known well yesterday at approximately 1245.  Reports he took a nap yesterday afternoon when he woke up he was having slurred speech.  He called EMS who brought the patient in for stroke alert, symptoms did persist.  He denied any blurry vision, confusion, headache, or neck stiffness.  NIH was a 2.  CT, CTA head was negative for any acute abnormality.  Was not a candidate for TNK due to already being on anticoagulation with Eliquis.  Subsequently decided to admit for further evaluation and treatment.    Patient just back from MRI on evaluation. He reports symptoms have resolved completely. Reports he was told he had facial droop but did not observe it. Did feel his speech was dysarthric, feels it is back to baseline today. He does not have any upper dentures which does cause slight speech impediment. Denies any lateralizing weakness or paresthesia. Also reports he has known history of CATARINA is unable to wear CPAP. He has been compliant with Eliquis but is not taking statin medication due to perceived difficulties with memory.     Past Medical History:   Diagnosis Date    Arthritis     Bipolar 1 disorder (HCC)     COPD (chronic obstructive pulmonary disease) ([facility])     \"use to have copd but is better\"    Hx of blood clots     \"had blood clot in lung and in my legs- that was in 2016- on Eliquis\"    Hyperlipidemia     Hypertension     Neuropathy     Schizo affective schizophrenia ([facility])     \"follow with Dr GERHARD Ramirez at [facility]    Tremors of nervous system     WD-Arterial insufficiency of lower extremity ([facility])

## 2025-07-19 NOTE — PROGRESS NOTES
4 Eyes Skin Assessment     NAME:  Jhonatan Cameron  YOB: 1955  MEDICAL RECORD NUMBER:  [id number]    The patient is being assessed for  Admission    I agree that at least one RN has performed a thorough Head to Toe Skin Assessment on the patient. ALL assessment sites listed below have been assessed.      Areas assessed by both nurses:    Head, Face, Ears, Shoulders, Back, Chest, Arms, Elbows, Hands, Sacrum. Buttock, Coccyx, Ischium, and Legs. Feet and Heels        Does the Patient have a Wound? No noted wound(s)       Rich Prevention initiated by RN: Yes  Wound Care Orders initiated by RN: No    For hospital-acquired stage 1 & 2 and ALL Stage 3,4, Unstageable, DTI, NWPT, and Complex wounds: place order “IP Wound Care/Ostomy Nurse Eval and Treat” by RN under : No    New Ostomies, if present place, Ostomy referral order under : No     Nurse 1 eSignature: Electronically signed by Rayo Dietrich RN on 7/19/25 at 6:10 AM EDT    **SHARE this note so that the co-signing nurse can place an eSignature**    Nurse 2 eSignature: {Esignature:[id number]}

## 2025-07-19 NOTE — PROGRESS NOTES
This patient's MRI Q remains incomplete at this time. RN to complete the MRI Q with PT/POA/family before patient is able to have exam performed.

## 2025-07-25 ENCOUNTER — HOSPITAL ENCOUNTER (EMERGENCY)
Age: 70
Discharge: HOME | End: 2025-07-25
Payer: MEDICARE

## 2025-07-25 VITALS
SYSTOLIC BLOOD PRESSURE: 136 MMHG | DIASTOLIC BLOOD PRESSURE: 52 MMHG | WEIGHT: 260 LBS | HEIGHT: 71 IN | TEMPERATURE: 98.4 F | BODY MASS INDEX: 36.4 KG/M2 | HEART RATE: 72 BPM | RESPIRATION RATE: 17 BRPM | OXYGEN SATURATION: 97 %

## 2025-07-25 DIAGNOSIS — Z86.69 HISTORY OF NEUROPATHY: ICD-10-CM

## 2025-07-25 DIAGNOSIS — Z87.898 HISTORY OF VERTIGO: Primary | ICD-10-CM

## 2025-07-25 PROCEDURE — 99283 EMERGENCY DEPT VISIT LOW MDM: CPT

## 2025-07-25 PROCEDURE — 6370000000 HC RX 637 (ALT 250 FOR IP): Performed by: PHYSICIAN ASSISTANT

## 2025-07-25 RX ORDER — MECLIZINE HYDROCHLORIDE 25 MG/1
25 TABLET ORAL ONCE
Status: COMPLETED | OUTPATIENT
Start: 2025-07-25 | End: 2025-07-25

## 2025-07-25 RX ORDER — MECLIZINE HCL 12.5 MG 12.5 MG/1
12.5 TABLET ORAL 3 TIMES DAILY PRN
Qty: 15 TABLET | Refills: 0 | Status: SHIPPED | OUTPATIENT
Start: 2025-07-25 | End: 2025-08-04

## 2025-07-25 RX ADMIN — MECLIZINE HYDROCHLORIDE 25 MG: 25 TABLET ORAL at 21:44

## 2025-07-25 ASSESSMENT — PAIN - FUNCTIONAL ASSESSMENT: PAIN_FUNCTIONAL_ASSESSMENT: NONE - DENIES PAIN

## 2025-07-26 NOTE — ED NOTES
Pt states he was trespassing at a place he was \"invited to for a party\" he got arrested but then released today and has been walking the streets when he got picked up by EMS. Pt states \"I feel unstable due to vertigo\". Pt states he is not having suicidal thoughts but said he is the second coming of Bahman Himself.   
23-Sep-2020

## 2025-07-26 NOTE — ED PROVIDER NOTES
Triage Chief Complaint:   Dizziness    [place]:  Today in the ED I had the pleasure of caring for Jhonatan Cameron who is a 70 y.o. male that presents today to the ED for evaluation.  Patient states that he has had vertigo in the past.  When he is having similar symptoms he has been having the symptoms were out for 5 days.  When he states that \"things are moving\".  Patient does have a history of bipolar disorder, schizoaffective disorder.  No history of TIA CVA ACS.  Denies any changes in vision dizziness confusion no headache.  No musculoskeletal weakness.  Does endorse being a little unstable on his feet secondary to chronic neuropathy.  Denies any paresthesias..    ROS:  REVIEW OF SYSTEMS    At least 10 systems reviewed      All other review of systems are negative  See HPI and nursing notes for additional information       Past Medical History:   Diagnosis Date    Arthritis     Bipolar 1 disorder (HCC)     COPD (chronic obstructive pulmonary disease) (HCC)     \"use to have copd but is better\"    Hx of blood clots     \"had blood clot in lung and in my legs- that was in 2016- on Eliquis\"    Hyperlipidemia     Hypertension     Neuropathy     Schizo affective schizophrenia (HCC)     \"follow with Dr [facility]ERHARD Ramirez at [facility]    Tremors of nervous system     WD-Arterial insufficiency of lower extremity (HCC) 2/4/2021    WD-Cellulitis of left lower leg 2/4/2021    WD-Venous insufficiency of both lower extremities 2/4/2021    WDCellulitis of leg, right 2/4/2021     Past Surgical History:   Procedure Laterality Date    COLONOSCOPY  last one 2012    DENTAL SURGERY      \"wisdom teeth put to sleep- yrs ago\"    FRACTURE SURGERY  age 25    fx left fibia and tibia- hardware later removed    HYDROCELE EXCISION  10+ yrs ago    IR GUIDED IVC FILTER PLACEMENT N/A 07/14/2016    ALN filter lot#[id number], exp - Dr [facility]    JOINT REPLACEMENT  2012    L knee    UPPER GASTROINTESTINAL ENDOSCOPY  04/12/2018     Family History   Problem

## 2025-08-01 ENCOUNTER — APPOINTMENT (OUTPATIENT)
Dept: GENERAL RADIOLOGY | Age: 70
End: 2025-08-01
Payer: MEDICARE

## 2025-08-01 ENCOUNTER — HOSPITAL ENCOUNTER (EMERGENCY)
Age: 70
Discharge: HOME | End: 2025-08-01
Attending: EMERGENCY MEDICINE
Payer: MEDICARE

## 2025-08-01 VITALS
OXYGEN SATURATION: 92 % | HEART RATE: 88 BPM | RESPIRATION RATE: 14 BRPM | TEMPERATURE: 98.5 F | DIASTOLIC BLOOD PRESSURE: 83 MMHG | SYSTOLIC BLOOD PRESSURE: 110 MMHG

## 2025-08-01 DIAGNOSIS — M25.562 ACUTE PAIN OF LEFT KNEE: ICD-10-CM

## 2025-08-01 DIAGNOSIS — M17.11 PRIMARY OSTEOARTHRITIS OF RIGHT KNEE: Primary | ICD-10-CM

## 2025-08-01 DIAGNOSIS — M25.561 ACUTE PAIN OF RIGHT KNEE: ICD-10-CM

## 2025-08-01 PROCEDURE — 99283 EMERGENCY DEPT VISIT LOW MDM: CPT

## 2025-08-01 PROCEDURE — 73562 X-RAY EXAM OF KNEE 3: CPT

## 2025-08-01 PROCEDURE — 6370000000 HC RX 637 (ALT 250 FOR IP): Performed by: EMERGENCY MEDICINE

## 2025-08-01 PROCEDURE — 6360000002 HC RX W HCPCS: Performed by: EMERGENCY MEDICINE

## 2025-08-01 RX ORDER — DEXAMETHASONE 4 MG/1
6 TABLET ORAL EVERY 12 HOURS SCHEDULED
Status: DISCONTINUED | OUTPATIENT
Start: 2025-08-01 | End: 2025-08-01

## 2025-08-01 RX ORDER — MECLIZINE HYDROCHLORIDE 25 MG/1
50 TABLET ORAL ONCE
Status: COMPLETED | OUTPATIENT
Start: 2025-08-01 | End: 2025-08-01

## 2025-08-01 RX ORDER — DEXAMETHASONE 4 MG/1
6 TABLET ORAL ONCE
Status: COMPLETED | OUTPATIENT
Start: 2025-08-01 | End: 2025-08-01

## 2025-08-01 RX ORDER — PREDNISONE 20 MG/1
20 TABLET ORAL 2 TIMES DAILY
Qty: 10 TABLET | Refills: 0 | Status: SHIPPED | OUTPATIENT
Start: 2025-08-01 | End: 2025-08-06

## 2025-08-01 RX ADMIN — MECLIZINE HYDROCHLORIDE 50 MG: 25 TABLET ORAL at 18:49

## 2025-08-01 RX ADMIN — DEXAMETHASONE 6 MG: 4 TABLET ORAL at 18:49

## 2025-08-01 ASSESSMENT — PAIN SCALES - GENERAL
PAINLEVEL_OUTOF10: 6
PAINLEVEL_OUTOF10: 6

## 2025-08-01 ASSESSMENT — PAIN - FUNCTIONAL ASSESSMENT
PAIN_FUNCTIONAL_ASSESSMENT: 0-10
PAIN_FUNCTIONAL_ASSESSMENT: 0-10

## 2025-08-01 ASSESSMENT — PAIN DESCRIPTION - ORIENTATION: ORIENTATION: LEFT;RIGHT

## 2025-08-01 ASSESSMENT — PAIN DESCRIPTION - DESCRIPTORS: DESCRIPTORS: ACHING

## 2025-08-01 ASSESSMENT — PAIN DESCRIPTION - LOCATION: LOCATION: KNEE

## 2025-08-01 NOTE — ED PROVIDER NOTES
Emergency Department Encounter    Patient: Jhonatan Cameron  MRN: [id number]  : 1955  Date of Evaluation: 2025  ED Provider:  Lynda Brown DO    Triage Chief Complaint:   Knee Pain (Bilateral knee and ankle pain. Patient is requesting ibuprofen )    [place]:  Jhonatan Cameron is a 70 y.o. male with history of morbid obesity bipolar disorder chronic inflammatory demyelinating polyneuropathy difficulty ambulating, lymphedema bilateral lower extremities arterial venous insufficiency bilateral lower extremities, history of TIA, schizoaffective disorder hypertension that presents to the emergency department via EMS for knee pain.  Patient states he has bilateral knee pain right greater than left.  Patient states he had a left knee replacement in the past.  Patient states he has not scheduled a right knee replacement.  Pay states he did have a fall a week or so ago on his knee but never got evaluated.  Patient states knee pain has gotten worse.  Patient states he is out of the ibuprofen that he usually takes for his knee pain.  Patient states difficulty ambulating does not want to use any assistive devices.  Patient states since he was not able to get around due to the knee pain he called 911.  Patient states he does have vertigo he is on meclizine he has not taken any today.  Patient requesting meclizine today.  Patient states he brought all his home medication with him as well today.  Patient states he currently lives at the [facility] for homeless people.  Patient here for evaluation.    ROS - see HPI, below listed is current ROS at time of my eval:  10 systems reviewed and negative except as above.     Past Medical History:   Diagnosis Date    Arthritis     Bipolar 1 disorder (HCC)     COPD (chronic obstructive pulmonary disease) (HCC)     \"use to have copd but is better\"    Hx of blood clots     \"had blood clot in lung and in my legs- that was in 2016- on Eliquis\"    Hyperlipidemia     Hypertension

## 2025-08-01 NOTE — DISCHARGE INSTRUCTIONS
Follow-up with your primary care physician Dr. Ziegler in 3 days for reevaluation.  Call for an appointment  Follow-up with orthopedist Dr. Murphy or Dr. Hamilton for evaluation of  right knee arthritis and pain and left knee pain.  Call for an appointment  Take prednisone as prescribed for inflammation pain and swelling  Return to the emergency department immediately any pain fever chills nausea vomiting worsening symptoms      1st Step     2nd Step        3rd Step  Grab your Photo ID.    To speed up the process, get a police check downtown.  Call [facility] Intake Office Hours- Call to be placed on waitlist and to complete intake process   We need to be sure who you are.    You cannot get help from any of the shelter programs in [place] until you have completed the intake process through [employer].  It is encouraged to call several times a day to check your status on the waitlist!     M-F, 9:00 am to 3:30 pm      Phone [phone]    Email: help@[employer]       Website: www.[email or web address]      [facility]  Shelter for Single Women   Shelter for Single Men  Shelter for Chronically Homeless  [address] [place] [address]    [address] [facility]    [address] [id number]   Phone: [phone]    Phone: [phone]   Phone: [phone]            [address] [place] [address]  www.[employer](IN[employer]).org/185/Prevention-Retention-Contingency-PRC    Prevention, Retention & Contingency (PRC)/ Homeless Assistance  Helping Families Overcome Barriers to Self-Sufficiency  The Prevention, Retention and Contingency (PRC) Program helps employed low-income families overcome immediate barriers to achieving self-sufficiency. This Program helps families meet needs required to accept a job offer or retain a job as well as emergency needs that, if not resolved, could threaten the health, safety or

## 2025-08-01 NOTE — CARE COORDINATION
CM consulted for homelessness. Pt has an active care management plan. Pt is known to be homeless and banned from all area shelters. CM did place area resource list in AVS. Pt has an active [facility] CM named Jluis [phone] who has been unsuccessful in placing him in an apartment setting noted due to behaviors. Previous admission note on 7/19 state pt was living at the [facility].      aware of the information.

## 2025-08-05 ENCOUNTER — HOSPITAL ENCOUNTER (EMERGENCY)
Age: 70
Discharge: PSYCHIATRIC OR SUBSTANCE USE FACILITY | End: 2025-08-06
Attending: STUDENT IN AN ORGANIZED HEALTH CARE EDUCATION/TRAINING PROGRAM
Payer: MEDICARE

## 2025-08-05 DIAGNOSIS — F23 ACUTE PSYCHOSIS (HCC): Primary | ICD-10-CM

## 2025-08-05 LAB
ALBUMIN SERPL-MCNC: 3.9 G/DL (ref 3.4–5)
ALBUMIN/GLOB SERPL: 1.3 {RATIO} (ref 1.1–2.2)
ALP SERPL-CCNC: 108 U/L (ref 40–129)
ALT SERPL-CCNC: 16 U/L (ref 10–40)
AMPHET UR QL SCN: NEGATIVE
ANION GAP SERPL CALCULATED.3IONS-SCNC: 13 MMOL/L (ref 9–17)
APAP SERPL-MCNC: 8 UG/ML (ref 10–30)
AST SERPL-CCNC: 20 U/L (ref 15–37)
BARBITURATES UR QL SCN: NEGATIVE
BASOPHILS # BLD: 0.06 K/UL
BASOPHILS NFR BLD: 1 % (ref 0–1)
BENZODIAZ UR QL: NEGATIVE
BILIRUB SERPL-MCNC: 0.5 MG/DL (ref 0–1)
BILIRUB UR QL STRIP: NEGATIVE
BUN SERPL-MCNC: 26 MG/DL (ref 7–20)
CALCIUM SERPL-MCNC: 8.8 MG/DL (ref 8.3–10.6)
CANNABINOIDS UR QL SCN: NEGATIVE
CASTS #/AREA URNS LPF: ABNORMAL /LPF
CHLORIDE SERPL-SCNC: 97 MMOL/L (ref 99–110)
CLARITY UR: CLEAR
CO2 SERPL-SCNC: 25 MMOL/L (ref 21–32)
COCAINE UR QL SCN: NEGATIVE
COLOR UR: YELLOW
CREAT SERPL-MCNC: 1.2 MG/DL (ref 0.8–1.3)
EOSINOPHIL # BLD: 0.41 K/UL
EOSINOPHILS RELATIVE PERCENT: 4 % (ref 0–3)
ERYTHROCYTE [DISTWIDTH] IN BLOOD BY AUTOMATED COUNT: 14.2 % (ref 11.7–14.9)
ETHANOLAMINE SERPL-MCNC: <10 MG/DL (ref 0–0.08)
FENTANYL UR QL: NEGATIVE
GFR, ESTIMATED: 62 ML/MIN/1.73M2
GLUCOSE SERPL-MCNC: 84 MG/DL (ref 74–99)
GLUCOSE UR STRIP-MCNC: NEGATIVE MG/DL
HCT VFR BLD AUTO: 40.9 % (ref 42–52)
HGB BLD-MCNC: 13.4 G/DL (ref 13.5–18)
HGB UR QL STRIP.AUTO: ABNORMAL
IMM GRANULOCYTES # BLD AUTO: 0.05 K/UL
IMM GRANULOCYTES NFR BLD: 1 %
KETONES UR STRIP-MCNC: NEGATIVE MG/DL
LEUKOCYTE ESTERASE UR QL STRIP: NEGATIVE
LYMPHOCYTES NFR BLD: 1.82 K/UL
LYMPHOCYTES RELATIVE PERCENT: 18 % (ref 24–44)
MCH RBC QN AUTO: 30.1 PG (ref 27–31)
MCHC RBC AUTO-ENTMCNC: 32.8 G/DL (ref 32–36)
MCV RBC AUTO: 91.9 FL (ref 78–100)
MONOCYTES NFR BLD: 0.64 K/UL
MONOCYTES NFR BLD: 6 % (ref 0–5)
MUCOUS THREADS URNS QL MICRO: ABNORMAL
NEUTROPHILS NFR BLD: 71 % (ref 36–66)
NEUTS SEG NFR BLD: 7.23 K/UL
NITRITE UR QL STRIP: NEGATIVE
OPIATES UR QL SCN: NEGATIVE
OXYCODONE UR QL SCN: NEGATIVE
PH UR STRIP: 6 [PH] (ref 5–8)
PLATELET # BLD AUTO: 204 K/UL (ref 140–440)
PMV BLD AUTO: 9.6 FL (ref 7.5–11.1)
POTASSIUM SERPL-SCNC: 4 MMOL/L (ref 3.5–5.1)
PROT SERPL-MCNC: 6.8 G/DL (ref 6.4–8.2)
PROT UR STRIP-MCNC: NEGATIVE MG/DL
RBC # BLD AUTO: 4.45 M/UL (ref 4.6–6.2)
RBC #/AREA URNS HPF: <1 /HPF (ref 0–2)
SALICYLATES SERPL-MCNC: <0.5 MG/DL (ref 15–30)
SODIUM SERPL-SCNC: 136 MMOL/L (ref 136–145)
SP GR UR STRIP: 1.01 (ref 1–1.03)
TEST INFORMATION: NORMAL
TSH SERPL DL<=0.05 MIU/L-ACNC: 0.61 UIU/ML (ref 0.27–4.2)
UROBILINOGEN UR STRIP-ACNC: 0.2 EU/DL (ref 0–1)
WBC #/AREA URNS HPF: 0 /HPF (ref 0–5)
WBC OTHER # BLD: 10.2 K/UL (ref 4–10.5)

## 2025-08-05 PROCEDURE — 93005 ELECTROCARDIOGRAM TRACING: CPT | Performed by: EMERGENCY MEDICINE

## 2025-08-05 PROCEDURE — 81001 URINALYSIS AUTO W/SCOPE: CPT

## 2025-08-05 PROCEDURE — G0480 DRUG TEST DEF 1-7 CLASSES: HCPCS

## 2025-08-05 PROCEDURE — 80307 DRUG TEST PRSMV CHEM ANLYZR: CPT

## 2025-08-05 PROCEDURE — 84443 ASSAY THYROID STIM HORMONE: CPT

## 2025-08-05 PROCEDURE — 85025 COMPLETE CBC W/AUTO DIFF WBC: CPT

## 2025-08-05 PROCEDURE — 80143 DRUG ASSAY ACETAMINOPHEN: CPT

## 2025-08-05 PROCEDURE — 90791 PSYCH DIAGNOSTIC EVALUATION: CPT | Performed by: SOCIAL WORKER

## 2025-08-05 PROCEDURE — 99285 EMERGENCY DEPT VISIT HI MDM: CPT

## 2025-08-05 PROCEDURE — 80179 DRUG ASSAY SALICYLATE: CPT

## 2025-08-05 PROCEDURE — 80053 COMPREHEN METABOLIC PANEL: CPT

## 2025-08-05 ASSESSMENT — PAIN SCALES - GENERAL: PAINLEVEL_OUTOF10: 2

## 2025-08-05 ASSESSMENT — PAIN DESCRIPTION - LOCATION: LOCATION: KNEE

## 2025-08-05 ASSESSMENT — PAIN - FUNCTIONAL ASSESSMENT: PAIN_FUNCTIONAL_ASSESSMENT: 0-10

## 2025-08-06 VITALS
TEMPERATURE: 98 F | DIASTOLIC BLOOD PRESSURE: 84 MMHG | RESPIRATION RATE: 20 BRPM | OXYGEN SATURATION: 97 % | HEART RATE: 55 BPM | SYSTOLIC BLOOD PRESSURE: 134 MMHG

## 2025-08-06 LAB
EKG ATRIAL RATE: 55 BPM
EKG DIAGNOSIS: NORMAL
EKG P AXIS: 10 DEGREES
EKG P-R INTERVAL: 176 MS
EKG Q-T INTERVAL: 428 MS
EKG QRS DURATION: 120 MS
EKG QTC CALCULATION (BAZETT): 409 MS
EKG R AXIS: -45 DEGREES
EKG T AXIS: 35 DEGREES
EKG VENTRICULAR RATE: 55 BPM

## 2025-08-06 PROCEDURE — 93010 ELECTROCARDIOGRAM REPORT: CPT | Performed by: INTERNAL MEDICINE

## 2025-08-06 ASSESSMENT — PAIN SCALES - GENERAL: PAINLEVEL_OUTOF10: 2

## 2025-08-06 ASSESSMENT — PAIN DESCRIPTION - LOCATION: LOCATION: KNEE
